# Patient Record
Sex: MALE | Race: WHITE | Employment: FULL TIME | URBAN - METROPOLITAN AREA
[De-identification: names, ages, dates, MRNs, and addresses within clinical notes are randomized per-mention and may not be internally consistent; named-entity substitution may affect disease eponyms.]

---

## 2022-11-16 ENCOUNTER — APPOINTMENT (OUTPATIENT)
Dept: GENERAL RADIOLOGY | Age: 52
DRG: 871 | End: 2022-11-16
Payer: MEDICARE

## 2022-11-16 ENCOUNTER — APPOINTMENT (OUTPATIENT)
Dept: ULTRASOUND IMAGING | Age: 52
DRG: 871 | End: 2022-11-16
Payer: MEDICARE

## 2022-11-16 ENCOUNTER — HOSPITAL ENCOUNTER (INPATIENT)
Age: 52
LOS: 7 days | Discharge: HOME HEALTH | DRG: 871 | End: 2022-11-23
Attending: EMERGENCY MEDICINE | Admitting: INTERNAL MEDICINE
Payer: MEDICARE

## 2022-11-16 DIAGNOSIS — Z71.89 GOALS OF CARE, COUNSELING/DISCUSSION: ICD-10-CM

## 2022-11-16 DIAGNOSIS — K92.2 LOWER GI BLEED: ICD-10-CM

## 2022-11-16 DIAGNOSIS — K94.01 BLEEDING FROM COLOSTOMY STOMA (HCC): Primary | ICD-10-CM

## 2022-11-16 DIAGNOSIS — E86.1 HYPOTENSION DUE TO HYPOVOLEMIA: ICD-10-CM

## 2022-11-16 DIAGNOSIS — D64.9 ANEMIA, UNSPECIFIED TYPE: ICD-10-CM

## 2022-11-16 DIAGNOSIS — R31.9 HEMATURIA, UNSPECIFIED TYPE: ICD-10-CM

## 2022-11-16 DIAGNOSIS — R00.0 TACHYCARDIA: ICD-10-CM

## 2022-11-16 DIAGNOSIS — I95.89 HYPOTENSION DUE TO HYPOVOLEMIA: ICD-10-CM

## 2022-11-16 PROBLEM — K62.5 RECTAL BLEEDING: Status: ACTIVE | Noted: 2022-11-16

## 2022-11-16 LAB
ABO/RH: NORMAL
ANION GAP SERPL CALCULATED.3IONS-SCNC: 19 MMOL/L (ref 3–16)
ANTIBODY SCREEN: NORMAL
BASOPHILS ABSOLUTE: 0 K/UL (ref 0–0.2)
BASOPHILS RELATIVE PERCENT: 0.2 %
BUN BLDV-MCNC: 32 MG/DL (ref 7–20)
CALCIUM SERPL-MCNC: 8.4 MG/DL (ref 8.3–10.6)
CHLORIDE BLD-SCNC: 86 MMOL/L (ref 99–110)
CO2: 19 MMOL/L (ref 21–32)
CREAT SERPL-MCNC: 1.9 MG/DL (ref 0.9–1.3)
EOSINOPHILS ABSOLUTE: 0 K/UL (ref 0–0.6)
EOSINOPHILS RELATIVE PERCENT: 0 %
GFR SERPL CREATININE-BSD FRML MDRD: 41 ML/MIN/{1.73_M2}
GLUCOSE BLD-MCNC: 206 MG/DL (ref 70–99)
GLUCOSE BLD-MCNC: 231 MG/DL (ref 70–99)
GLUCOSE BLD-MCNC: 347 MG/DL (ref 70–99)
HCT VFR BLD CALC: 32.5 % (ref 40.5–52.5)
HEMOGLOBIN: 10.7 G/DL (ref 13.5–17.5)
INR BLD: 1.18 (ref 0.87–1.14)
IRON SATURATION: 22 % (ref 20–50)
IRON: 46 UG/DL (ref 59–158)
LYMPHOCYTES ABSOLUTE: 0.9 K/UL (ref 1–5.1)
LYMPHOCYTES RELATIVE PERCENT: 6.1 %
MAGNESIUM: 1.7 MG/DL (ref 1.8–2.4)
MCH RBC QN AUTO: 29.7 PG (ref 26–34)
MCHC RBC AUTO-ENTMCNC: 33 G/DL (ref 31–36)
MCV RBC AUTO: 90.1 FL (ref 80–100)
MONOCYTES ABSOLUTE: 1.5 K/UL (ref 0–1.3)
MONOCYTES RELATIVE PERCENT: 10 %
NEUTROPHILS ABSOLUTE: 12.4 K/UL (ref 1.7–7.7)
NEUTROPHILS RELATIVE PERCENT: 83.7 %
OCCULT BLOOD SCREENING: ABNORMAL
PDW BLD-RTO: 15.2 % (ref 12.4–15.4)
PERFORMED ON: ABNORMAL
PERFORMED ON: ABNORMAL
PLATELET # BLD: 136 K/UL (ref 135–450)
PMV BLD AUTO: 10.5 FL (ref 5–10.5)
POTASSIUM REFLEX MAGNESIUM: 4.5 MMOL/L (ref 3.5–5.1)
PROTHROMBIN TIME: 14.9 SEC (ref 11.7–14.5)
RBC # BLD: 3.61 M/UL (ref 4.2–5.9)
SODIUM BLD-SCNC: 124 MMOL/L (ref 136–145)
TOTAL IRON BINDING CAPACITY: 213 UG/DL (ref 260–445)
WBC # BLD: 14.8 K/UL (ref 4–11)

## 2022-11-16 PROCEDURE — 94640 AIRWAY INHALATION TREATMENT: CPT

## 2022-11-16 PROCEDURE — 02HV33Z INSERTION OF INFUSION DEVICE INTO SUPERIOR VENA CAVA, PERCUTANEOUS APPROACH: ICD-10-PCS | Performed by: EMERGENCY MEDICINE

## 2022-11-16 PROCEDURE — 82270 OCCULT BLOOD FECES: CPT

## 2022-11-16 PROCEDURE — 2000000000 HC ICU R&B

## 2022-11-16 PROCEDURE — 82728 ASSAY OF FERRITIN: CPT

## 2022-11-16 PROCEDURE — 94761 N-INVAS EAR/PLS OXIMETRY MLT: CPT

## 2022-11-16 PROCEDURE — 83735 ASSAY OF MAGNESIUM: CPT

## 2022-11-16 PROCEDURE — 6370000000 HC RX 637 (ALT 250 FOR IP): Performed by: INTERNAL MEDICINE

## 2022-11-16 PROCEDURE — 86900 BLOOD TYPING SEROLOGIC ABO: CPT

## 2022-11-16 PROCEDURE — 76700 US EXAM ABDOM COMPLETE: CPT

## 2022-11-16 PROCEDURE — 83540 ASSAY OF IRON: CPT

## 2022-11-16 PROCEDURE — 71045 X-RAY EXAM CHEST 1 VIEW: CPT

## 2022-11-16 PROCEDURE — APPSS15 APP SPLIT SHARED TIME 0-15 MINUTES: Performed by: NURSE PRACTITIONER

## 2022-11-16 PROCEDURE — 2580000003 HC RX 258: Performed by: INTERNAL MEDICINE

## 2022-11-16 PROCEDURE — 85018 HEMOGLOBIN: CPT

## 2022-11-16 PROCEDURE — 93005 ELECTROCARDIOGRAM TRACING: CPT | Performed by: EMERGENCY MEDICINE

## 2022-11-16 PROCEDURE — C1769 GUIDE WIRE: HCPCS

## 2022-11-16 PROCEDURE — 86901 BLOOD TYPING SEROLOGIC RH(D): CPT

## 2022-11-16 PROCEDURE — 6360000002 HC RX W HCPCS: Performed by: UROLOGY

## 2022-11-16 PROCEDURE — 6360000002 HC RX W HCPCS

## 2022-11-16 PROCEDURE — 99285 EMERGENCY DEPT VISIT HI MDM: CPT

## 2022-11-16 PROCEDURE — 36430 TRANSFUSION BLD/BLD COMPNT: CPT

## 2022-11-16 PROCEDURE — 36556 INSERT NON-TUNNEL CV CATH: CPT

## 2022-11-16 PROCEDURE — 80048 BASIC METABOLIC PNL TOTAL CA: CPT

## 2022-11-16 PROCEDURE — 85610 PROTHROMBIN TIME: CPT

## 2022-11-16 PROCEDURE — 2580000003 HC RX 258: Performed by: UROLOGY

## 2022-11-16 PROCEDURE — 2580000003 HC RX 258

## 2022-11-16 PROCEDURE — 6370000000 HC RX 637 (ALT 250 FOR IP): Performed by: EMERGENCY MEDICINE

## 2022-11-16 PROCEDURE — 85025 COMPLETE CBC W/AUTO DIFF WBC: CPT

## 2022-11-16 PROCEDURE — 0T7D8ZZ DILATION OF URETHRA, VIA NATURAL OR ARTIFICIAL OPENING ENDOSCOPIC: ICD-10-PCS | Performed by: UROLOGY

## 2022-11-16 PROCEDURE — 86850 RBC ANTIBODY SCREEN: CPT

## 2022-11-16 PROCEDURE — P9016 RBC LEUKOCYTES REDUCED: HCPCS

## 2022-11-16 PROCEDURE — 83550 IRON BINDING TEST: CPT

## 2022-11-16 PROCEDURE — 2709999900 HC NON-CHARGEABLE SUPPLY

## 2022-11-16 PROCEDURE — 86923 COMPATIBILITY TEST ELECTRIC: CPT

## 2022-11-16 PROCEDURE — APPNB15 APP NON BILLABLE TIME 0-15 MINS: Performed by: NURSE PRACTITIONER

## 2022-11-16 PROCEDURE — 85014 HEMATOCRIT: CPT

## 2022-11-16 PROCEDURE — 2700000000 HC OXYGEN THERAPY PER DAY

## 2022-11-16 RX ORDER — SODIUM CHLORIDE 9 MG/ML
INJECTION, SOLUTION INTRAVENOUS PRN
Status: DISCONTINUED | OUTPATIENT
Start: 2022-11-16 | End: 2022-11-23 | Stop reason: HOSPADM

## 2022-11-16 RX ORDER — SODIUM CHLORIDE 0.9 % (FLUSH) 0.9 %
10 SYRINGE (ML) INJECTION PRN
Status: DISCONTINUED | OUTPATIENT
Start: 2022-11-16 | End: 2022-11-23 | Stop reason: HOSPADM

## 2022-11-16 RX ORDER — TADALAFIL 5 MG/1
5 TABLET ORAL PRN
COMMUNITY

## 2022-11-16 RX ORDER — ACETAMINOPHEN 650 MG/1
650 SUPPOSITORY RECTAL EVERY 6 HOURS PRN
Status: DISCONTINUED | OUTPATIENT
Start: 2022-11-16 | End: 2022-11-23 | Stop reason: HOSPADM

## 2022-11-16 RX ORDER — ACETAMINOPHEN 325 MG/1
650 TABLET ORAL EVERY 6 HOURS PRN
Status: DISCONTINUED | OUTPATIENT
Start: 2022-11-16 | End: 2022-11-23 | Stop reason: HOSPADM

## 2022-11-16 RX ORDER — ATORVASTATIN CALCIUM 40 MG/1
40 TABLET, FILM COATED ORAL NIGHTLY
Status: ON HOLD | COMMUNITY
End: 2022-11-22 | Stop reason: HOSPADM

## 2022-11-16 RX ORDER — CELECOXIB 100 MG/1
100 CAPSULE ORAL DAILY
Status: ON HOLD | COMMUNITY
End: 2022-11-22 | Stop reason: HOSPADM

## 2022-11-16 RX ORDER — CLONAZEPAM 1 MG/1
2 TABLET ORAL 2 TIMES DAILY
Status: DISCONTINUED | OUTPATIENT
Start: 2022-11-16 | End: 2022-11-23 | Stop reason: HOSPADM

## 2022-11-16 RX ORDER — PREDNISONE 10 MG/1
TABLET ORAL
Status: ON HOLD | COMMUNITY
Start: 2022-11-14 | End: 2022-11-22 | Stop reason: HOSPADM

## 2022-11-16 RX ORDER — CLONAZEPAM 2 MG/1
2 TABLET ORAL 2 TIMES DAILY
COMMUNITY

## 2022-11-16 RX ORDER — EMTRICITABINE AND TENOFOVIR ALAFENAMIDE 200; 25 MG/1; MG/1
1 TABLET ORAL DAILY
COMMUNITY

## 2022-11-16 RX ORDER — LISINOPRIL 2.5 MG/1
2.5 TABLET ORAL DAILY
Status: ON HOLD | COMMUNITY
End: 2022-11-22 | Stop reason: HOSPADM

## 2022-11-16 RX ORDER — PROMETHAZINE HYDROCHLORIDE 25 MG/1
12.5 TABLET ORAL EVERY 6 HOURS PRN
Status: DISCONTINUED | OUTPATIENT
Start: 2022-11-16 | End: 2022-11-23 | Stop reason: HOSPADM

## 2022-11-16 RX ORDER — ALBUTEROL SULFATE 2.5 MG/3ML
2.5 SOLUTION RESPIRATORY (INHALATION) EVERY 4 HOURS PRN
Status: DISCONTINUED | OUTPATIENT
Start: 2022-11-16 | End: 2022-11-23 | Stop reason: HOSPADM

## 2022-11-16 RX ORDER — SODIUM CHLORIDE 9 MG/ML
INJECTION, SOLUTION INTRAVENOUS CONTINUOUS
Status: DISCONTINUED | OUTPATIENT
Start: 2022-11-16 | End: 2022-11-18

## 2022-11-16 RX ORDER — INSULIN GLARGINE 100 [IU]/ML
18 INJECTION, SOLUTION SUBCUTANEOUS NIGHTLY
Status: ON HOLD | COMMUNITY
End: 2022-11-22 | Stop reason: HOSPADM

## 2022-11-16 RX ORDER — PANTOPRAZOLE SODIUM 40 MG/10ML
40 INJECTION, POWDER, LYOPHILIZED, FOR SOLUTION INTRAVENOUS DAILY
Status: DISCONTINUED | OUTPATIENT
Start: 2022-11-16 | End: 2022-11-23 | Stop reason: HOSPADM

## 2022-11-16 RX ORDER — 0.9 % SODIUM CHLORIDE 0.9 %
500 INTRAVENOUS SOLUTION INTRAVENOUS ONCE
Status: COMPLETED | OUTPATIENT
Start: 2022-11-16 | End: 2022-11-16

## 2022-11-16 RX ORDER — SODIUM CHLORIDE 0.9 % (FLUSH) 0.9 %
10 SYRINGE (ML) INJECTION EVERY 12 HOURS SCHEDULED
Status: DISCONTINUED | OUTPATIENT
Start: 2022-11-16 | End: 2022-11-23 | Stop reason: HOSPADM

## 2022-11-16 RX ORDER — POTASSIUM CHLORIDE 750 MG/1
10 TABLET, EXTENDED RELEASE ORAL DAILY
Status: ON HOLD | COMMUNITY
End: 2022-11-22 | Stop reason: HOSPADM

## 2022-11-16 RX ORDER — DEXTROSE MONOHYDRATE 100 MG/ML
INJECTION, SOLUTION INTRAVENOUS CONTINUOUS PRN
Status: DISCONTINUED | OUTPATIENT
Start: 2022-11-16 | End: 2022-11-23 | Stop reason: HOSPADM

## 2022-11-16 RX ORDER — METFORMIN HYDROCHLORIDE 500 MG/1
1000 TABLET, EXTENDED RELEASE ORAL 2 TIMES DAILY
COMMUNITY

## 2022-11-16 RX ORDER — QUETIAPINE 400 MG/1
800 TABLET, FILM COATED, EXTENDED RELEASE ORAL NIGHTLY
COMMUNITY

## 2022-11-16 RX ORDER — IPRATROPIUM BROMIDE AND ALBUTEROL SULFATE 2.5; .5 MG/3ML; MG/3ML
2 SOLUTION RESPIRATORY (INHALATION) ONCE
Status: COMPLETED | OUTPATIENT
Start: 2022-11-16 | End: 2022-11-16

## 2022-11-16 RX ORDER — INSULIN LISPRO 100 [IU]/ML
0-4 INJECTION, SOLUTION INTRAVENOUS; SUBCUTANEOUS NIGHTLY
Status: DISCONTINUED | OUTPATIENT
Start: 2022-11-16 | End: 2022-11-23 | Stop reason: HOSPADM

## 2022-11-16 RX ORDER — INSULIN LISPRO 100 [IU]/ML
0-8 INJECTION, SOLUTION INTRAVENOUS; SUBCUTANEOUS
Status: DISCONTINUED | OUTPATIENT
Start: 2022-11-16 | End: 2022-11-23 | Stop reason: HOSPADM

## 2022-11-16 RX ORDER — AMOXICILLIN AND CLAVULANATE POTASSIUM 875; 125 MG/1; MG/1
1 TABLET, FILM COATED ORAL 2 TIMES DAILY
Status: ON HOLD | COMMUNITY
Start: 2022-11-14 | End: 2022-11-22 | Stop reason: HOSPADM

## 2022-11-16 RX ORDER — POLYETHYLENE GLYCOL 3350 17 G/17G
17 POWDER, FOR SOLUTION ORAL DAILY PRN
Status: DISCONTINUED | OUTPATIENT
Start: 2022-11-16 | End: 2022-11-23 | Stop reason: HOSPADM

## 2022-11-16 RX ORDER — HYDROCHLOROTHIAZIDE 25 MG/1
25 TABLET ORAL DAILY
Status: ON HOLD | COMMUNITY
End: 2022-11-22 | Stop reason: HOSPADM

## 2022-11-16 RX ORDER — ONDANSETRON 2 MG/ML
4 INJECTION INTRAMUSCULAR; INTRAVENOUS EVERY 6 HOURS PRN
Status: DISCONTINUED | OUTPATIENT
Start: 2022-11-16 | End: 2022-11-23 | Stop reason: HOSPADM

## 2022-11-16 RX ORDER — ONDANSETRON 4 MG/1
4 TABLET, ORALLY DISINTEGRATING ORAL EVERY 8 HOURS PRN
COMMUNITY
Start: 2022-11-14

## 2022-11-16 RX ORDER — ALBUTEROL SULFATE 90 UG/1
2 AEROSOL, METERED RESPIRATORY (INHALATION) EVERY 6 HOURS PRN
COMMUNITY

## 2022-11-16 RX ORDER — ALBUTEROL SULFATE 90 UG/1
2 AEROSOL, METERED RESPIRATORY (INHALATION) EVERY 6 HOURS PRN
Status: DISCONTINUED | OUTPATIENT
Start: 2022-11-16 | End: 2022-11-23 | Stop reason: HOSPADM

## 2022-11-16 RX ADMIN — SODIUM CHLORIDE, PRESERVATIVE FREE 10 ML: 5 INJECTION INTRAVENOUS at 21:00

## 2022-11-16 RX ADMIN — SODIUM CHLORIDE 500 ML: 9 INJECTION, SOLUTION INTRAVENOUS at 18:00

## 2022-11-16 RX ADMIN — CLONAZEPAM 2 MG: 1 TABLET ORAL at 20:54

## 2022-11-16 RX ADMIN — CEFTRIAXONE 2000 MG: 2 INJECTION, POWDER, FOR SOLUTION INTRAMUSCULAR; INTRAVENOUS at 20:58

## 2022-11-16 RX ADMIN — CEFAZOLIN 2000 MG: 2 INJECTION, POWDER, FOR SOLUTION INTRAMUSCULAR; INTRAVENOUS at 19:01

## 2022-11-16 RX ADMIN — IPRATROPIUM BROMIDE AND ALBUTEROL 1 PUFF: 20; 100 SPRAY, METERED RESPIRATORY (INHALATION) at 20:31

## 2022-11-16 RX ADMIN — QUETIAPINE FUMARATE 800 MG: 150 TABLET, EXTENDED RELEASE ORAL at 21:33

## 2022-11-16 RX ADMIN — IPRATROPIUM BROMIDE AND ALBUTEROL SULFATE 2 AMPULE: .5; 2.5 SOLUTION RESPIRATORY (INHALATION) at 14:49

## 2022-11-16 ASSESSMENT — PAIN SCALES - GENERAL: PAINLEVEL_OUTOF10: 0

## 2022-11-16 NOTE — ED PROVIDER NOTES
[facility]      Pt Name: Alejandra Cullen  MRN: [id number]  [place] 1970  Date of evaluation: 11/16/2022  Provider: Gwendolyn Trotter MD    CHIEF COMPLAINT     Bleeding  HISTORY OF PRESENT ILLNESS  (Location/Symptom, Timing/Onset,Context/Setting, Quality, Duration, Modifying Factors, Severity). Note limiting factors. Chief Complaint   Patient presents with    Rectal Bleeding     Pt has been bleeding from colostomy for 2 days, pt is pale and diaphoretic, tachy and hypotensive       Alejandra Cullen is a 47 y.o. male who presents to the emergency department secondary to concern for bleeding from his colostomy bag. He reports has been going on for 2 days. He states it is not stool but rather just bleeding. He states this is happened to him before and it was bleeding at the site that caused to the issue not bleeding from the inside. He reports he has been feeling more short of breath, feeling his heart beat faster, more tired. He states his surgeon was Dr. Evette Spears at [facility].  He reports other than the bleeding he has not had any chest pain, abdominal pain, fevers, chills, cough, nausea, vomiting. He does feel more short of breath when he is laying down flat. He does not live alone and he tells me he did call about the bleeding but was told to see if it got better and come into the ED if it got worse. 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 However he states that because of this he has \"shot veins\". Past medical history noted below. Aside from what is stated above denies any other symptoms or modifying factors. Nursing Notes reviewed. REVIEW OF SYSTEMS  (2-9 systems for level 4, 10 or more for level 5)   Review of Systems Pertinent positive and negative findings as documented in the HPI; otherwise all other systems were reviewed and were negative.    PAST MEDICAL HISTORY     Past Medical History: Diagnosis Date    HIV (human immunodeficiency virus infection) ([facility] [address]        SURGICALHISTORY     No past surgical history on file. CURRENT MEDICATIONS       Current Discharge Medication List        CONTINUE these medications which have NOT CHANGED    Details   albuterol sulfate HFA (PROVENTIL;VENTOLIN;PROAIR) 108 (90 Base) MCG/ACT inhaler Inhale 2 puffs into the lungs every 6 hours as needed for Wheezing or Shortness of Breath      atorvastatin (LIPITOR) 40 MG tablet Take 40 mg by mouth at bedtime      celecoxib (CELEBREX) 100 MG capsule Take 100 mg by mouth daily      dapagliflozin (FARXIGA) 10 MG tablet Take 10 mg by mouth every morning      emtricitabine-tenofovir alafenamide (DESCOVY) 200-25 MG TABS tablet Take 1 tablet by mouth daily      insulin glargine (BASAGLAR KWIKPEN) 100 UNIT/ML injection pen Inject 18 Units into the skin nightly      albuterol-ipratropium (COMBIVENT RESPIMAT)  MCG/ACT AERS inhaler Inhale 1 puff into the lungs in the morning and 1 puff at noon and 1 puff in the evening and 1 puff before bedtime. lisinopril (PRINIVIL;ZESTRIL) 2.5 MG tablet Take 2.5 mg by mouth daily      potassium chloride (KLOR-CON M) 10 MEQ extended release tablet Take 10 mEq by mouth daily      QUEtiapine (SEROQUEL XR) 400 MG extended release tablet Take 800 mg by mouth nightly      raltegravir (ISENTRESS) 400 MG tablet Take 400 mg by mouth 2 times daily      clonazePAM (KLONOPIN) 2 MG tablet Take 2 mg by mouth in the morning and at bedtime.       hydroCHLOROthiazide (HYDRODIURIL) 25 MG tablet Take 25 mg by mouth daily      metFORMIN (GLUCOPHAGE-XR) 500 MG extended release tablet Take 1,000 mg by mouth 2 times daily      tadalafil (CIALIS) 5 MG tablet Take 5 mg by mouth as needed for Erectile Dysfunction      amoxicillin-clavulanate (AUGMENTIN) 875-125 MG per tablet Take 1 tablet by mouth 2 times daily Indications: Upper Respiratory Tract Infection      ondansetron (ZOFRAN-ODT) 4 MG disintegrating tablet Take 4 mg by mouth every 8 hours as needed for Nausea or Vomiting      predniSONE (DELTASONE) 10 MG tablet Take by mouth Take 40mg by mouth daily for 3 days, then take 30mg by mouth daily for 3 days, then take 20mg by mouth daily for 3 days, then take 10mg by mouth daily for 3 days            ALLERGIES     Patient has no known allergies. FAMILY HISTORY     No family history on file. SOCIAL HISTORY       Social History     Socioeconomic History    Marital status: Single     SCREENINGS    [place] Coma Scale  Eye Opening: Spontaneous  Best Verbal Response: Oriented  Best Motor Response: Obeys commands  [place] Coma Scale Score: 15    PHYSICAL EXAM  (up to 7 for level 4, 8 or more for level 5)   INITIAL VITALS: BP: 93/61, Temp: 97.1 °F (36.2 °C), Heart Rate: (!) 150, Resp: (!) 32, SpO2: 93 %   Physical Exam  Vitals reviewed. Constitutional:       General: He is in acute distress. Appearance: He is not diaphoretic. HENT:      Head: Normocephalic and atraumatic. Right Ear: External ear normal.      Left Ear: External ear normal.      Nose: Nose normal.      Mouth/Throat:      Mouth: Mucous membranes are dry. Eyes:      General: No scleral icterus. Right eye: No discharge. Left eye: No discharge. Conjunctiva/sclera: Conjunctivae normal.   Neck:      Trachea: No tracheal deviation. Cardiovascular:      Rate and Rhythm: Tachycardia present. Pulses: Normal pulses. Pulmonary:      Effort: Tachypnea present. No accessory muscle usage or respiratory distress. Breath sounds: Decreased breath sounds and wheezing present. Abdominal:      Tenderness: There is no abdominal tenderness. There is no guarding or rebound. Comments: See images below of stoma site and bleeding in bag    Musculoskeletal:      Cervical back: Normal range of motion. Right lower leg: No edema. Left lower leg: No edema. Skin:     General: Skin is dry.       Capillary Refill: Capillary refill takes 2 to 3 seconds. Coloration: Skin is pale. Neurological:      General: No focal deficit present. Mental Status: He is alert and oriented to person, place, and time. Psychiatric:         Behavior: Behavior normal.               DIAGNOSTIC RESULTS   EKG: interpreted by the Emergency Department Physician who either signs or Co-signs this chart in the absence of a cardiologist.  Indication: Shortness of breath  Interpretation: Rate tachycardic 151, rhythm sinus, axis normal.  KS/QRS/QTc within normal limits. No T/ST changes consistent with acute ischemia. No prior EKG is available for comparison    RADIOLOGY:   Interpretation per Radiologist below, if available at the time of this note:  [address] Banks   Final Result   Marked right hydronephrosis and marked right renal parenchymal atrophy.       RECOMMENDATIONS:   Unavailable           LABS:  Labs Reviewed   BASIC METABOLIC PANEL W/ REFLEX TO MG FOR LOW K - Abnormal; Notable for the following components:       Result Value    Sodium 124 (*)     Chloride 86 (*)     CO2 19 (*)     Anion Gap 19 (*)     Glucose 347 (*)     BUN 32 (*)     Creatinine 1.9 (*)     Est, Glom Filt Rate 41 (*)     All other components within normal limits   CBC WITH AUTO DIFFERENTIAL - Abnormal; Notable for the following components:    WBC 14.8 (*)     RBC 3.61 (*)     Hemoglobin 10.7 (*)     Hematocrit 32.5 (*)     Neutrophils Absolute 12.4 (*)     Lymphocytes Absolute 0.9 (*)     Monocytes Absolute 1.5 (*)     All other components within normal limits   PROTIME-INR - Abnormal; Notable for the following components:    Protime 14.9 (*)     INR 1.18 (*)     All other components within normal limits   BLOOD OCCULT STOOL SCREEN #1 - Abnormal; Notable for the following components:    Occult Blood Screening   (*)     Value: Result: POSITIVE  Normal range: Negative      All other components within normal limits    Narrative:     ORDER#: [id number]                          ORDERED BY: Juarez Goddard  SOURCE: Stool                              COLLECTED:  11/16/22 15:55  ANTIBIOTICS AT [facility].:                      RECEIVED :  11/16/22 16:30   POCT GLUCOSE - Abnormal; Notable for the following components:    POC Glucose 231 (*)     All other components within normal limits   HEMOGLOBIN AND HEMATOCRIT   HEMOGLOBIN AND HEMATOCRIT   AFP TUMOR MARKER   CBC   COMPREHENSIVE METABOLIC PANEL   IRON AND TIBC   FERRITIN   COMPREHENSIVE METABOLIC PANEL W/ REFLEX TO MG FOR LOW K   MAGNESIUM   CBC WITH AUTO DIFFERENTIAL   HEMOGLOBIN A1C   HIV RNA, QUANTITATIVE, PCR   POCT GLUCOSE   TYPE AND SCREEN   PREPARE RBC (CROSSMATCH)       EMERGENCY DEPARTMENT COURSE and DIFFERENTIAL DIAGNOSIS/MDM:   Patient was given the following medications:  Orders Placed This Encounter   Medications    0.9 % sodium chloride infusion    ipratropium-albuterol (DUONEB) nebulizer solution 2 ampule     Order Specific Question:   Initiate RT Bronchodilator Protocol     Answer:   No    sodium chloride flush 0.9 % injection 10 mL    sodium chloride flush 0.9 % injection 10 mL    0.9 % sodium chloride infusion     CONSULTS:  IP CONSULT TO GENERAL SURGERY  IP CONSULT TO GENERAL SURGERY  IP CONSULT TO UROLOGY  IP CONSULT TO GI  IP CONSULT TO NEPHROLOGY  IP CONSULT TO CRITICAL CARE    INITIAL VITALS: BP: 93/61, Temp: 97.1 °F (36.2 °C), Heart Rate: (!) 150, Resp: (!) 32, SpO2: 93 %   RECENT VITALS:  BP: 98/67,Temp: 98.6 °F (37 °C), Heart Rate: (!) 141, Resp: 30, SpO2: 95 %     Is this patient to be included in the SEP-1 Core Measure due to severe sepsis or septic shock? No   Exclusion criteria - the patient is NOT to be included for SEP-1 Core Measure due to: Alternative explanation for abnormal labs/vitals that do not relate to sepsis, see MDM for further explanation    Yony Enriquez is a 47 y.o. male who presents to the emergency department secondary to concern for symptoms as noted in HPI.      On arrival he is awake alert and oriented. He answers questions appropriately. He appears acutely distressed with tachycardia, tachypnea, hypotension noted. He does have a bag full of blood noted in his colostomy bag. He is pale with a delayed cap refill. He has wheezing noted in his lungs and does endorse feeling short of breath. A peripheral IV was placed, labs were ordered along with breathing treatments. He remained hypotensive and given the amount of blood noted in his colostomy bag and review of medical record shows his hemoglobin in October at [facility] was 15.5 and today it is 10.2 there is concern for hypovolemic blood loss leading to his current issues. A central line was placed, see procedure note below. As he denies any recent fevers chills nausea vomiting there is less concern for infection though with the wheezing he was ordered breathing treatments which did help his breathing status. EKG shows sinus tachycardia. Labs revealed a hemoglobin of 10 as noted above. He had a type and screen ordered with 2 units. I consulted surgery at [facility], spoke with Dr. Melissa Kate who relayed messages to Dr. Adina Jernigan and who after reviewing the case with me felt the patient would be better served being admitted here due to his hemodynamic instability. General surgery was consulted along with gastroenterology given likelihood he will need to have scope done. When this was discussed with the patient he let me know that he had the urge to urinate but could not urinate and had been urinating blood. At that time Reece placement was attempted, it was unsuccessful, coudé was then attempted which was also unsuccessful. Consulted urology, Dr. Nubia Ryder came down to the bedside to evaluate the patient, please see his note for details. Discussed admission with the patient, he is in agreement with plan. Discussed goals of care, he is a full code.     Of note, it was noted after he was admitted to the ICU upstairs a chest x-ray had not yet been done to verify line placement. This was then ordered and the ICU staff was contacted, I spoke with the charge nurse who was made aware of the situation. Chest x-ray reviewed by me does verify line placement. CRITICAL CARE TIME   Due to the immediate potential for life-threatening deterioration due to blood loss hemodynamic instability, I spent 70 minutes providing critical care. There was a high probability of clinically significant/life threatening deterioration in the patient's condition which required my urgent intervention. This time excludes time spent performing procedures but includes time spent on direct patient care, history retrieval, review of the chart, and discussions with patient, family, and consultant(s). PROCEDURES:  Central Line    Date/Time: 11/16/2022 2:15 PM  Performed by: Shon Grey MD  Authorized by: Shon Grey MD     Consent:     Consent obtained:  Verbal and emergent situation    Consent given by:  Patient    Risks discussed:  Incorrect placement, arterial puncture, bleeding and infection  Universal protocol:     Immediately prior to procedure, a time out was called: yes      Patient identity confirmed:  Verbally with patient and arm band  Pre-procedure details:     Indication(s): central venous access and insufficient peripheral access      Hand hygiene: Hand hygiene performed prior to insertion      Sterile barrier technique:  All elements of maximal sterile technique followed      Skin preparation:  Chlorhexidine    Skin preparation agent: Skin preparation agent completely dried prior to procedure    Sedation:     Sedation type:  None  Anesthesia:     Anesthesia method:  Local infiltration    Local anesthetic:  Lidocaine 1% w/o epi  Procedure details:     Location:  L internal jugular and R internal jugular (Right IJ was attempted initially however vein on that side very small and given patient's low volume status in addition to his breathing difficulties after 2 attempts site was changed to the left)    Patient position:  Trendelenburg    Procedural supplies:  Triple lumen    Catheter size:  7 Fr    Landmarks identified: yes      Ultrasound guidance: yes      Ultrasound guidance timing: real time      Sterile ultrasound techniques: Sterile gel and sterile probe covers were used      Number of attempts: 2 attempts on the right unsuccessful. 1 attempt on the left success. Successful placement: yes    Post-procedure details:     Post-procedure:  Dressing applied and line sutured    Assessment:  Blood return through all ports and free fluid flow    Procedure completion:  Tolerated well, no immediate complications    FINAL IMPRESSION      1. Bleeding from colostomy stoma ([address]    2. Hypotension due to hypovolemia    3. Tachycardia    4. Hematuria, unspecified type    5.  Goals of care, counseling/discussion        DISPOSITION/PLAN   DISPOSITION Admitted 11/16/2022 03:59:14 PM           (Please note that portions of this note were completed with a voice recognition program. Efforts were made to edit the dictations but occasionally words are mis-transcribed.)    Maynor Razo MD (electronically signed)  Attending Emergency Physician     Maynor Razo MD  11/16/22  Isabel Koo MD  11/16/22 2016

## 2022-11-16 NOTE — ED NOTES
Dr Mahesh Fierro aware, pt Is having bilateral inspiratory and expiratory wheezing      Andi Nicole RN  11/16/22 5402

## 2022-11-16 NOTE — ED NOTES
Dr Mohamud Betancourt at bedside inserting CVC at this time        Dois Demi, [facility]  11/16/22 4753

## 2022-11-16 NOTE — ED NOTES
Dr Willi Nava at bedside performed cystoscopy for harris placement, pt tolerated well      Steffanie Carlson RN  11/16/22 8922

## 2022-11-16 NOTE — CONSULTS
Consulting Physician: Dr. Rio Mercedes    Reason for Consult: hematuria, difficult harris, urinary retention    History of Present Illness: Phil Hart is a 47 y.o. male with h/o colorectal cancer s/p colostomy about 20 years ago who comes in with bleeding from stoma and anemia. Urology was consulted because the patient stated he has also been having hematuria and nursing staff was unable to place harris. He states he has previously been treated for kidney stones but no procedures on his prostate. I attempted harris placement but felt a dense stricture in the penile urethra so I switched to cystoscopy. Please see that dictated note for procedure. Past Medical History:   Past Medical History:   Diagnosis Date    HIV (human immunodeficiency virus infection) ([facility] [address]    hep B, GERD, HLD, HTN, bipolar disorder, substance abuse, pneumonia, avascular necrosis of both hips, rectal cancer anal condyloma    Past Surgical History:  Scotts-rectal resection with colostomy    Social History:  Social History     Socioeconomic History    Marital status: Single     Spouse name: Not on file    Number of children: Not on file    Years of education: Not on file    Highest education level: Not on file   Occupational History    Not on file   Tobacco Use    Smoking status: Not on file    Smokeless tobacco: Not on file   Substance and Sexual Activity    Alcohol use: Not on file    Drug use: Not on file    Sexual activity: Not on file   Other Topics Concern    Not on file   Social History Narrative    Not on file     Social Determinants of Health     Financial Resource Strain: Not on file   Food Insecurity: Not on file   Transportation Needs: Not on file   Physical Activity: Not on file   Stress: Not on file   Social Connections: Not on file   Intimate Partner Violence: Not on file   Housing Stability: Not on file       Family History:  No family history on file.     Meds:   Current Facility-Administered Medications: 0.9 % sodium chloride infusion, , IntraVENous, PRN  ceFAZolin (ANCEF) 2,000 mg in dextrose 5 % 50 mL IVPB (mini-bag), 2,000 mg, IntraVENous, Once    Review of Systems:  [address] were reviewed and negative except as in HPI    Vitals:  BP (!) 88/59   Pulse (!) 136   Temp 97.1 °F (36.2 °C) (Oral)   Resp 15   Wt 243 lb 13.3 oz (110.6 kg)   SpO2 99%   No intake or output data in the 24 hours ending 11/16/22 1644    Physical Exam:  General Appearance: Alert and oriented, cooperative, no distress, appears stated age  Head: Normocephalic, without obvious abnormality, atraumatic  Back: no CVA tenderness  Lungs: respirations unlabored, no wheezing  Heart: Regular rate and rhythm, no lower extremity edema noted  Abdomen: Soft, non-tender, non-distended, no masses  Skin: Skin color, texture, turgor normal, no rashes or lesions  Neurologic: no gross deficits   Male :  Nonpalpable bladder  No CVA tenderness    Penis appears normal and  circumcised  Urethral meatus is normal in size and location  Scrotum appears normal   KAMILLA Not indicated    Labs:  CBC   Lab Results   Component Value Date/Time    WBC 14.8 11/16/2022 01:16 PM    RBC 3.61 11/16/2022 01:16 PM    HGB 10.7 11/16/2022 01:16 PM    HCT 32.5 11/16/2022 01:16 PM    MCV 90.1 11/16/2022 01:16 PM    MCH 29.7 11/16/2022 01:16 PM    MCHC 33.0 11/16/2022 01:16 PM    RDW 15.2 11/16/2022 01:16 PM     11/16/2022 01:16 PM    MPV 10.5 11/16/2022 01:16 PM     BMP   Lab Results   Component Value Date/Time     11/16/2022 01:15 PM    K 4.5 11/16/2022 01:15 PM    CL 86 11/16/2022 01:15 PM    CO2 19 11/16/2022 01:15 PM    BUN 32 11/16/2022 01:15 PM    CREATININE 1.9 11/16/2022 01:15 PM    GLUCOSE 347 11/16/2022 01:15 PM    CALCIUM 8.4 11/16/2022 01:15 PM       Urinalysis: No results found for: COLORU, GLUCOSEU, BLOODU, NITRU, LEUKOCYTESUR    Imaging:  NA     Impression/Plan: 46 yo male with urethral stricture and difficult harris placement, minimal hematuria  - Harris placed via cystoscopy  - Keep harris in place for at least 5 days and until medically stable  - Recommend follow up with primary urologist and formal dilation of urethral stricture in near future    Russell Shepherd MD [id number] PM

## 2022-11-16 NOTE — CONSULTS
General Surgery Consult     Jessica Quintero   : 1970 MRN: [id number]  Date of Admission: 2022  Admitting Physician:Kaelyn Hawkins MD  Primary Care Physician: No primary care provider on file. Consulting Physician: Dr. John Hobson    Reason for Consult: bleeding stoma    Chief complaint:  bleeding from stoma    Diagnosis Present on Admission:   Rectal bleeding      History of Present Illness  Jessica Quintero is a 47 y.o. male PMH HIV, hep B, GERD, HLD, HTN, bipolar disorder, substance abuse, pneumonia, avascular necrosis of both hips, rectal cancer anal condyloma hx admitted on 2022 for bleeding from colostomy. e is in moderate distress laying on ED stretcher frustrated by being NPO. Tachycardic to 140s and hypotensive to SBP 80s. Unable to urinate and urology actively attempting catheterization. He tried to get squad to take him to [facility] or  where his providers are but pt states ostomy bag \"was about to blow off and was full of blood so they brought me here. I never go to [facility]. \" 2 day history of bleeding either from stomal tissue or out of os, he is not sure. In the past he has had issues with appliance irritating the stoma causing bleeding. Appliance he came into ED with was full of coagulated bright red blood. After two hours, the appliance I removed had about 20 ml dark red blood in it. After watching it for about 15 minutes during exam, he had no further bleeding. Small amount dark stool possibly melena so checked for hemoccult. 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 Radha Ruff. 2001, S/p (2001) Exam under anesthesia, incision and drainage of ischiorectal abscess, s/p (3/2/2001) Proctoscopy with biopsy, wide incision and drainage of ischiorectal buttock and perirectal abscesses, destruction of condyloma. Hx of kidney stones requiring stent placement.    US abd 2021 diffuse hepatocellular disease . Labs remarkable for  INR 1.18, WBC j14.8, Hg 10. 7, Cr 1.9, glucose 347. Review of Systems  CONSTITUTIONAL: No weight loss, fever, chills. +weakness or fatigue x 2 days. HEENT: Eyes: No diplopia or blurred vision. ENT: No earache, sore throat or runny nose. CARDIOVASCULAR: No pressure, squeezing, strangling, tightness, heaviness or aching about the chest, neck, axilla or epigastrium. RESPIRATORY: No cough, shortness of breath, PND or orthopnea. GASTROINTESTINAL: + bleeding from stoma x 2 days. GENITOURINARY: No dysuria, frequency or urgency. MUSCULOSKELETAL: No muscle, back pain, joint pain or stiffness  SKIN: No change in skin, hair or nails. NEUROLOGIC: No paresthesias, fasciculations, seizures or weakness. PSYCHIATRIC: No disorder of thought or mood. ENDOCRINE: No heat or cold intolerance, polyuria or polydipsia. HEMATOLOGICAL: No easy bruising or bleeding. Past Medical History:   Diagnosis Date    HIV (human immunodeficiency virus infection) ([facility] [address]      No past surgical history on file.   Family history reviewed, noncontributory to current condition or decision making  Social History     Socioeconomic History    Marital status: Single     Spouse name: Not on file    Number of children: Not on file    Years of education: Not on file    Highest education level: Not on file   Occupational History    Not on file   Tobacco Use    Smoking status: Not on file    Smokeless tobacco: Not on file   Substance and Sexual Activity    Alcohol use: Not on file    Drug use: Not on file    Sexual activity: Not on file   Other Topics Concern    Not on file   Social History Narrative    Not on file     Social Determinants of Health     Financial Resource Strain: Not on file   Food Insecurity: Not on file   Transportation Needs: Not on file   Physical Activity: Not on file   Stress: Not on file   Social Connections: Not on file   Intimate Partner Violence: Not on file   Housing Stability: Not on file     Not on File  Prior to Admission medications    Not on File           Physical Exam  Vitals:    11/16/22 1515 11/16/22 1530 11/16/22 1540 11/16/22 1545   BP: (!) 99/39 (!) 69/57 (!) 78/65 (!) 84/54   Pulse: (!) 145 (!) 140 (!) 142 (!) 142   Resp: 28 25 26 22   Temp:       TempSrc:       SpO2: 97% 99% 98% 98%   Weight:           No intake or output data in the 24 hours ending 11/16/22 1603    There is no height or weight on file to calculate BMI. General Appearance: in mild to moderate distress and ill-appearing   HEENT: NCAT, PERRLA, Conjunctiva non injected, no scleral icterus. External ears and nares normal bilaterally. Mucous membranes pink and moist.   Neck: Neck is symmetrical. Trachea appears midline. Lung: Clear to auscultation bilaterally, normal rate and effort   Cardiac: Regular rate and rhythm, S1S2 present, without murmur   Abdomen: Soft, mild distention. Scars c/w surgical history. LLQ ostomy with 20 ml dark blood in appliance. No active bleeding from os or parastomal tissue. Ostomy mildly edematous. Digitalized, no blood on glove. No stricture. Small amount dark stool out mid-assessment. Neuro: No gross motor or sensory deficits. Skin: No open wounds or rashes. MSK: normal ROM, no edema  Psych: normal insight, judgment    Labs  Recent Labs     11/16/22  1316   WBC 14.8*   HGB 10.7*   HCT 32.5*      INR 1.18*     Recent Labs     11/16/22  1315   *   K 4.5   CL 86*   CO2 19*   BUN 32*     No results for input(s): TP, ALB, GLOB, AST, ALT in the last 72 hours. Invalid input(s): DBIL, TBIL, AGRAT, GPT, ODALYS, LIP  No results for input(s): UOSM in the last 72 hours. Invalid input(s): Devan Garcia, Ashley, [place], [place], [facility]    Imaging  No orders to display            Assessment  Ghassan Bartlett is a 47 y.o. male admitted for bleeding from colostomy    Plan    1. Bleeding from colostomy  Unsure if coming from os-GI bleed- or stomal irritation.  No active bleeding during assessment. New 1 piece pouch applied, monitor. GI to eval, may need scope via colostomy if continues to bleed and not coming from a visible source  Correct coagulopathies and transfuse PRN  Further recommendations per Dr. Rossana Gomez  2. Hepatitis, cirrhosis  Bilirubin 1.18 (2 a month ago) INR 1.18      Electronically signed by JULIA Vo CNP on 11/16/22 at 4:03 PM EST     Attending    As per note above by Aleta Kanner  Patient was personally seen and examined by me today  Chart, labs and imaging reviewed    A/P  Bleeding from stoma   This has been an issue before per chart review   Likely from irritation from his stoma appliance.  There is an annular ring in the mid stoma from a tight stoma   Will monitor to assure he does not have other GI bleeding   Local measures as need if bleeding persists    Electronically signed by Ramon Emery MD on 11/16/2022 at 4:54 PM

## 2022-11-16 NOTE — CONSULTS
Gastroenterology Consult Note      Patient: Eleni Hernandez  : 1970  Acct#:      Date:  2022    Subjective:       History of Present Illness  Patient is a 47 y.o.  male who is seen in consult for GI blood loss anemia. The patient had a wide perineal resection and APR with diverting colostomy due to a HPV related squamous cell anal cancer, diabetes, chronic hepatitis B, HIV, fatty liver disease with cirrhosis followed by Dr. Shanel House at [facility]. His HIV is well controlled on antiretroviral therapy. He is on tenofovir for his hepatitis B. He has followed with infectious disease and has had an undetectable viral load and a good CD4 count. The patient has had a diverting colostomy since . He has been followed by Dr. Lissa Ho. The has reported previous history of peristomal bleeding. He reports that his peristomal appliance irritates his stoma. He reported that over the past 48 hours he had seen significant blood into his ostomy bag. He had removed the ostomy bag and reported seeing pulsatile maroon blood. He denied any melena. He has not had any hematemesis. He tried to deal with this at home became increasingly weak and eventually presented to the ER for further evaluation. He was found to be hypotensive and tachycardic. He does take Celebrex daily. He reportedly had been on steroids. He is not on any aspirin or any antithrombotic agents. Past Medical History:   Diagnosis Date    HIV (human immunodeficiency virus infection) ([facility] [address]       No past surgical history on file. Past Endoscopic History: None seen in Care Everywhere. Admission Meds  No current facility-administered medications on file prior to encounter.      Current Outpatient Medications on File Prior to Encounter   Medication Sig Dispense Refill    albuterol sulfate HFA (PROVENTIL;VENTOLIN;PROAIR) 108 (90 Base) MCG/ACT inhaler Inhale 2 puffs into the lungs every 6 hours as needed for Wheezing or Shortness of Breath      atorvastatin (LIPITOR) 40 MG tablet Take 40 mg by mouth at bedtime      celecoxib (CELEBREX) 100 MG capsule Take 100 mg by mouth daily      dapagliflozin (FARXIGA) 10 MG tablet Take 10 mg by mouth every morning      emtricitabine-tenofovir alafenamide (DESCOVY) 200-25 MG TABS tablet Take 1 tablet by mouth daily      insulin glargine (BASAGLAR KWIKPEN) 100 UNIT/ML injection pen Inject 18 Units into the skin nightly      albuterol-ipratropium (COMBIVENT RESPIMAT)  MCG/ACT AERS inhaler Inhale 1 puff into the lungs in the morning and 1 puff at noon and 1 puff in the evening and 1 puff before bedtime. lisinopril (PRINIVIL;ZESTRIL) 2.5 MG tablet Take 2.5 mg by mouth daily      potassium chloride (KLOR-CON M) 10 MEQ extended release tablet Take 10 mEq by mouth daily      QUEtiapine (SEROQUEL XR) 400 MG extended release tablet Take 800 mg by mouth nightly      raltegravir (ISENTRESS) 400 MG tablet Take 400 mg by mouth 2 times daily      clonazePAM (KLONOPIN) 2 MG tablet Take 2 mg by mouth in the morning and at bedtime.       hydroCHLOROthiazide (HYDRODIURIL) 25 MG tablet Take 25 mg by mouth daily      metFORMIN (GLUCOPHAGE-XR) 500 MG extended release tablet Take 1,000 mg by mouth 2 times daily      tadalafil (CIALIS) 5 MG tablet Take 5 mg by mouth as needed for Erectile Dysfunction      amoxicillin-clavulanate (AUGMENTIN) 875-125 MG per tablet Take 1 tablet by mouth 2 times daily Indications: Upper Respiratory Tract Infection      ondansetron (ZOFRAN-ODT) 4 MG disintegrating tablet Take 4 mg by mouth every 8 hours as needed for Nausea or Vomiting      predniSONE (DELTASONE) 10 MG tablet Take by mouth Take 40mg by mouth daily for 3 days, then take 30mg by mouth daily for 3 days, then take 20mg by mouth daily for 3 days, then take 10mg by mouth daily for 3 days           Allergies  Not on File     Social history:  Social History     Tobacco Use    Smoking status: Not on file    Smokeless tobacco: Not on file   Substance Use Topics    Alcohol use: Not on file        Family History:   No family history on file. Review of Systems  Pertinent items are noted in HPI. Physical Exam:  Blood pressure (!) 84/54, pulse (!) 142, temperature 97.1 °F (36.2 °C), temperature source Oral, resp. rate 22, weight 243 lb 13.3 oz (110.6 kg), SpO2 98 %. General appearance: alert, cooperative, pallor noted, appears stated age  Eyes: Anicteric, pallor  Head: Normocephalic, without obvious abnormality  Lungs: clear to auscultation bilaterally, Normal Effort  Heart: Tachyegular rate and rhythm, normal S1 and S2, no murmurs or rubs  Abdomen: soft, obese, non-tender. Stoma in LLQ with brown liquid stool noted. No marroon or bright red blood noted. Stoma examined and no bleeding source noted . Bowel sounds normal. No masses,  no organomegaly. Extremities: atraumatic, no cyanosis or edema  Skin: cool and dry, no jaundice, Pallor  Neuro: Grossly intact, A&OX3      Data Review:    Recent Labs     11/16/22  1316   WBC 14.8*   HGB 10.7*   HCT 32.5*   MCV 90.1        Recent Labs     11/16/22  1315   *   K 4.5   CL 86*   CO2 19*   BUN 32*   CREATININE 1.9*     No results for input(s): AST, ALT, ALB, BILIDIR, BILITOT, ALKPHOS in the last 72 hours. No results for input(s): LIPASE, AMYLASE in the last 72 hours. Recent Labs     11/16/22  1316   PROTIME 14.9*   INR 1.18*     No results for input(s): PTT in the last 72 hours. No results for input(s): OCCULTBLD in the last 72 hours. Imaging Studies:    No orders to display                                              Assessment:     1) Acute blood loss anemia with hemorrhagic shock-suspect that the patient likely had bleeding from a parastomal source. Possible peristomal varices versus peristomal ulcer.   This was not visible on exam.  The patient does have underlying cirrhosis and certainly luminal GI blood loss is a consideration, but his lack of melena or evidence of luminal GI blood loss on exam would argue against this. 2) Cirrhosis-  Based on prior imaging patient has suspected portal HTN and splenomegaly. No obvious ascites on exam.  Patient with chronic HBV on Tenofovir. He reports diagnosis of MAGALLON. No recent EGD. No prior history of variceal hemorrhage, encephalopathy, ascites, or SBP. 3) HIV/Hep B coinfection    4) DM    5) Leukocytosis- suspect hemoconcentration > sepsis    6) Hx of HTN- meds on hold    7) Hyperlipidemia  8) NAVID  9) Hx of anal cancer s/p APR        Recommendations:   1)IV fluid bolus  2) Transfuse 2 units of PRBC's for symptomatic anemia and shock  3) H and H q 6 hours  4) Agree with surgical evaluation  5) Clear liquids for now  6) May need evaluation with ileoscopy and EGD tomorrow. 7) PPI bid. 8) US abdomen, AFP  9) Start Rocephin emperically for bleeding SBP prophylaxis until ascites ruled out  10) Hold off on octreotide gtt for now. If re-bleeds, could initiate it.    11) Clears for now. Thank you for allowing me to participate in the care of your patient. Please feel free to contact me with any questions or concerns.      Sarai Bertrand, DO  [address]

## 2022-11-16 NOTE — ED NOTES
Pt arrived to the ED via EMS, pt states that he has been bleeding from his colostomy bag for \" a few days\" pt is alert and orient upon arrival , pt is tachycardic and hypotensive upon arrival Dr Allison Wilson called to bedside, pt's colostomy bag removed and  copious amount of blood with large clots in bag, pt is pale and c/o difficulty breathing pt was placed on 3 liters nasal cannula for oxygen of 89%  on room air       Bel Khanna RN  11/1970

## 2022-11-16 NOTE — H&P
Hospital Medicine History & Physical      PCP: No primary care provider on file. Date of Admission: 11/16/2022    Chief Complaint:  bleeding from colostomy    History Of Present Illness:      Patient is a 23-year-old male with past medical history of HIV, hepatitis B cirrhosis hypertension who presents to the hospital for bleeding from colostomy. According to patient he has been bleeding for past 48 hours. He mentions he feels dizzy lightheaded he mentions his bleeding has been slowing down in the past 48 hours. Denied fevers chills diarrhea. Past Medical History:          Diagnosis Date    HIV (human immunodeficiency virus infection) ([facility] [address]        Past Surgical History:      No past surgical history on file. Medications Prior to Admission:      Prior to Admission medications    Medication Sig Start Date End Date Taking? Authorizing Provider   albuterol sulfate HFA (PROVENTIL;VENTOLIN;PROAIR) 108 (90 Base) MCG/ACT inhaler Inhale 2 puffs into the lungs every 6 hours as needed for Wheezing or Shortness of Breath   Yes Historical Provider, MD   atorvastatin (LIPITOR) 40 MG tablet Take 40 mg by mouth at bedtime   Yes Historical Provider, MD   celecoxib (CELEBREX) 100 MG capsule Take 100 mg by mouth daily   Yes Historical Provider, MD   dapagliflozin (FARXIGA) 10 MG tablet Take 10 mg by mouth every morning   Yes Historical Provider, MD   emtricitabine-tenofovir alafenamide (DESCOVY) 200-25 MG TABS tablet Take 1 tablet by mouth daily   Yes Historical Provider, MD   insulin glargine (BASAGLAR KWIKPEN) 100 UNIT/ML injection pen Inject 18 Units into the skin nightly   Yes Historical Provider, MD   albuterol-ipratropium (COMBIVENT RESPIMAT)  MCG/ACT AERS inhaler Inhale 1 puff into the lungs in the morning and 1 puff at noon and 1 puff in the evening and 1 puff before bedtime.    Yes Historical Provider, MD   lisinopril (PRINIVIL;ZESTRIL) 2.5 MG tablet Take 2.5 mg by mouth daily   Yes Historical Provider, MD   potassium chloride (KLOR-CON M) 10 MEQ extended release tablet Take 10 mEq by mouth daily   Yes Historical Provider, MD   QUEtiapine (SEROQUEL XR) 400 MG extended release tablet Take 800 mg by mouth nightly   Yes Historical Provider, MD   raltegravir (ISENTRESS) 400 MG tablet Take 400 mg by mouth 2 times daily   Yes Historical Provider, MD   clonazePAM (KLONOPIN) 2 MG tablet Take 2 mg by mouth in the morning and at bedtime. Yes Historical Provider, MD   hydroCHLOROthiazide (HYDRODIURIL) 25 MG tablet Take 25 mg by mouth daily   Yes Historical Provider, MD   metFORMIN (GLUCOPHAGE-XR) 500 MG extended release tablet Take 1,000 mg by mouth 2 times daily   Yes Historical Provider, MD   tadalafil (CIALIS) 5 MG tablet Take 5 mg by mouth as needed for Erectile Dysfunction   Yes Historical Provider, MD   amoxicillin-clavulanate (AUGMENTIN) 875-125 MG per tablet Take 1 tablet by mouth 2 times daily Indications: Upper Respiratory Tract Infection 11/14/22 11/23/22 Yes Historical Provider, MD   ondansetron (ZOFRAN-ODT) 4 MG disintegrating tablet Take 4 mg by mouth every 8 hours as needed for Nausea or Vomiting 11/14/22  Yes Historical Provider, MD   predniSONE (DELTASONE) 10 MG tablet Take by mouth Take 40mg by mouth daily for 3 days, then take 30mg by mouth daily for 3 days, then take 20mg by mouth daily for 3 days, then take 10mg by mouth daily for 3 days 11/14/22 11/25/22 Yes Historical Provider, MD       Allergies:  Patient has no known allergies. Social History:      TOBACCO:   has no history on file for tobacco use. ETOH:   has no history on file for alcohol use. Family History:       Reviewed in detail and non contributory      No family history on file. REVIEW OF SYSTEMS:   Pertinent positives as noted in the HPI. All other systems reviewed and negative.     PHYSICAL EXAM PERFORMED:    BP 98/67   Pulse (!) 141   Temp 98.6 °F (37 °C) (Oral)   Resp 30   Ht 6' 1\" (1.854 m)   Wt 250 lb 7.1 oz (113.6 kg)   SpO2 95%   BMI 33.04 kg/m²     General appearance:  No apparent distress, cooperative. HEENT:  Normal cephalic, atraumatic without obvious deformity. Conjunctivae/corneas clear. Neck: Supple, with full range of motion. No cervical lymphadenopathy  Respiratory:  Normal respiratory effort. Clear to auscultation, bilaterally without Rales/Wheezes/Rhonchi. Cardiovascular:  Regular rate and rhythm with normal S1/S2 without murmurs, rubs or gallops. Abdomen: Soft, non-tender, distended, normal bowel sounds. Musculoskeletal:  No edema noted bilaterally. No tenderness on palpation   Skin: no rash visible  Neurologic:  Neurologically intact without any focal sensory/motor deficits. grossly non-focal.  Psychiatric:  Alert and oriented, normal mood  Peripheral Pulses: +2 palpable, equal bilaterally       Labs:     Recent Labs     11/16/22  1316   WBC 14.8*   HGB 10.7*   HCT 32.5*        Recent Labs     11/16/22  1315   *   K 4.5   CL 86*   CO2 19*   BUN 32*   CREATININE 1.9*   CALCIUM 8.4     No results for input(s): AST, ALT, BILIDIR, BILITOT, ALKPHOS in the last 72 hours. Recent Labs     11/16/22  1316   INR 1.18*     No results for input(s): Marvie Forget in the last 72 hours. Urinalysis:    No results found for: [place], BACTERIA, RBCUA, BLOODU, [address], [address]    Radiology:       US ABDOMEN COMPLETE   Final Result   Marked right hydronephrosis and marked right renal parenchymal atrophy. RECOMMENDATIONS:   Unavailable                 Active Hospital Problems    Diagnosis Date Noted    Rectal bleeding [K62.5] 11/16/2022     Priority: Medium       Patient is a 58-year-old male with past medical history of HIV, hepatitis B cirrhosis hypertension who presents to the hospital for bleeding from colostomy. According to patient he has been bleeding for past 48 hours. He mentions he feels dizzy lightheaded he mentions his bleeding has been slowing down in the past 48 hours. Denied fevers chills diarrhea. Assessment  Bleeding from colostomy  Blood loss anemia  Hemorrhagic shock  Hyponatremia  Acute kidney injury  Urinary retention  Cirrhosis  HIV infection  Upper respiratory infection  Diabetes mellitus  History of anal cancer status post colostomy    Plan  IV fluid therapy  Monitor H&H  Order Rocephin  Admit to ICU, low threshold for restarting vasopressor support  Start IV Protonix  General surgery, GI consulted from ED  Urology consulted for urinary retention  Ultrasound abdomen, AFP  DVT prophylaxis-SCDs  Diet: ADULT DIET; Clear Liquid  Code Status: No Order    PT/OT Eval Status: ordered    Dispo - pending clinical improvement       Hortensia Blackmon MD    The note was completed using EMR and Dragon dictation system. Every effort was made to ensure accuracy; however, inadvertent computerized transcription errors may be present. Thank you No primary care provider on file. for the opportunity to be involved in this patient's care. If you have any questions or concerns please feel free to contact me at [phone].     Hortensia Blackmon MD

## 2022-11-17 ENCOUNTER — ANESTHESIA (OUTPATIENT)
Dept: ENDOSCOPY | Age: 52
DRG: 871 | End: 2022-11-17
Payer: MEDICARE

## 2022-11-17 ENCOUNTER — APPOINTMENT (OUTPATIENT)
Dept: CT IMAGING | Age: 52
DRG: 871 | End: 2022-11-17
Payer: MEDICARE

## 2022-11-17 ENCOUNTER — ANESTHESIA EVENT (OUTPATIENT)
Dept: ENDOSCOPY | Age: 52
DRG: 871 | End: 2022-11-17
Payer: MEDICARE

## 2022-11-17 PROBLEM — K94.01 BLEEDING FROM COLOSTOMY STOMA (HCC): Status: ACTIVE | Noted: 2022-11-17

## 2022-11-17 LAB
A/G RATIO: 0.9 (ref 1.1–2.2)
ALBUMIN SERPL-MCNC: 2.4 G/DL (ref 3.4–5)
ALP BLD-CCNC: 62 U/L (ref 40–129)
ALT SERPL-CCNC: 1223 U/L (ref 10–40)
ANION GAP SERPL CALCULATED.3IONS-SCNC: 13 MMOL/L (ref 3–16)
ANISOCYTOSIS: ABNORMAL
AST SERPL-CCNC: 2172 U/L (ref 15–37)
BASE EXCESS ARTERIAL: -3.2 MMOL/L (ref -3–3)
BASOPHILS ABSOLUTE: 0 K/UL (ref 0–0.2)
BASOPHILS RELATIVE PERCENT: 0.2 %
BILIRUB SERPL-MCNC: 1.3 MG/DL (ref 0–1)
BLOOD BANK DISPENSE STATUS: NORMAL
BLOOD BANK DISPENSE STATUS: NORMAL
BLOOD BANK PRODUCT CODE: NORMAL
BLOOD BANK PRODUCT CODE: NORMAL
BPU ID: NORMAL
BPU ID: NORMAL
BUN BLDV-MCNC: 36 MG/DL (ref 7–20)
CALCIUM SERPL-MCNC: 7.2 MG/DL (ref 8.3–10.6)
CARBOXYHEMOGLOBIN ARTERIAL: 0.8 % (ref 0–1.5)
CHLORIDE BLD-SCNC: 94 MMOL/L (ref 99–110)
CO2: 20 MMOL/L (ref 21–32)
CREAT SERPL-MCNC: 1.7 MG/DL (ref 0.9–1.3)
DESCRIPTION BLOOD BANK: NORMAL
DESCRIPTION BLOOD BANK: NORMAL
EKG ATRIAL RATE: 151 BPM
EKG DIAGNOSIS: NORMAL
EKG P AXIS: 89 DEGREES
EKG P-R INTERVAL: 118 MS
EKG Q-T INTERVAL: 266 MS
EKG QRS DURATION: 72 MS
EKG QTC CALCULATION (BAZETT): 421 MS
EKG R AXIS: 88 DEGREES
EKG T AXIS: 85 DEGREES
EKG VENTRICULAR RATE: 151 BPM
EOSINOPHILS ABSOLUTE: 0 K/UL (ref 0–0.6)
EOSINOPHILS RELATIVE PERCENT: 0.1 %
FERRITIN: 5897 NG/ML (ref 30–400)
GFR SERPL CREATININE-BSD FRML MDRD: 47 ML/MIN/{1.73_M2}
GLUCOSE BLD-MCNC: 202 MG/DL (ref 70–99)
GLUCOSE BLD-MCNC: 213 MG/DL (ref 70–99)
GLUCOSE BLD-MCNC: 226 MG/DL (ref 70–99)
GLUCOSE BLD-MCNC: 226 MG/DL (ref 70–99)
GLUCOSE BLD-MCNC: 244 MG/DL (ref 70–99)
HCO3 ARTERIAL: 20.6 MMOL/L (ref 21–29)
HCT VFR BLD CALC: 25.8 % (ref 40.5–52.5)
HCT VFR BLD CALC: 25.8 % (ref 40.5–52.5)
HCT VFR BLD CALC: 27.3 % (ref 40.5–52.5)
HCT VFR BLD CALC: 28.4 % (ref 40.5–52.5)
HCT VFR BLD CALC: 28.7 % (ref 40.5–52.5)
HCT VFR BLD CALC: 28.8 % (ref 40.5–52.5)
HEMOGLOBIN, ART, EXTENDED: 9.9 G/DL (ref 13.5–17.5)
HEMOGLOBIN: 8.5 G/DL (ref 13.5–17.5)
HEMOGLOBIN: 8.7 G/DL (ref 13.5–17.5)
HEMOGLOBIN: 9.3 G/DL (ref 13.5–17.5)
HEMOGLOBIN: 9.5 G/DL (ref 13.5–17.5)
HEMOGLOBIN: 9.8 G/DL (ref 13.5–17.5)
HEMOGLOBIN: 9.9 G/DL (ref 13.5–17.5)
LACTIC ACID, SEPSIS: 1.3 MMOL/L (ref 0.4–1.9)
LACTIC ACID: 1.7 MMOL/L (ref 0.4–2)
LYMPHOCYTES ABSOLUTE: 0.9 K/UL (ref 1–5.1)
LYMPHOCYTES RELATIVE PERCENT: 9.6 %
MCH RBC QN AUTO: 31.2 PG (ref 26–34)
MCHC RBC AUTO-ENTMCNC: 34.5 G/DL (ref 31–36)
MCV RBC AUTO: 90.5 FL (ref 80–100)
METHEMOGLOBIN ARTERIAL: 0.1 %
MONOCYTES ABSOLUTE: 0.8 K/UL (ref 0–1.3)
MONOCYTES RELATIVE PERCENT: 8.3 %
NEUTROPHILS ABSOLUTE: 7.9 K/UL (ref 1.7–7.7)
NEUTROPHILS RELATIVE PERCENT: 81.8 %
O2 SAT, ARTERIAL: 96.8 %
O2 THERAPY: ABNORMAL
PCO2 ARTERIAL: 31.9 MMHG (ref 35–45)
PDW BLD-RTO: 15.3 % (ref 12.4–15.4)
PERFORMED ON: ABNORMAL
PH ARTERIAL: 7.42 (ref 7.35–7.45)
PLATELET # BLD: 70 K/UL (ref 135–450)
PLATELET SLIDE REVIEW: ABNORMAL
PMV BLD AUTO: 10.7 FL (ref 5–10.5)
PO2 ARTERIAL: 80.1 MMHG (ref 75–108)
POTASSIUM REFLEX MAGNESIUM: 3.9 MMOL/L (ref 3.5–5.1)
POTASSIUM SERPL-SCNC: 3.9 MMOL/L (ref 3.5–5.1)
PROCALCITONIN: 5.05 NG/ML (ref 0–0.15)
RAPID INFLUENZA  B AGN: NEGATIVE
RAPID INFLUENZA A AGN: NEGATIVE
RBC # BLD: 3.19 M/UL (ref 4.2–5.9)
SARS-COV-2, NAAT: NOT DETECTED
SLIDE REVIEW: ABNORMAL
SODIUM BLD-SCNC: 127 MMOL/L (ref 136–145)
TCO2 ARTERIAL: 21.6 MMOL/L
TOTAL PROTEIN: 5 G/DL (ref 6.4–8.2)
WBC # BLD: 9.7 K/UL (ref 4–11)

## 2022-11-17 PROCEDURE — 85018 HEMOGLOBIN: CPT

## 2022-11-17 PROCEDURE — 6360000002 HC RX W HCPCS: Performed by: INTERNAL MEDICINE

## 2022-11-17 PROCEDURE — 6370000000 HC RX 637 (ALT 250 FOR IP): Performed by: INTERNAL MEDICINE

## 2022-11-17 PROCEDURE — 6370000000 HC RX 637 (ALT 250 FOR IP): Performed by: STUDENT IN AN ORGANIZED HEALTH CARE EDUCATION/TRAINING PROGRAM

## 2022-11-17 PROCEDURE — 2700000000 HC OXYGEN THERAPY PER DAY

## 2022-11-17 PROCEDURE — 82803 BLOOD GASES ANY COMBINATION: CPT

## 2022-11-17 PROCEDURE — 93010 ELECTROCARDIOGRAM REPORT: CPT | Performed by: INTERNAL MEDICINE

## 2022-11-17 PROCEDURE — 2580000003 HC RX 258: Performed by: NURSE ANESTHETIST, CERTIFIED REGISTERED

## 2022-11-17 PROCEDURE — 2580000003 HC RX 258

## 2022-11-17 PROCEDURE — 88305 TISSUE EXAM BY PATHOLOGIST: CPT

## 2022-11-17 PROCEDURE — 94640 AIRWAY INHALATION TREATMENT: CPT

## 2022-11-17 PROCEDURE — 83605 ASSAY OF LACTIC ACID: CPT

## 2022-11-17 PROCEDURE — 80053 COMPREHEN METABOLIC PANEL: CPT

## 2022-11-17 PROCEDURE — C9113 INJ PANTOPRAZOLE SODIUM, VIA: HCPCS | Performed by: INTERNAL MEDICINE

## 2022-11-17 PROCEDURE — 2000000000 HC ICU R&B

## 2022-11-17 PROCEDURE — 6360000002 HC RX W HCPCS

## 2022-11-17 PROCEDURE — 0DJD8ZZ INSPECTION OF LOWER INTESTINAL TRACT, VIA NATURAL OR ARTIFICIAL OPENING ENDOSCOPIC: ICD-10-PCS | Performed by: INTERNAL MEDICINE

## 2022-11-17 PROCEDURE — 82105 ALPHA-FETOPROTEIN SERUM: CPT

## 2022-11-17 PROCEDURE — 87635 SARS-COV-2 COVID-19 AMP PRB: CPT

## 2022-11-17 PROCEDURE — 2580000003 HC RX 258: Performed by: INTERNAL MEDICINE

## 2022-11-17 PROCEDURE — 2709999900 HC NON-CHARGEABLE SUPPLY: Performed by: INTERNAL MEDICINE

## 2022-11-17 PROCEDURE — 3700000001 HC ADD 15 MINUTES (ANESTHESIA): Performed by: INTERNAL MEDICINE

## 2022-11-17 PROCEDURE — 87804 INFLUENZA ASSAY W/OPTIC: CPT

## 2022-11-17 PROCEDURE — 6360000002 HC RX W HCPCS: Performed by: NURSE ANESTHETIST, CERTIFIED REGISTERED

## 2022-11-17 PROCEDURE — 94760 N-INVAS EAR/PLS OXIMETRY 1: CPT

## 2022-11-17 PROCEDURE — 36592 COLLECT BLOOD FROM PICC: CPT

## 2022-11-17 PROCEDURE — 36430 TRANSFUSION BLD/BLD COMPNT: CPT

## 2022-11-17 PROCEDURE — 2500000003 HC RX 250 WO HCPCS: Performed by: NURSE ANESTHETIST, CERTIFIED REGISTERED

## 2022-11-17 PROCEDURE — 3700000000 HC ANESTHESIA ATTENDED CARE: Performed by: INTERNAL MEDICINE

## 2022-11-17 PROCEDURE — APPNB15 APP NON BILLABLE TIME 0-15 MINS: Performed by: NURSE PRACTITIONER

## 2022-11-17 PROCEDURE — 85025 COMPLETE CBC W/AUTO DIFF WBC: CPT

## 2022-11-17 PROCEDURE — APPSS15 APP SPLIT SHARED TIME 0-15 MINUTES: Performed by: NURSE PRACTITIONER

## 2022-11-17 PROCEDURE — 84145 PROCALCITONIN (PCT): CPT

## 2022-11-17 PROCEDURE — 3609020400 HC ILEOSCOPY DX W/COLLJ SPEC WHEN PRFMD: Performed by: INTERNAL MEDICINE

## 2022-11-17 PROCEDURE — 3609012400 HC EGD TRANSORAL BIOPSY SINGLE/MULTIPLE: Performed by: INTERNAL MEDICINE

## 2022-11-17 PROCEDURE — 99223 1ST HOSP IP/OBS HIGH 75: CPT | Performed by: INTERNAL MEDICINE

## 2022-11-17 PROCEDURE — 83036 HEMOGLOBIN GLYCOSYLATED A1C: CPT

## 2022-11-17 PROCEDURE — 0DB68ZX EXCISION OF STOMACH, VIA NATURAL OR ARTIFICIAL OPENING ENDOSCOPIC, DIAGNOSTIC: ICD-10-PCS | Performed by: INTERNAL MEDICINE

## 2022-11-17 PROCEDURE — 85014 HEMATOCRIT: CPT

## 2022-11-17 PROCEDURE — 2580000003 HC RX 258: Performed by: STUDENT IN AN ORGANIZED HEALTH CARE EDUCATION/TRAINING PROGRAM

## 2022-11-17 PROCEDURE — 87536 HIV-1 QUANT&REVRSE TRNSCRPJ: CPT

## 2022-11-17 PROCEDURE — 87517 HEPATITIS B DNA QUANT: CPT

## 2022-11-17 PROCEDURE — 71250 CT THORAX DX C-: CPT

## 2022-11-17 RX ORDER — PROPOFOL 10 MG/ML
INJECTION, EMULSION INTRAVENOUS PRN
Status: DISCONTINUED | OUTPATIENT
Start: 2022-11-17 | End: 2022-11-17 | Stop reason: SDUPTHER

## 2022-11-17 RX ORDER — SODIUM CHLORIDE 9 MG/ML
INJECTION, SOLUTION INTRAVENOUS PRN
Status: CANCELLED | OUTPATIENT
Start: 2022-11-17

## 2022-11-17 RX ORDER — FENTANYL CITRATE 50 UG/ML
INJECTION, SOLUTION INTRAMUSCULAR; INTRAVENOUS PRN
Status: DISCONTINUED | OUTPATIENT
Start: 2022-11-17 | End: 2022-11-17 | Stop reason: SDUPTHER

## 2022-11-17 RX ORDER — METOPROLOL TARTRATE 5 MG/5ML
INJECTION INTRAVENOUS PRN
Status: DISCONTINUED | OUTPATIENT
Start: 2022-11-17 | End: 2022-11-17 | Stop reason: SDUPTHER

## 2022-11-17 RX ORDER — PROPOFOL 10 MG/ML
INJECTION, EMULSION INTRAVENOUS CONTINUOUS PRN
Status: DISCONTINUED | OUTPATIENT
Start: 2022-11-17 | End: 2022-11-17 | Stop reason: SDUPTHER

## 2022-11-17 RX ORDER — EMTRICITABINE 200 MG/1
200 CAPSULE ORAL DAILY
Status: DISCONTINUED | OUTPATIENT
Start: 2022-11-17 | End: 2022-11-23 | Stop reason: HOSPADM

## 2022-11-17 RX ORDER — INSULIN ASPART 100 [IU]/ML
INJECTION, SOLUTION INTRAVENOUS; SUBCUTANEOUS 3 TIMES DAILY
Status: ON HOLD | COMMUNITY
End: 2022-11-22 | Stop reason: HOSPADM

## 2022-11-17 RX ORDER — SODIUM CHLORIDE 9 MG/ML
INJECTION, SOLUTION INTRAVENOUS CONTINUOUS PRN
Status: DISCONTINUED | OUTPATIENT
Start: 2022-11-17 | End: 2022-11-17 | Stop reason: SDUPTHER

## 2022-11-17 RX ORDER — GLYCOPYRROLATE 0.2 MG/ML
INJECTION INTRAMUSCULAR; INTRAVENOUS PRN
Status: DISCONTINUED | OUTPATIENT
Start: 2022-11-17 | End: 2022-11-17 | Stop reason: SDUPTHER

## 2022-11-17 RX ORDER — SODIUM CHLORIDE 0.9 % (FLUSH) 0.9 %
5-40 SYRINGE (ML) INJECTION EVERY 12 HOURS SCHEDULED
Status: CANCELLED | OUTPATIENT
Start: 2022-11-17

## 2022-11-17 RX ORDER — LIDOCAINE HYDROCHLORIDE 20 MG/ML
INJECTION, SOLUTION EPIDURAL; INFILTRATION; INTRACAUDAL; PERINEURAL PRN
Status: DISCONTINUED | OUTPATIENT
Start: 2022-11-17 | End: 2022-11-17 | Stop reason: SDUPTHER

## 2022-11-17 RX ORDER — SODIUM CHLORIDE 0.9 % (FLUSH) 0.9 %
5-40 SYRINGE (ML) INJECTION PRN
Status: CANCELLED | OUTPATIENT
Start: 2022-11-17

## 2022-11-17 RX ADMIN — INSULIN LISPRO 2 UNITS: 100 INJECTION, SOLUTION INTRAVENOUS; SUBCUTANEOUS at 17:51

## 2022-11-17 RX ADMIN — SODIUM CHLORIDE: 9 INJECTION, SOLUTION INTRAVENOUS at 17:35

## 2022-11-17 RX ADMIN — INSULIN LISPRO 2 UNITS: 100 INJECTION, SOLUTION INTRAVENOUS; SUBCUTANEOUS at 09:07

## 2022-11-17 RX ADMIN — Medication 2 PUFF: at 19:17

## 2022-11-17 RX ADMIN — METOPROLOL TARTRATE 0.5 MG: 5 INJECTION INTRAVENOUS at 14:08

## 2022-11-17 RX ADMIN — CEFTRIAXONE 2000 MG: 2 INJECTION, POWDER, FOR SOLUTION INTRAMUSCULAR; INTRAVENOUS at 18:00

## 2022-11-17 RX ADMIN — AZITHROMYCIN MONOHYDRATE 500 MG: 500 INJECTION, POWDER, LYOPHILIZED, FOR SOLUTION INTRAVENOUS at 12:00

## 2022-11-17 RX ADMIN — METOPROLOL TARTRATE 1 MG: 5 INJECTION INTRAVENOUS at 14:11

## 2022-11-17 RX ADMIN — INSULIN LISPRO 2 UNITS: 100 INJECTION, SOLUTION INTRAVENOUS; SUBCUTANEOUS at 13:12

## 2022-11-17 RX ADMIN — QUETIAPINE FUMARATE 800 MG: 150 TABLET, EXTENDED RELEASE ORAL at 20:47

## 2022-11-17 RX ADMIN — SODIUM CHLORIDE, PRESERVATIVE FREE 10 ML: 5 INJECTION INTRAVENOUS at 09:04

## 2022-11-17 RX ADMIN — IPRATROPIUM BROMIDE AND ALBUTEROL 1 PUFF: 20; 100 SPRAY, METERED RESPIRATORY (INHALATION) at 19:17

## 2022-11-17 RX ADMIN — LIDOCAINE HYDROCHLORIDE 80 MG: 20 INJECTION, SOLUTION EPIDURAL; INFILTRATION; INTRACAUDAL; PERINEURAL at 14:05

## 2022-11-17 RX ADMIN — ONDANSETRON 4 MG: 2 INJECTION INTRAMUSCULAR; INTRAVENOUS at 08:45

## 2022-11-17 RX ADMIN — IPRATROPIUM BROMIDE AND ALBUTEROL 1 PUFF: 20; 100 SPRAY, METERED RESPIRATORY (INHALATION) at 12:27

## 2022-11-17 RX ADMIN — PROPOFOL 200 MCG/KG/MIN: 10 INJECTION, EMULSION INTRAVENOUS at 14:05

## 2022-11-17 RX ADMIN — ACETAMINOPHEN 650 MG: 325 TABLET ORAL at 20:47

## 2022-11-17 RX ADMIN — CLONAZEPAM 2 MG: 1 TABLET ORAL at 20:47

## 2022-11-17 RX ADMIN — LIDOCAINE HYDROCHLORIDE 20 MG: 20 INJECTION, SOLUTION EPIDURAL; INFILTRATION; INTRACAUDAL; PERINEURAL at 14:09

## 2022-11-17 RX ADMIN — GLYCOPYRROLATE 0.2 MG: 0.2 INJECTION, SOLUTION INTRAMUSCULAR; INTRAVENOUS at 13:57

## 2022-11-17 RX ADMIN — METOPROLOL TARTRATE 1 MG: 5 INJECTION INTRAVENOUS at 14:09

## 2022-11-17 RX ADMIN — FENTANYL CITRATE 25 MCG: 50 INJECTION INTRAMUSCULAR; INTRAVENOUS at 14:07

## 2022-11-17 RX ADMIN — SODIUM CHLORIDE: 9 INJECTION, SOLUTION INTRAVENOUS at 13:57

## 2022-11-17 RX ADMIN — IPRATROPIUM BROMIDE AND ALBUTEROL 1 PUFF: 20; 100 SPRAY, METERED RESPIRATORY (INHALATION) at 09:05

## 2022-11-17 RX ADMIN — PANTOPRAZOLE SODIUM 40 MG: 40 INJECTION, POWDER, FOR SOLUTION INTRAVENOUS at 09:03

## 2022-11-17 RX ADMIN — PROPOFOL 80 MG: 10 INJECTION, EMULSION INTRAVENOUS at 14:05

## 2022-11-17 RX ADMIN — RALTEGRAVIR 400 MG: 400 TABLET, FILM COATED ORAL at 20:47

## 2022-11-17 RX ADMIN — SODIUM CHLORIDE, PRESERVATIVE FREE 10 ML: 5 INJECTION INTRAVENOUS at 20:47

## 2022-11-17 RX ADMIN — Medication 2 PUFF: at 12:26

## 2022-11-17 RX ADMIN — SODIUM CHLORIDE: 9 INJECTION, SOLUTION INTRAVENOUS at 00:57

## 2022-11-17 ASSESSMENT — LIFESTYLE VARIABLES: SMOKING_STATUS: 0

## 2022-11-17 ASSESSMENT — PAIN SCALES - GENERAL
PAINLEVEL_OUTOF10: 0
PAINLEVEL_OUTOF10: 0

## 2022-11-17 NOTE — CONSULTS
REASON FOR CONSULTATION/CC: abnormal radiograph      Consult at request of Villa Cabrera MD for      PCP: No primary care provider on file. Established Pulmonologist:   None    HISTORY OF PRESENT ILLNESS: Robb Richard is a 47y.o. year old male with a history of   who presents with      He presented secondary to GI bleed from colostomy which started 2 days prior. .      Currently agitated about being NPO. This note may have been  transcribed using [id number] [employer]. Please disregard any translational errors. Assessment:            GERD  Hypertension  Hyperlipidemia  Bipolar  Substance abuse  History of avascular necrosis of the hip  History of rectal cancer  Hepatitis B  HIV    Plan:      Hospital Day 1     Stomal bleeding  Surgery consulted and following. GI consulted    Cirrhosis/hepatitis   GI consulted. Abnormal radiograph  Could be consistent with pneumonia. However, leukocytosis improving quickly. Check procalcitonin. Given his history of malignancy, will order CT chest to assess for mass. Elevated pro calcitonin. Follow daily. CTX and Azithromycin  HIV controlled.       Acute Renal failure with metabolic acidosis  Nephrology consulted  Slowly improving  IVF     Bipolar  Seroquel   out patient Clonazepam    Electrolytes  - K:   - Ca:  - Mg:  - Phos:       Nutrition  - Diet NPO    Mobility       Access  Arterial      PICC          CVC       CVC Triple Lumen 11/16/22 (Active)   $ Central line insertion $ Yes 11/16/22 [address] Being Utilized Yes 11/17/22 0811   Criteria for Appropriate Use Hemodynamically unstable, requiring monitoring lines, vasopressors, or volume resuscitation 11/17/22 0811   Site Assessment Clean, dry & intact 11/17/22 0811   Phlebitis Assessment No symptoms 11/17/22 0811   Infiltration Assessment 0 11/17/22 0811   Color/Movement/Sensation Capillary refill less than 3 sec 11/17/22 0811   Proximal Lumen Color/Status White;Blood return noted; Flushed; Infusing;Capped 11/17/22 0811   Medial Lumen Status No blood return;Flushed;Normal saline locked; Capped;Brown 11/17/22 0811   Distal Lumen Color/Status Blue;Blood return noted; Flushed; Infusing;Capped 11/17/22 0811   Line Care Connections checked and tightened 11/16/22 2030   Alcohol Cap Used Yes 11/17/22 0811   Date of Last Dressing Change 11/16/22 11/17/22 0811   Dressing Type Transparent; Antimicrobial 11/17/22 0811   Dressing Status Old drainage noted 11/17/22 0811   Dressing Intervention New 11/16/22 1851   Number of days: 0                This note was transcribed using [id number] [facility]. Please disregard any translational errors. Thank you for the consult    Denita Rowe [facility]  [phone]             Data:     PAST MEDICAL HISTORY:  Past Medical History:   Diagnosis Date    HIV (human immunodeficiency virus infection) ([facility] [address]        PAST SURGICAL HISTORY:  No past surgical history on file. FAMILY HISTORY:  family history is not on file. SOCIAL HISTORY:   has no history on file for tobacco use.     Scheduled Meds:   raltegravir  400 mg Oral BID    emtricitabine  200 mg Oral Daily    And    Tenofovir Alafenamide Fumarate  25 mg Oral Daily    azithromycin  500 mg IntraVENous Once    mometasone-formoterol  2 puff Inhalation BID    [START ON 11/18/2022] azithromycin  250 mg IntraVENous Q24H    sodium chloride flush  10 mL IntraVENous 2 times per day    pantoprazole  40 mg IntraVENous Daily    cefTRIAXone (ROCEPHIN) IV  2,000 mg IntraVENous Q24H    clonazePAM  2 mg Oral BID    QUEtiapine  800 mg Oral Nightly    insulin lispro  0-8 Units SubCUTAneous TID WC    insulin lispro  0-4 Units SubCUTAneous Nightly    albuterol-ipratropium  1 puff Inhalation TID       Continuous Infusions:   sodium chloride      sodium chloride      sodium chloride 125 mL/hr at 11/17/22 0749    dextrose         PRN Meds:  sodium chloride, sodium chloride flush, sodium chloride, promethazine **OR** ondansetron, polyethylene glycol, acetaminophen **OR** acetaminophen, albuterol, albuterol sulfate HFA, glucose, dextrose bolus **OR** dextrose bolus, glucagon (rDNA), dextrose    ALLERGIES:  Patient has No Known Allergies. REVIEW OF SYSTEMS:  Constitutional: Negative for fever    HENT: Negative for sore throat  Eyes: Negative for redness   Respiratory: Negative for dyspnea, ++ cough  Cardiovascular: Negative for chest pain  Gastrointestinal: Negative for vomiting, diarrhea    Genitourinary: Negative for hematuria   Musculoskeletal: Negative for arthralgias   Skin: Negative for rash  Neurological: Negative for syncope  Hematological: Negative for adenopathy  Psychiatric/Behavorial: Negative for anxiety    Objective:   PHYSICAL EXAM:  Blood pressure 105/72, pulse (!) 121, temperature 99 °F (37.2 °C), temperature source Oral, resp. rate 26, height 6' 1\" (1.854 m), weight 253 lb 12 oz (115.1 kg), SpO2 96 %.'  Gen: No distress. Eyes: PERRL. No sclera icterus. No conjunctival injection. ENT: No discharge. Pharynx clear. External appearance of ears and nose normal.  Neck: Trachea midline. No obvious mass. Resp: No accessory muscle use. No crackles. No wheezes. No rhonchi. CV: Regular rate. Regular rhythm. No murmur or rub. No edema. GI: Non-tender. Non-distended. No hernia. Skin: Warm, dry, normal texture and turgor. No nodule on exposed extremities. Lymph: No cervical LAD. No supraclavicular LAD. M/S: No cyanosis. No clubbing. No joint deformity. Neuro: Moves all four extremities. Psych: Oriented x 3. No anxiety. Awake. Alert. Intact judgement and insight.       Data Reviewed:   LABS:  CBC:   Recent Labs     11/16/22  1316 11/16/22  2324 11/17/22  0458   WBC 14.8*  --  9.7   HGB 10.7* 8.5* 9.9*  9.8*   HCT 32.5* 25.8* 28.8*  28.7*   MCV 90.1  --  90.5     --  70*     BMP:   Recent Labs     11/16/22  1315 11/17/22  0458   * 127*   K 4.5 3.9  3.9 CL 86* 94*   CO2 19* 20*   BUN 32* 36*   CREATININE 1.9* 1.7*     LIVER PROFILE:   Recent Labs     11/17/22  0458   AST 2,172*   ALT 1,223*   BILITOT 1.3*   ALKPHOS 62     PT/INR:   Recent Labs     11/16/22  1316   PROTIME 14.9*   INR 1.18*     APTT: No results for input(s): APTT in the last 72 hours. UA:No results for input(s): NITRITE, COLORU, PHUR, LABCAST, WBCUA, RBCUA, MUCUS, TRICHOMONAS, YEAST, BACTERIA, CLARITYU, SPECGRAV, LEUKOCYTESUR, UROBILINOGEN, BILIRUBINUR, BLOODU, GLUCOSEU, AMORPHOUS in the last 72 hours. Invalid input(s): KETONESU  No results for input(s): PHART, EGY7ZVD, PO2ART in the last 72 hours. Radiology Review:  Pertinent images / reports were reviewed as a part of this visit. CT Chest w/ contrast: No results found for this or any previous visit. CT Chest w/o contrast: No results found for this or any previous visit. CTPA: No results found for this or any previous visit. CXR PA/LAT: No results found for this or any previous visit. CXR portable: Results for orders placed during the hospital encounter of 11/16/22    XR CHEST PORTABLE    Narrative  EXAMINATION:  ONE XRAY VIEW OF THE CHEST    11/16/2022 7:12 pm    COMPARISON:  None. HISTORY:  ORDERING SYSTEM PROVIDED HISTORY: line placement ij  TECHNOLOGIST PROVIDED HISTORY:  Reason for exam:->line placement ij  Reason for Exam: line placement ij    FINDINGS:  Cardiac size and mediastinal structures appear within normal limits. Left  perihilar pneumonia. Bilateral central bronchial thickening. Right-sided  chest port which is partially coiled in the central subclavian  vein/innominate vein region. Left IJ central venous catheter terminates in  the distal left brachiocephalic vein. No acute osseous abnormality is  identified. Impression  Bilateral central bronchial thickening with left perihilar pneumonia. Left IJ central venous catheter terminates in the central left  brachiocephalic vein.     A right-sided chest port is identified, with the mid aspect of the catheter  seen coiled overlying the region of the central right subclavian  vein/innominate vein.

## 2022-11-17 NOTE — CARE COORDINATION
SW attempted to meet with patient today to review possible discharge needs, however, he was asleep. ROHITH will try again later if time permits. Respectfully submitted,    CHOLO Villegas  [facility]   [phone]    Electronically signed by CHOLO Garza on 11/17/2022 at 3:53 PM

## 2022-11-17 NOTE — PLAN OF CARE
Problem: Discharge Planning  Goal: Discharge to home or other facility with appropriate resources  Outcome: Progressing  Flowsheets (Taken 11/16/2022 2030)  Discharge to home or other facility with appropriate resources:   Identify barriers to discharge with patient and caregiver   Arrange for needed discharge resources and transportation as appropriate   Identify discharge learning needs (meds, wound care, etc)   Refer to discharge planning if patient needs post-hospital services based on physician order or complex needs related to functional status, cognitive ability or social support system

## 2022-11-17 NOTE — PROGRESS NOTES
Room computer has a glitch, charge nurse Dorothea Chapa aware. This nurse is unable to scan in tylenol for a high fever of 101.3. Tylenol has been given and swallowed by patient. MD aware of fever.

## 2022-11-17 NOTE — ANESTHESIA POSTPROCEDURE EVALUATION
Department of Anesthesiology  Postprocedure Note    Patient: Barbara Orellana  MRN: [id number]  YOB: 1970  Date of evaluation: 11/17/2022      Procedure Summary     Date: 11/17/22 Room / Location: [address] [place] [address]    Anesthesia Start: 7008 Anesthesia Stop: 0097    Procedures:       EGD BIOPSY      ILEOSCOPY DX ONLY Diagnosis:       Anemia, unspecified type      Lower GI bleed      (Anemia/Bloody Ostomy Output)    Surgeons: Debo Villegas DO Responsible Provider: Sophy Carlson MD    Anesthesia Type: MAC ASA Status: 3          Anesthesia Type: No value filed.     Helene Phase I:      Helene Phase II:        Anesthesia Post Evaluation    Patient location during evaluation: PACU  Patient participation: complete - patient participated  Level of consciousness: awake and alert  Pain score: 0  Airway patency: patent  Nausea & Vomiting: no nausea and no vomiting  Complications: no  Cardiovascular status: blood pressure returned to baseline  Respiratory status: acceptable  Hydration status: euvolemic

## 2022-11-17 NOTE — CONSULTS
[address] [place] [address] Nephrology   [facility]  [phone]  Nephrology Consult Note          Patient ID: Charlette Trinidad  Referring/ Physician: Ian Rodríguez MD      HPI/Summary:   Charlette Trinidad is being seen by nephrology for Acute kidney injury (DEISI). He is a 47 y.o. male with a PMH significant for HIV, HBV with cirrhosis, hypertension who presented to the ED 11/16/2022 with bleeding from his colostomy stoma. Bleeding had been ongoing for 2 days. He was feeling dizzy and lightheaded so he cam to the ED. HE was noted to have a hemoglobin of 10.7, but a Cr of 1.9, and he was severely hypotensive as low as 68/57. He had also been complaining of hematuria but harris placement was difficult so he had to undergo cystoscopic urethral dilation and harris placement. Plan:   Hemodynamically improved after blood transfusion and IVF  Reduce IVF to 100 mL/h  Likely has hypovolemic hyponatremia. Noted chronic right sided hydronephrosis. Assessment:  DEISI:  - baseline Cr 1.0-1.2  - Cr at time of admission was 1.9, associated with bleeding from the stoma, hypotension  - also had urinary retention due to urethral stricture which was dilated cystoscopically by urology upon presentation to the ED due to difficulty placing a harris. - DEISI likely 2/2 hypotension, hemodynamic insult. Acute metabolic acidosis:  - 2/2 ARF and hypotension, improving    Chronic right sided hydronephrosis:  - likely has a solitary functioning left kidney  - Hydronephrosis was present on US in 2021 also. Bleeding stoma:  - per GI and gen-surg. [facility] would like to thank you for the opportunity to serve this patient. Please call with any questions or concerns. Rose Mary Paz MD  [facility]  Diallo Professor Javi Burgess [address], [address]  Fax: [phone]  Office: [phone]         CC/Reason for consult:   Reason for consult:  DEISI  Chief Complaint   Patient presents with    Rectal Bleeding     Pt has been bleeding from colostomy for 2 days, pt is pale and diaphoretic, tachy and hypotensive            Review of Systems:   [address] All other remaining systems are negative. Constitutional:  fever, chills, weakness, weight change, fatigue,      Skin:  rash, pruritus, hair loss, bruising, dry skin, petechiae. Head, Face, Neck   headaches, swelling,  cervical adenopathy. Respiratory: shortness of breath, cough, or wheezing  Cardiovascular: chest pain, palpitations, dizzy, edema  Gastrointestinal: nausea, vomiting, diarrhea, constipation,belly pain    Yellow skin, blood in stool  Musculoskeletal:  back pain, muscle weakness, gait problems,       joint pain or swelling. Genitourinary:  dysuria, poor urine flow, flank pain, blood in urine  Neurologic:  vertigo, TIA'S, syncope, seizures, focal weakness  Psychosocial:  insomnia, anxiety, or depression. Additional positive findings: -     PMH/SH/FH:    Medical Hx: reviewed and updated as appropriate  Past Medical History:   Diagnosis Date    HIV (human immunodeficiency virus infection) ([facility] [address]          Surgical Hx: reviewed and updated as appropriate   has no past surgical history on file. Social Hx: reviewed and updated as appropriate  Social History     Tobacco Use    Smoking status: Not on file    Smokeless tobacco: Not on file   Substance Use Topics    Alcohol use: Not on file        Family hx: reviewed and updated as appropriate  family history is not on file.     Medications:   raltegravir, 400 mg, BID  emtricitabine, 200 mg, Daily   And  Tenofovir Alafenamide Fumarate, 25 mg, Daily  azithromycin, 500 mg, Once  mometasone-formoterol, 2 puff, BID  [START ON 11/18/2022] azithromycin, 250 mg, Q24H  sodium chloride flush, 10 mL, 2 times per day  pantoprazole, 40 mg, Daily  cefTRIAXone (ROCEPHIN) IV, 2,000 mg, Q24H  clonazePAM, 2 mg, BID  QUEtiapine, 800 mg, Nightly  insulin lispro, 0-8 Units, TID WC  insulin lispro, 0-4 Units, Nightly  albuterol-ipratropium, 1 leighton, TID       Patient has no known allergies. Allergies:   No Known Allergies      Physical Exam/Objective:   Vitals:    11/17/22 0900   BP: 105/72   Pulse: (!) 121   Resp: 26   Temp:    SpO2: 96%       Intake/Output Summary (Last 24 hours) at 11/17/2022 1128  Last data filed at 11/17/2022 0900  Gross per 24 hour   Intake 2315.76 ml   Output 965 ml   Net 1350.76 ml         General appearance: in no acute distress, comfortable, communicative, awake and alert. HEENT: no icterus, EOM intact, trachea midline. Neck : no masses, appears symmetrical and no JVD appreciated. Respiratory: Respiratory effort normal, bilateral equal chest rise. No wheeze, no crackles  Cardiovascular: Ausculation shows RRR and  no edema   Abdomen: abdomen is soft, non distended, no masses, no pain with palpation. Musculoskeletal:  no joint swelling, no deformity, strength grossly normal.   Skin: no rashes, no induration, no tightening, no jaundice   Neuro:   Follows commands, moves all extremities spontaneously       Data:   CBC:   Recent Labs     11/16/22  1316 11/16/22  2324 11/17/22  0458   WBC 14.8*  --  9.7   HGB 10.7* 8.5* 9.9*  9.8*   HCT 32.5* 25.8* 28.8*  28.7*     --  70*     BMP:    Recent Labs     11/16/22  1315 11/16/22  2258 11/17/22  0458   *  --  127*   K 4.5  --  3.9  3.9   CL 86*  --  94*   CO2 19*  --  20*   BUN 32*  --  36*   CREATININE 1.9*  --  1.7*   GLUCOSE 347*  --  202*   MG  --  1.70*  --

## 2022-11-17 NOTE — OP NOTE
Endoscopy Note    Patient: Eleni Hernandez  : 1970  Acct#:     Procedure: Esophagogastroduodenoscopy with biopsy, ileoscopy                         Date:  2022     Surgeon:   Lurdes Brooks DO    Referring Physician:  No primary care provider on file. Indications: This is a 47y.o. year old male who presents today with  cirrhosis and acute blood loss anemia . Postoperative Diagnosis:   1) The esophagus was normal without evidence of esophagitis, Sainz's esophagus, strictures, rings, furrowing, masses, or nodules. No varices were noted. GE junction was at 41 cm from the incisors. 2) No gastric varices were noted. There was moderate portal gastropathy noted. 3) Mild erythema of the antrum. Biopsies were obtained from the antrum and body of the stomach to rule out active gastritis and H.pylori gastritis. 4) There were pre-pyloric ulcerations noted. No bleeding stigmata were noted. 5) THere were several clean based ulcers noted in the duodenal bulb and duodenal sweep. 6) The second portion of the duodenum had bile noted. No signs of bleeding were noted. 7) There was a reactive appearing nodule with ulceration noted at the os of the ileostomy. There was no active bleeding. 8) Ileal mucosa was normal.  Brown stool was noted     Anesthesia:  The patient was administered TIVA per anesthesiology team.  Please see their operative records for full details of medications administered. Consent:  The patient or their legal guardian has signed an informed consent, and is aware of the potential risks, benefits, alternatives, and potential complications of this procedure. These include, but are not limited to hemorrhage, bleeding, post procedural pain, perforation, phlebitis, aspiration, hypotension, hypoxia, cardiovascular events such as arryhthmia, and possibly death. Description of Procedure:  The patient was then taken to the endoscopy suite, placed in the left lateral decubitus position and the above IV sedation was administrered. The Olympus video endoscope was placed through the patient's oropharynx without difficulty to the extent of the 2nd portion of the duodenum. Both forward and retroflexed views of the stomach were obtained. Findings:    1) The esophagus was normal without evidence of esophagitis, Sainz's esophagus, strictures, rings, furrowing, masses, or nodules. No varices were noted. GE junction was at 41 cm from the incisors. 2) No gastric varices were noted. There was moderate portal gastropathy noted. 3) Mild erythema of the antrum. Biopsies were obtained from the antrum and body of the stomach to rule out active gastritis and H.pylori gastritis. 4) There were pre-pyloric ulcerations noted. No bleeding stigmata were noted. 5) THere were several clean based ulcers noted in the duodenal bulb and duodenal sweep. 6) The second portion of the duodenum had bile noted. No signs of bleeding were noted. The scope was then withdrawn back into the stomach, it was decompressed, and the scope was completely withdrawn. Then, an upper endoscope was introduced into the ileoscopy site. There was a reactive appearing nodule with ulceration noted at the os of the ileostomy. There was no active bleeding. Ileal mucosa was normal.  Brown stool was noted     The patient tolerated the procedure well and was taken to the post anesthesia care unit in good condition. Estimated blood loss: None    ID Type Source Tests Collected by Time Destination   A : gastric biopsy Gastric Gastric SURGICAL PATHOLOGY Juan Luis E., DO 11/17/2022 0587            Impression:   1) See post procedure diagnoses    Recommendations:   1) Advance diet as tolerated  2) General surgery may need to consider intervention if patient rebleeds. ? Silver nitrate vs oversewing of vessel feeding this area. 3) Patient will need close monitoring of ostomy output  4) ? Ostomy nurse to evaluate appliance since patient reports the current appliance irritates his ostomy.          Ronna Billingsley,   [address] [facility]

## 2022-11-17 NOTE — OP NOTE
[address] [facility]                                OPERATIVE REPORT    PATIENT NAME: Imelda Porter                     :        1970  MED REC NO:   [id number]                          ROOM:       2122  ACCOUNT NO:   [de-identified]                           ADMIT DATE: 2022  PROVIDER:     Bennett Joe. Vel Mcnair MD    DATE OF PROCEDURE:  2022    PREOPERATIVE DIAGNOSES:  Difficult Reece, gross hematuria. POSTOPERATIVE DIAGNOSES:  Ureteral stricture, gross hematuria, difficult  Reece. OPERATION PERFORMED:  Cystoscopy with urethral dilation. SURGEON:  Bennett Joe. Vel Mcnair MD    ANESTHESIA:  Local.    FINDINGS:  Dense penile bulbar urethral stricture, which was dilated  from 12-Palestinian up to 18-Palestinian and then a 16-Palestinian [place]-tip catheter  was placed. DRAINS:  16-Palestinian [place]-tip Reece catheter. SPECIMEN:  None. EBL:  Minimal.    COMPLICATIONS:  None immediate. DISPOSITION:  Stable urologically, but being admitted to the ICU. INDICATION FOR PROCEDURE:  The patient is a 48-year-old gentleman with a  significant history including HIV, hep B, GERD, hyperlipidemia,  hypertension, bipolar disorder, substance abuse, pneumonia and rectal  cancer, status post colostomy many years ago as well as nephrolithiasis  for which he has been treated by Dr. Charley Duran at [place].  He came into the  ER complaining of bleeding from his stoma. He states that he also has  had difficulty voiding and had blood in his urine. ER nurse tried to  place catheter, but was unsuccessful, so I attempted initially to just  place the catheter, but there was dense stricture in the urethra that  was palpable on catheter attempt, so I switched to a cystoscopy. OPERATIVE PROCEDURE:  I initially tried to place a catheter as  mentioned, but using the [employer]rojet and 16-Palestinian [place], but it was  unsuccessful.   I then placed a sensor wire into the urethra, but even  this had some difficulty passing. I then placed a cystoscope and  approximately 4 to 5 cm into the penile urethra encountered a dense  stricture. I was able to place a sensor wire through this still with  some difficulty and advanced it into the bladder. I then removed the  scope and sequentially dilated up using the Bard urologist's tray with  12-Khmer up to an 18-Khmer Nick style dilators. These did enter  into the bladder and clear urine was noted. The wire was advanced  little bit more once it was in. I then removed the dilator and placed  the scope back over the wire and I was able to visualize an area of  stricture throughout the penile and bulbar urethra that was quite dense  and could feel a gripping      on the scope. The prostate was small and  nonobstructing. I had difficulty visualizing within the bladder as it  was full of dark cloudy urine, so I removed the scope and placed a  16-Khmer [place]-tip catheter over top of the wire still with  significant difficulty, but was able to navigate it through the  stricture and into the bladder. The wire was then removed. The balloon  was filled. It was set to drainage. The patient tolerated the  procedure well for just being under local anesthesia and will be  admitted to the ICU as per the plans in the ER.         Talha Flaherty MD    D: 11/16/2022 16:57:25       T: 11/16/2022 20:42:17     [email or web address]  Job#: [id number]     Doc#: [id number]    CC:

## 2022-11-17 NOTE — CARE COORDINATION
INITIAL CASE MANAGEMENT ASSESSMENT     Reviewed chart, met with patient to assess possible discharge needs. Explained Case Management role/services. SW interviewed patient's wife via telephone today. Living Situation: Patient resides in a single family home with his wife and two cats. There are six steps leading up to the front door. Prior to medical admission patient reports that he had no trouble getting in and out of the property. ADLs: Prior to medical admission patient reports that he was independent. Wife stated that she doesn't anticipate any needs at discharge. DME: Prior to medical admission wife reports that he had several canes and a Rolator but did not use them. She stated that she doesn't anticipate any needs at discharge. PT/OT Recs: Not ordered. Active Services: Prior to medical admission wife reports that he had no active services in place. She stated that she doesn't anticipate any needs at discharge. Transportation: Prior to medical admission wife reports that he was an active . She stated that family will likely transport him home at discharge. Medications: Patient receives long term medications from [employer] and short term medications from [employer]. At this time there are no issues with access or affordability. PCP:  Lavaughn Gone. Ulysses Spatz, MD, [phone] (phone) and [phone] (fax number). Patient's last appointment with this provider three days ago. HD/PD: N/A     PLAN/COMMENTS:   1) GI, nephrology and general surgery clearance including advanced diet toleration and pain management. 2) Discharge to home with family. SW provided contact information for patient or family to call with any questions. SW will follow and assist as needed. Respectfully submitted,     CHOLO Aguero  [facility]   [phone]     Electronically signed by CHOLO Parikh on 11/17/2022 at 3:54 PM

## 2022-11-17 NOTE — PROGRESS NOTES
[facility] General Surgery                                   Daily Progress Note                                                         Pt Name: Mihaela Sinha  Medical Record Number: [id number]  Date of Birth 1970   Today's Date: 11/17/2022  Chief Complaint   Patient presents with    Rectal Bleeding     Pt has been bleeding from colostomy for 2 days, pt is pale and diaphoretic, tachy and hypotensive         ASSESSMENT/PLAN  Bleeding from stoma  -none since admission. Scant brown stool in appliance.  -history of appliance irritation causing bleeding, but cannot rule out intraluminal bleeding. FOB + obtained in ED.   -Currently in one piece appliance with ring. Hole cut a little larger than necessary so as not to irritate stoma. Monitor. Cristina Chung is resting in bed. Feeling about the same as yesterday. Denies further bleeding from his stoma. OBJECTIVE  VITALS:  height is 6' 1\" (1.854 m) and weight is 253 lb 12 oz (115.1 kg). His oral temperature is 99 °F (37.2 °C). His blood pressure is 105/72 and his pulse is 121 (abnormal). His respiration is 26 and oxygen saturation is 96%. INTAKE/OUTPUT:    Intake/Output Summary (Last 24 hours) at 11/17/2022 1002  Last data filed at 11/17/2022 0900  Gross per 24 hour   Intake 2315.76 ml   Output 965 ml   Net 1350.76 ml     GENERAL: alert, cooperative, no distress  ABDOMEN: Soft, non tender, non distended. Stoma pink. No blood present. Scant brown stool in appliance. I/O last 3 completed shifts: In: 2132.3 [P.O.:600; I.V.:711.5; Blood:720.8;  IV Piggyback:100]  Out: 690 [Urine:640; Stool:50]      LABS  Recent Labs     11/16/22  1316 11/16/22  2258 11/16/22  2324 11/17/22  0458   WBC 14.8*  --   --  9.7   HGB 10.7*  --    < > 9.9*  9.8*   HCT 32.5*  --    < > 28.8*  28.7*     --   --  70*   NA  --   --   --  127*   K  --   --   --  3.9  3.9   CL  --   --   --  94*   CO2  -- --   --  20*   BUN  --   --   --  36*   CREATININE  --   --   --  1.7*   MG  --  1.70*  --   --    CALCIUM  --   --   --  7.2*   INR 1.18*  --   --   --    AST  --   --   --  2,172*   ALT  --   --   --  1,223*   BILITOT  --   --   --  1.3*    < > = values in this interval not displayed.        EDUCATION  Patient educated about Disease Process, Medications, Smoking Cessation, Oxygenation, Incentive Spirometry and Deep Breath and Cough, Diabetes, Hyperlipidemia, Smoking Cessation, Nutrition, Exercise and Hypertension    Electronically signed by JULIA Benítez CNP on 11/17/2022 at 10:02 AM      [facility]   [phone] Office  [phone] Fax     As above  Stable from GI bleeding standpoint  Given history of bleeding from stoma site that is the likely source  Monitor and continue supportive care    Electronically signed by Patric Escalante MD on 11/17/2022 at 3:32 PM

## 2022-11-17 NOTE — PROGRESS NOTES
[facility]  Respiratory Therapy  Patient Education      Name:  Luis Carlos Kiser  Medical Record Number:  [id number]  Age: 47 y.o.   : 1970  Today's Date:  2022  Room:  C6B-7118         Assessment      BP 99/67   Pulse (!) 131   Temp (!) 101.3 °F (38.5 °C) (Oral) Comment: Giving tylenol  Resp 26   Ht 6' 1\" (1.854 m)   Wt 250 lb 7.1 oz (113.6 kg)   SpO2 98%   BMI 33.04 kg/m²     Patient Active Problem List   Diagnosis    Rectal bleeding       Social History       Modality:     MDI      Education       Patient/caregiver was educated on the proper method of use:  Yes        Level of patient/caregiver understanding able to:  Verbalize understanding, Demonstrate understanding, and Teach back     Education status:   Provided instructions on technique and indications      Response to education:    Excellent     Teaching Time: 5  minutes         Electronically signed by Eboni Terrell RCP on 2022 at 8:49 PM

## 2022-11-17 NOTE — PROGRESS NOTES
Hospitalist Progress Note      PCP: No primary care provider on file. Date of Admission: 11/16/2022    Chief Complaint: Cough, shortness of breath. Hospital Course: Mr. Johanna Lara is a 79-year-old male with medical history significant for hepatitis B virus infection with cirrhosis, type 2 diabetes mellitus, hypertension, HIV, colorectal cancer s/p colostomy who was admitted with symptomatic anemia due to possible GI bleed, acute kidney injury, urine retention, acute hypoxic respiratory failure with left-sided pneumonia. He was transfused 3 unit of blood. Subjective: He is n.p.o. this morning for possible endoscopy this afternoon. He is coughing and feels short of breath. Urology were able to put a Reece catheter yesterday. His fecal occult blood was positive but he denies any blood in the colostomy bag today. His liver enzymes are elevated significantly from his baseline. His ALT is 1223 and AST 2172. He was borderline hypotensive on arrival but never needed inotropic support.       Medications:  Reviewed    Infusion Medications    sodium chloride      sodium chloride      sodium chloride 100 mL/hr at 11/17/22 1300    dextrose       Scheduled Medications    raltegravir  400 mg Oral BID    emtricitabine  200 mg Oral Daily    And    Tenofovir Alafenamide Fumarate  25 mg Oral Daily    mometasone-formoterol  2 puff Inhalation BID    [START ON 11/18/2022] azithromycin  250 mg IntraVENous Q24H    sodium chloride flush  10 mL IntraVENous 2 times per day    pantoprazole  40 mg IntraVENous Daily    cefTRIAXone (ROCEPHIN) IV  2,000 mg IntraVENous Q24H    clonazePAM  2 mg Oral BID    QUEtiapine  800 mg Oral Nightly    insulin lispro  0-8 Units SubCUTAneous TID WC    insulin lispro  0-4 Units SubCUTAneous Nightly    albuterol-ipratropium  1 puff Inhalation TID     PRN Meds: sodium chloride, sodium chloride flush, sodium chloride, promethazine **OR** ondansetron, polyethylene glycol, acetaminophen **OR** bronchial thickening with left perihilar pneumonia. Left IJ central venous catheter terminates in the central left   brachiocephalic vein. A right-sided chest port is identified, with the mid aspect of the catheter   seen coiled overlying the region of the central right subclavian   vein/innominate vein. US ABDOMEN COMPLETE   Final Result   Marked right hydronephrosis and marked right renal parenchymal atrophy. RECOMMENDATIONS:   Unavailable         CT CHEST WO CONTRAST    (Results Pending)       IP CONSULT TO GENERAL SURGERY  IP CONSULT TO GENERAL SURGERY  IP CONSULT TO UROLOGY  IP CONSULT TO GI  IP CONSULT TO NEPHROLOGY  IP CONSULT TO CRITICAL CARE  IP CONSULT TO CRITICAL CARE    Assessment/Plan:    Active Hospital Problems    Diagnosis     Bleeding from colostomy stoma ([facility] [address] [K94.01]      Priority: Medium    Rectal bleeding [K62.5]      Priority: Medium     1. Symptomatic anemia with possible GI bleed. He has received 3 unit of blood transfusion and is on PPI and antibiotic prophylaxis for SBP prophylaxis due to his history of cirrhosis due to hepatitis B. GI is on board and planning for endoscopy this afternoon. Fecal occult blood was positive. Monitor hemoglobin. 2.  Acute urine retention s/p Reece catheter placement by urology. Monitor for infection. .    3.  Acute hypoxic respiratory failure and left-sided pneumonia. Influenza a and B antigen were negative. COVID 19 was negative. Continue ceftriaxone and add azithromycin. Wean off oxygen when possible. Continue as needed albuterol consult pulmonary critical care. 4.  HIV-Per patient his last viral load was undetectable. Continue his home regimen. 5. Severe transaminitis likely due to shock liver as he was borderline hypotensive . His serum ferritin is also over 5000. Check CK,phos and monitor LFTs. Avoid hepatotoxic medications. GI already on board.       DVT Prophylaxis: Mechanical  Diet: Diet NPO  Code Status: Full Code  PT/OT Eval Status: Will need before discharge    [place] likely in 3 to 5 days.     Appropriate for A1 Discharge Unit: Lawanda Weiner MD

## 2022-11-17 NOTE — RT PROTOCOL NOTE
RT Inhaler-Nebulizer Bronchodilator Protocol Note    There is a bronchodilator order in the chart from a provider indicating to follow the RT Bronchodilator Protocol and there is an Initiate RT Inhaler-Nebulizer Bronchodilator Protocol order as well (see protocol at bottom of note). CXR Findings:  No results found. The findings from the last RT Protocol Assessment were as follows:   History Pulmonary Disease: Chronic pulmonary disease  Respiratory Pattern: Mild dyspnea at rest, irregular pattern, or RR 21-25 bpm  Breath Sounds: Intermittent or unilateral wheezes  Cough: Strong, spontaneous, non-productive  Indication for Bronchodilator Therapy: Decreased or absent breath sounds, On home bronchodilators, Wheezing associated with pulm disorder  Bronchodilator Assessment Score: 10    Aerosolized bronchodilator medication orders have been revised according to the RT Inhaler-Nebulizer Bronchodilator Protocol below. Respiratory Therapist to perform RT Therapy Protocol Assessment initially then follow the protocol. Repeat RT Therapy Protocol Assessment PRN for score 0-3 or on second treatment, BID, and PRN for scores above 3. No Indications - adjust the frequency to every 6 hours PRN wheezing or bronchospasm, if no treatments needed after 48 hours then discontinue using Per Protocol order mode. If indication present, adjust the RT bronchodilator orders based on the Bronchodilator Assessment Score as indicated below. Use Inhaler orders unless patient has one or more of the following: on home nebulizer, not able to hold breath for 10 seconds, is not alert and oriented, cannot activate and use MDI correctly, or respiratory rate 25 breaths per minute or more, then use the equivalent nebulizer order(s) with same Frequency and PRN reasons based on the score. If a patient is on this medication at home then do not decrease Frequency below that used at home.     0-3 - enter or revise RT bronchodilator order(s)

## 2022-11-18 LAB
A/G RATIO: 0.8 (ref 1.1–2.2)
AFP: 1.1 UG/L
ALBUMIN SERPL-MCNC: 2.1 G/DL (ref 3.4–5)
ALP BLD-CCNC: 67 U/L (ref 40–129)
ALT SERPL-CCNC: 901 U/L (ref 10–40)
ANION GAP SERPL CALCULATED.3IONS-SCNC: 7 MMOL/L (ref 3–16)
AST SERPL-CCNC: 1175 U/L (ref 15–37)
BASOPHILS ABSOLUTE: 0 K/UL (ref 0–0.2)
BASOPHILS RELATIVE PERCENT: 0.4 %
BILIRUB SERPL-MCNC: 0.7 MG/DL (ref 0–1)
BILIRUBIN DIRECT: 0.5 MG/DL (ref 0–0.3)
BILIRUBIN, INDIRECT: 0.2 MG/DL (ref 0–1)
BUN BLDV-MCNC: 22 MG/DL (ref 7–20)
CALCIUM SERPL-MCNC: 7.4 MG/DL (ref 8.3–10.6)
CHLORIDE BLD-SCNC: 95 MMOL/L (ref 99–110)
CO2: 23 MMOL/L (ref 21–32)
CREAT SERPL-MCNC: 1.1 MG/DL (ref 0.9–1.3)
EOSINOPHILS ABSOLUTE: 0 K/UL (ref 0–0.6)
EOSINOPHILS RELATIVE PERCENT: 0.8 %
ESTIMATED AVERAGE GLUCOSE: 134.1 MG/DL
GFR SERPL CREATININE-BSD FRML MDRD: >60 ML/MIN/{1.73_M2}
GLUCOSE BLD-MCNC: 191 MG/DL (ref 70–99)
GLUCOSE BLD-MCNC: 194 MG/DL (ref 70–99)
GLUCOSE BLD-MCNC: 195 MG/DL (ref 70–99)
GLUCOSE BLD-MCNC: 221 MG/DL (ref 70–99)
GLUCOSE BLD-MCNC: 233 MG/DL (ref 70–99)
HBA1C MFR BLD: 6.3 %
HCT VFR BLD CALC: 26.4 % (ref 40.5–52.5)
HEMOGLOBIN: 8.9 G/DL (ref 13.5–17.5)
LYMPHOCYTES ABSOLUTE: 0.9 K/UL (ref 1–5.1)
LYMPHOCYTES RELATIVE PERCENT: 14.6 %
MCH RBC QN AUTO: 30.2 PG (ref 26–34)
MCHC RBC AUTO-ENTMCNC: 33.9 G/DL (ref 31–36)
MCV RBC AUTO: 89.3 FL (ref 80–100)
MONOCYTES ABSOLUTE: 0.5 K/UL (ref 0–1.3)
MONOCYTES RELATIVE PERCENT: 8.7 %
NEUTROPHILS ABSOLUTE: 4.5 K/UL (ref 1.7–7.7)
NEUTROPHILS RELATIVE PERCENT: 75.5 %
PDW BLD-RTO: 15 % (ref 12.4–15.4)
PERFORMED ON: ABNORMAL
PLATELET # BLD: 66 K/UL (ref 135–450)
PMV BLD AUTO: 10.4 FL (ref 5–10.5)
POTASSIUM REFLEX MAGNESIUM: 3.6 MMOL/L (ref 3.5–5.1)
RBC # BLD: 2.96 M/UL (ref 4.2–5.9)
SODIUM BLD-SCNC: 125 MMOL/L (ref 136–145)
TOTAL PROTEIN: 4.6 G/DL (ref 6.4–8.2)
WBC # BLD: 5.9 K/UL (ref 4–11)

## 2022-11-18 PROCEDURE — 1200000000 HC SEMI PRIVATE

## 2022-11-18 PROCEDURE — 6370000000 HC RX 637 (ALT 250 FOR IP): Performed by: INTERNAL MEDICINE

## 2022-11-18 PROCEDURE — 2580000003 HC RX 258: Performed by: STUDENT IN AN ORGANIZED HEALTH CARE EDUCATION/TRAINING PROGRAM

## 2022-11-18 PROCEDURE — 6370000000 HC RX 637 (ALT 250 FOR IP): Performed by: HOSPITALIST

## 2022-11-18 PROCEDURE — 2700000000 HC OXYGEN THERAPY PER DAY

## 2022-11-18 PROCEDURE — 6370000000 HC RX 637 (ALT 250 FOR IP): Performed by: STUDENT IN AN ORGANIZED HEALTH CARE EDUCATION/TRAINING PROGRAM

## 2022-11-18 PROCEDURE — 2580000003 HC RX 258: Performed by: INTERNAL MEDICINE

## 2022-11-18 PROCEDURE — 2580000003 HC RX 258

## 2022-11-18 PROCEDURE — 36592 COLLECT BLOOD FROM PICC: CPT

## 2022-11-18 PROCEDURE — 97530 THERAPEUTIC ACTIVITIES: CPT

## 2022-11-18 PROCEDURE — 85025 COMPLETE CBC W/AUTO DIFF WBC: CPT

## 2022-11-18 PROCEDURE — 97535 SELF CARE MNGMENT TRAINING: CPT

## 2022-11-18 PROCEDURE — 97162 PT EVAL MOD COMPLEX 30 MIN: CPT

## 2022-11-18 PROCEDURE — 94761 N-INVAS EAR/PLS OXIMETRY MLT: CPT

## 2022-11-18 PROCEDURE — 6360000002 HC RX W HCPCS

## 2022-11-18 PROCEDURE — 80053 COMPREHEN METABOLIC PANEL: CPT

## 2022-11-18 PROCEDURE — 97166 OT EVAL MOD COMPLEX 45 MIN: CPT

## 2022-11-18 PROCEDURE — 94640 AIRWAY INHALATION TREATMENT: CPT

## 2022-11-18 PROCEDURE — 6360000002 HC RX W HCPCS: Performed by: INTERNAL MEDICINE

## 2022-11-18 PROCEDURE — 99233 SBSQ HOSP IP/OBS HIGH 50: CPT | Performed by: INTERNAL MEDICINE

## 2022-11-18 PROCEDURE — C9113 INJ PANTOPRAZOLE SODIUM, VIA: HCPCS | Performed by: INTERNAL MEDICINE

## 2022-11-18 PROCEDURE — 97116 GAIT TRAINING THERAPY: CPT

## 2022-11-18 RX ORDER — DULAGLUTIDE 3 MG/.5ML
3 INJECTION, SOLUTION SUBCUTANEOUS WEEKLY
COMMUNITY

## 2022-11-18 RX ORDER — OXYCODONE HYDROCHLORIDE 5 MG/1
5 TABLET ORAL EVERY 6 HOURS PRN
Status: DISCONTINUED | OUTPATIENT
Start: 2022-11-18 | End: 2022-11-20

## 2022-11-18 RX ADMIN — INSULIN LISPRO 2 UNITS: 100 INJECTION, SOLUTION INTRAVENOUS; SUBCUTANEOUS at 12:09

## 2022-11-18 RX ADMIN — SODIUM CHLORIDE: 9 INJECTION, SOLUTION INTRAVENOUS at 18:18

## 2022-11-18 RX ADMIN — IPRATROPIUM BROMIDE AND ALBUTEROL 1 PUFF: 20; 100 SPRAY, METERED RESPIRATORY (INHALATION) at 21:41

## 2022-11-18 RX ADMIN — RALTEGRAVIR 400 MG: 400 TABLET, FILM COATED ORAL at 21:01

## 2022-11-18 RX ADMIN — CLONAZEPAM 2 MG: 1 TABLET ORAL at 08:39

## 2022-11-18 RX ADMIN — CLONAZEPAM 2 MG: 1 TABLET ORAL at 21:01

## 2022-11-18 RX ADMIN — TENOFOVIR ALAFENAMIDE 25 MG: 25 TABLET ORAL at 08:39

## 2022-11-18 RX ADMIN — RALTEGRAVIR 400 MG: 400 TABLET, FILM COATED ORAL at 08:39

## 2022-11-18 RX ADMIN — IPRATROPIUM BROMIDE AND ALBUTEROL 1 PUFF: 20; 100 SPRAY, METERED RESPIRATORY (INHALATION) at 14:45

## 2022-11-18 RX ADMIN — Medication 2 PUFF: at 09:00

## 2022-11-18 RX ADMIN — SODIUM CHLORIDE, PRESERVATIVE FREE 10 ML: 5 INJECTION INTRAVENOUS at 21:08

## 2022-11-18 RX ADMIN — CEFTRIAXONE 2000 MG: 2 INJECTION, POWDER, FOR SOLUTION INTRAMUSCULAR; INTRAVENOUS at 18:19

## 2022-11-18 RX ADMIN — Medication 2 PUFF: at 21:41

## 2022-11-18 RX ADMIN — PANTOPRAZOLE SODIUM 40 MG: 40 INJECTION, POWDER, FOR SOLUTION INTRAVENOUS at 08:38

## 2022-11-18 RX ADMIN — IPRATROPIUM BROMIDE AND ALBUTEROL 1 PUFF: 20; 100 SPRAY, METERED RESPIRATORY (INHALATION) at 08:59

## 2022-11-18 RX ADMIN — INSULIN LISPRO 2 UNITS: 100 INJECTION, SOLUTION INTRAVENOUS; SUBCUTANEOUS at 17:41

## 2022-11-18 RX ADMIN — OXYCODONE 5 MG: 5 TABLET ORAL at 14:28

## 2022-11-18 RX ADMIN — OXYCODONE 5 MG: 5 TABLET ORAL at 21:33

## 2022-11-18 RX ADMIN — EMTRICITABINE 200 MG: 200 CAPSULE ORAL at 08:39

## 2022-11-18 RX ADMIN — SODIUM CHLORIDE, PRESERVATIVE FREE 10 ML: 5 INJECTION INTRAVENOUS at 08:39

## 2022-11-18 RX ADMIN — AZITHROMYCIN DIHYDRATE 250 MG: 500 INJECTION, POWDER, LYOPHILIZED, FOR SOLUTION INTRAVENOUS at 08:52

## 2022-11-18 RX ADMIN — QUETIAPINE FUMARATE 800 MG: 150 TABLET, EXTENDED RELEASE ORAL at 22:19

## 2022-11-18 RX ADMIN — SODIUM CHLORIDE: 9 INJECTION, SOLUTION INTRAVENOUS at 03:59

## 2022-11-18 ASSESSMENT — PAIN SCALES - GENERAL
PAINLEVEL_OUTOF10: 2
PAINLEVEL_OUTOF10: 0
PAINLEVEL_OUTOF10: 0
PAINLEVEL_OUTOF10: 4

## 2022-11-18 ASSESSMENT — PAIN DESCRIPTION - ORIENTATION
ORIENTATION: LEFT
ORIENTATION: LEFT;LOWER

## 2022-11-18 ASSESSMENT — PAIN DESCRIPTION - LOCATION
LOCATION: ABDOMEN
LOCATION: FLANK;ARM

## 2022-11-18 ASSESSMENT — PAIN DESCRIPTION - DESCRIPTORS
DESCRIPTORS: SHARP
DESCRIPTORS: SHARP

## 2022-11-18 ASSESSMENT — PAIN DESCRIPTION - FREQUENCY: FREQUENCY: INTERMITTENT

## 2022-11-18 ASSESSMENT — PAIN - FUNCTIONAL ASSESSMENT
PAIN_FUNCTIONAL_ASSESSMENT: PREVENTS OR INTERFERES SOME ACTIVE ACTIVITIES AND ADLS
PAIN_FUNCTIONAL_ASSESSMENT: PREVENTS OR INTERFERES SOME ACTIVE ACTIVITIES AND ADLS

## 2022-11-18 ASSESSMENT — PAIN DESCRIPTION - ONSET: ONSET: ON-GOING

## 2022-11-18 ASSESSMENT — PAIN DESCRIPTION - PAIN TYPE: TYPE: ACUTE PAIN

## 2022-11-18 NOTE — PROGRESS NOTES
Patient arrived on the unit, A&O, VSS. IV flushes easily, NS locked with a CDI dressing. IJ CDI, all lumens flushed and capped. NS locked. Patient denies pain at this time. Tolerating PO intake and appetite adequate. No c/o N/V. No other needs verbalized at this time. Standard safety precautions in place. Call light within reach.  Electronically signed by Mike Dean RN on 11/18/2022 at [phone] PM

## 2022-11-18 NOTE — PROGRESS NOTES
Physical Therapy  Facility/Department: [address] ORTHOPEDICS  Physical Therapy Initial Assessment    Name: Winthrop Riedel  : 1970  MRN: [id number]  Date of Service: 2022    Discharge Recommendations:  Continue to assess pending progress, Patient would benefit from continued therapy after discharge (home with HHPT and 24 hr assist vs. 5-7x/wk)   PT Equipment Recommendations  Equipment Needed: No  Other: continue to assess    Winthrop Riedel scored a 17/24 on the AM-PAC short mobility form. Current research shows that an AM-PAC score of 17 or less is typically not associated with a discharge to the patient's home setting. Based on the patient's AM-PAC score and their current functional mobility deficits, it is recommended that the patient have 5-7 sessions per week of Physical Therapy at d/c to increase the patient's independence. At this time, this patient demonstrates complex nursing, medical, and rehabilitative needs, and would benefit from intensive rehabilitation services upon discharge from the Inpatient setting. This patient demonstrates the ability to participate in and benefit from an intensive therapy program with a coordinated interdisciplinary team approach to foster frequent, structured, and documented communication among disciplines, who will work together to establish, prioritize, and achieve treatment goals. Please see assessment section for further patient specific details. If patient discharges prior to next session this note will serve as a discharge summary. Please see below for the latest assessment towards goals. Patient Diagnosis(es): The primary encounter diagnosis was Bleeding from colostomy stoma ([facility] [address] Diagnoses of Hypotension due to hypovolemia, Tachycardia, Hematuria, unspecified type, Goals of care, counseling/discussion, Anemia, unspecified type, and Lower GI bleed were also pertinent to this visit.   Past Medical History:  has a past medical history of HIV (human immunodeficiency virus infection) ([facility] [address] Past Surgical History:  has a past surgical history that includes Upper gastrointestinal endoscopy (N/A, 11/17/2022) and ileoscopy (N/A, 11/17/2022). Assessment   Body Structures, Functions, Activity Limitations Requiring Skilled Therapeutic Intervention: Decreased functional mobility ; Decreased endurance  Assessment: Pt is a 48 y/o male admitted with bleeding from colostomy. Pt lives in a one level home with 5 CORBY with girlfriend and was indep ambulating without an AD PTA. Today, pt limited by decreased activity tolerance, SOB and increased HR. He is far below his functional baseline. Will continue to assess pending progress. Anticipate either d/c home with 24 hour assist and HHPT or intensive inpatient PT prior to returning home. Will continue to follow and assess. Treatment Diagnosis: decreased activity tolerance and functional mobility  Therapy Prognosis: Good  Decision Making: Medium Complexity  History: Per MD Ross \"Patient is a 70-year-old male with past medical history of HIV, hepatitis B cirrhosis hypertension who presents to the hospital for bleeding from colostomy. According to patient he has been bleeding for past 48 hours. He mentions he feels dizzy lightheaded he mentions his bleeding has been slowing down in the past 48 hours. Denied fevers chills diarrhea. \"  Exam: functional mobility, ROM, strength  Clinical Presentation: evolving  Barriers to Learning: none  Requires PT Follow-Up: Yes  Activity Tolerance  Activity Tolerance: Patient limited by endurance; Patient limited by pain; Patient limited by fatigue  Activity Tolerance Comments:  after bed to chair transfer     Plan   Physcial Therapy Plan  General Plan: 3-5 times per week  Current Treatment Recommendations: Strengthening, ROM, Balance training, Functional mobility training, Transfer training, Endurance training, Gait training, Stair training, Home exercise program, Safety education & training, Patient/Caregiver education & training, Equipment evaluation, education, & procurement, Therapeutic activities, Positioning  Safety Devices  Type of Devices: Call light within reach, Chair alarm in place, Gait belt, Left in chair, Nurse notified  Restraints  Restraints Initially in Place: No     Restrictions  Restrictions/Precautions  Restrictions/Precautions: Fall Risk  Position Activity Restriction  Other position/activity restrictions: HIV +, ostomy     Subjective   General  Chart Reviewed: Yes  Patient assessed for rehabilitation services?: Yes  Additional Pertinent Hx: Micheal Hawkins MD \"Patient is a 48-year-old male with past medical history of HIV, hepatitis B cirrhosis hypertension who presents to the hospital for bleeding from colostomy. According to patient he has been bleeding for past 48 hours. He mentions he feels dizzy lightheaded he mentions his bleeding has been slowing down in the past 48 hours. Denied fevers chills diarrhea. \"  Response To Previous Treatment: Not applicable  Family / Caregiver Present: No  Referring Practitioner: Vaishali Cherry MD  Referral Date : 11/18/22  Diagnosis: rectal bleeding  Follows Commands: Within Functional Limits  Subjective  Subjective: Reports pain 3/10 in LLQ when coughing. Agreeable to PT eval and treat.   In bed upon arrival.  SOB at rest.         Social/Functional History  Social/Functional History  Lives With: Significant other  Type of Home: House  Home Layout: One level  Home Access: Stairs to enter with rails  Entrance Stairs - Number of Steps: 5  Entrance Stairs - Rails: Both  Bathroom Shower/Tub: Walk-in shower  Bathroom Toilet: Standard  Bathroom Equipment: Shower chair, Grab bars in shower, Grab bars around toilet  Home Equipment: [place]ela beach, [employer], Walker, 4 wheeled, Grab bars  Has the patient had two or more falls in the past year or any fall with injury in the past year?: No  ADL Assistance: [address] [place] [address] Avenue: Independent  Ambulation Assistance: Independent (no AD)  Transfer Assistance: Independent  Active : Yes  Occupation: On disability  IADL Comments: sleeps in a regular bed  Additional Comments: girlfriend is disabled and does not work    Vision/Hearing  Vision  Vision: Impaired  Vision Exceptions: Wears glasses for reading  Hearing  Hearing: Within functional limits      Cognition   Orientation  Overall Orientation Status: Within Functional Limits     Objective         Gross Assessment  Sensation: Intact     AROM RLE (degrees)  RLE AROM: WFL  AROM LLE (degrees)  LLE AROM : WFL  Strength RLE  Strength RLE: WFL  Strength LLE  Strength LLE: WFL           Bed mobility  Supine to Sit: Stand by assistance (Management of Reece. Effortful, use of rails)  Sit to Supine: Unable to assess (pt in chair at end of treatment)  Bed Mobility Comments: HOB elevated    Transfers  Sit to Stand: Contact guard assistance (verbal cuing for hand placement)  Stand to Sit: Contact guard assistance (verbal cuing for hand placement)    Ambulation  Surface: Level tile  Device: Rolling Walker  Other Apparatus: O2 (Reece, managed by SPTA and OT)  Assistance: Contact guard assistance  Quality of Gait: moderate use of UE on RW  Gait Deviations: Slow Macie;Decreased step length;Decreased step height  Distance: 3' bed to chair  Comments:  BPM seated post ambulation. SpO2 92%  on 3L O2 via NC seated post ambulation.  moderate SOB post ambulation     Balance  Posture: Good  Sitting - Static: Good  Sitting - Dynamic: Good  Standing - Static: Good;-  Standing - Dynamic: Fair;+         AM-PAC Score  AM-PAC Inpatient Mobility Raw Score : 17 (11/18/22 1438)  AM-PAC Inpatient T-Scale Score : 42.13 (11/18/22 1438)  Mobility Inpatient CMS 0-100% Score: 50.57 (11/18/22 1438)  Mobility Inpatient CMS G-Code Modifier : CK (11/18/22 1438)             Goals  Short Term Goals  Time Frame for Short Term Goals: upon d/c  Short Term Goal 1: bed mobility mod I  Short Term Goal 2: transfers mod I  Short Term Goal 3: ambulate 48' with LRAD mod I  Short Term Goal 4: stair assessment  Patient Goals   Patient Goals : \"to go home\"       Education  Patient Education  Education Given To: Patient  Education Provided: Role of Therapy;Plan of Care;Transfer Training  Education Method: Verbal  Barriers to Learning: None  Education Outcome: Verbalized understanding;Continued education needed      Therapy Time   Individual Concurrent Group Co-treatment   Time In 1355         Time Out 1440         Minutes 45         Timed Code Treatment Minutes: 30 Minutes       Electronically signed by Lindsey Isaac, [place] [id number] on 11/18/2022 at 2:45 PM

## 2022-11-18 NOTE — PROGRESS NOTES
Pulmonary Progress Note    Date of Admission: 11/16/2022   LOS: 2 days       CC:  Chief Complaint   Patient presents with    Rectal Bleeding     Pt has been bleeding from colostomy for 2 days, pt is pale and diaphoretic, tachy and hypotensive         Subjective:  Complaining of mild chest pain left upper with breathing. Pleuritic. Complaining of weakness concern he will be able to stand. ROS:   No nausea  No Vomiting  No chest pain      Assessment:     GERD  Hypertension  Hyperlipidemia  Bipolar  Substance abuse  History of avascular necrosis of the hip  History of rectal cancer  Hepatitis B  HIV  Bullous emphysema PI MM normal levels 2011 2018    Plan: This note may have been transcribed using [id number] [employer]. Please disregard any translational errors. Hospital Day: 2     Stomal bleeding  Surgery consulted and following. GI consulted  H&H to stop. No further signs of bleeding. Platelets have decreased to 66. Cirrhosis/hepatitis   GI consulted. Improving      Hyponatremia  Sodium 137 October 10, 2022. Defer to nephrology. Abnormal radiograph  CT consistent with pneumonia  Elevated pro calcitonin. Follow daily. X3 days. CTX and Azithromycin  HIV controlled. Acute Renal failure with metabolic acidosis  Nephrology consulted  Resolved with IV fluids  DC Reece      Bipolar  Seroquel   out patient Clonazepam       Stabilizing. Transfer to [place] [address] with telemetry. Emphysema  CT look similar to bullous emphysema. Versus cyst.  Alpha 1 antitrypsin deficiency negative in 2011 and 2018. Continue Dulera  Wean oxygen  Combivent. Wheezing on exam         Nutrition  - ADULT DIET;  Regular; 4 carb choices (60 gm/meal)  -   Intake/Output Summary (Last 24 hours) at 11/18/2022 0740  Last data filed at 11/18/2022 0637  Gross per 24 hour   Intake 2318.66 ml   Output 3875 ml   Net -1556.34 ml            Access  Arterial      PICC          CVC       CVC Triple Lumen 11/16/22 (Active) $ Central line insertion $ Yes 11/16/22 1648   Central Line Being Utilized Yes 11/18/22 0600   Criteria for Appropriate Use Hemodynamically unstable, requiring monitoring lines, vasopressors, or volume resuscitation 11/18/22 0600   Site Assessment Clean, dry & intact 11/18/22 0600   Phlebitis Assessment No symptoms 11/18/22 0600   Infiltration Assessment 0 11/18/22 0600   Color/Movement/Sensation Capillary refill less than 3 sec 11/18/22 0600   Proximal Lumen Color/Status White; Infusing;Capped 11/18/22 0600   Medial Lumen Status Capped;Blue; Infusing 11/18/22 0600   Distal Lumen Color/Status Capped;Brown;Normal saline locked 11/18/22 0600   Line Care Connections checked and tightened 11/18/22 0600   Alcohol Cap Used Yes 11/18/22 0600   Date of Last Dressing Change 11/17/22 11/18/22 0600   Dressing Type Transparent; Antimicrobial 11/18/22 0600   Dressing Status Clean, dry & intact 11/18/22 0600   Dressing Intervention New 11/17/22 1327   Number of days: 1                 Data:        PHYSICAL EXAM:   Blood pressure 116/70, pulse (!) 110, temperature 98.3 °F (36.8 °C), temperature source Axillary, resp. rate 18, height 6' 1\" (1.854 m), weight 248 lb 0.3 oz (112.5 kg), SpO2 94 %.'  Body mass index is 32.72 kg/m². Gen: No distress. ENT:   Resp: No accessory muscle use. No crackles. ++  wheezes. No rhonchi. CV: Regular rate. Regular rhythm. No murmur or rub. No edema. Skin: Warm, dry, normal texture and turgor. No nodule on exposed extremities. M/S: No cyanosis. No clubbing. No joint deformity. Psych: Oriented x 3. No anxiety. Awake. Alert. Intact judgement and insight. Good Mood / Affect.   Memory appears in tact       Medications:    Scheduled Meds:   raltegravir  400 mg Oral BID    emtricitabine  200 mg Oral Daily    And    Tenofovir Alafenamide Fumarate  25 mg Oral Daily    mometasone-formoterol  2 puff Inhalation BID    azithromycin  250 mg IntraVENous Q24H    sodium chloride flush  10 mL IntraVENous 2 times per day    pantoprazole  40 mg IntraVENous Daily    cefTRIAXone (ROCEPHIN) IV  2,000 mg IntraVENous Q24H    clonazePAM  2 mg Oral BID    QUEtiapine  800 mg Oral Nightly    insulin lispro  0-8 Units SubCUTAneous TID WC    insulin lispro  0-4 Units SubCUTAneous Nightly    albuterol-ipratropium  1 puff Inhalation TID       Continuous Infusions:   sodium chloride      sodium chloride      sodium chloride 100 mL/hr at 11/18/22 0359    dextrose         PRN Meds:  sodium chloride, sodium chloride flush, sodium chloride, promethazine **OR** ondansetron, polyethylene glycol, acetaminophen **OR** acetaminophen, albuterol, albuterol sulfate HFA, glucose, dextrose bolus **OR** dextrose bolus, glucagon (rDNA), dextrose    Labs reviewed:  CBC:   Recent Labs     11/16/22  1316 11/16/22  2324 11/17/22  0458 11/17/22  1204 11/17/22  1749 11/17/22  2305 11/18/22  0445   WBC 14.8*  --  9.7  --   --   --  5.9   HGB 10.7*   < > 9.9*  9.8*   < > 9.3* 8.7* 8.9*   HCT 32.5*   < > 28.8*  28.7*   < > 27.3* 25.8* 26.4*   MCV 90.1  --  90.5  --   --   --  89.3     --  70*  --   --   --  66*    < > = values in this interval not displayed. BMP:   Recent Labs     11/16/22  1315 11/17/22  0458 11/18/22  0445   * 127* 125*   K 4.5 3.9  3.9 3.6   CL 86* 94* 95*   CO2 19* 20* 23   BUN 32* 36* 22*   CREATININE 1.9* 1.7* 1.1     LIVER PROFILE:   Recent Labs     11/17/22 0458 11/18/22  0445   AST 2,172* 1,175*   ALT 1,223* 901*   BILIDIR  --  0.5*   BILITOT 1.3* 0.7   ALKPHOS 62 67     PT/INR:   Recent Labs     11/16/22  1316   PROTIME 14.9*   INR 1.18*     APTT: No results for input(s): APTT in the last 72 hours. UA:No results for input(s): NITRITE, COLORU, PHUR, LABCAST, WBCUA, RBCUA, MUCUS, TRICHOMONAS, YEAST, BACTERIA, CLARITYU, SPECGRAV, LEUKOCYTESUR, UROBILINOGEN, BILIRUBINUR, BLOODU, GLUCOSEU, AMORPHOUS in the last 72 hours.     Invalid input(s): Servando Cintron  No results for input(s): PH, PCO2, PO2 in the last 72 hours. Cx:      Films:  Radiology Review:  Pertinent images / reports were reviewed as a part of this visit. CT Chest w/ contrast: No results found for this or any previous visit. CT Chest w/o contrast: Results for orders placed during the hospital encounter of 11/16/22    CT CHEST WO CONTRAST    Narrative  EXAMINATION:  CT OF THE CHEST WITHOUT CONTRAST 11/17/2022 2:21 pm    TECHNIQUE:  CT of the chest was performed without the administration of intravenous  contrast. Multiplanar reformatted images are provided for review. Automated  exposure control, iterative reconstruction, and/or weight based adjustment of  the mA/kV was utilized to reduce the radiation dose to as low as reasonably  achievable. COMPARISON:  Radiograph 11/16/2022. HISTORY:  ORDERING SYSTEM PROVIDED HISTORY: follow up CT chest  TECHNOLOGIST PROVIDED HISTORY:  Reason for exam:->follow up CT chest  Reason for Exam: follow up CT chest    FINDINGS:  Mediastinum: There are no enlarged lymph nodes. The heart is not enlarged. There is a small pericardial effusion. A left internal jugular catheter  extends to the central aspect of the left brachiocephalic vein. A right  subclavian Port-A-Cath extends to the mid superior vena cava with a loop at  the central aspect of the subclavian vein. Lungs/pleura: There is bullous emphysema. There are patchy confluent  airspace opacities with air bronchograms in the left upper lobe. Linear  opacities at the lung bases likely represent atelectasis or scarring in are  most prominent medially in the left lower lobe. There is no pleural effusion. Upper Abdomen: The liver has a nodular contour in the left lobe is  hypertrophic. The spleen is not entirely included in the field of view, but  appears at least mildly enlarged measuring as much as 14.2 cm in the axial  images. A cyst at the upper pole the right kidney is not entirely included  in the field of view and measures at least 8 cm.   The upper abdomen is  otherwise unremarkable. Soft Tissues/Bones: No suspicious bone lesion is identified. Impression  1. Patchy confluent airspace opacities in the left upper lobe which likely  reflect pneumonia. 2. Bullous emphysema. 3. Left internal jugular venous catheter extending to the central aspect of  the left brachiocephalic vein and right subclavian Port-A-Cath with a loop at  the central aspect of the subclavian vein. 4. Small pericardial effusion. 5. Hepatic cirrhosis and at least mild splenomegaly. CTPA: No results found for this or any previous visit. CXR PA/LAT: No results found for this or any previous visit. CXR portable: Results for orders placed during the hospital encounter of 11/16/22    XR CHEST PORTABLE    Narrative  EXAMINATION:  ONE XRAY VIEW OF THE CHEST    11/16/2022 7:12 pm    COMPARISON:  None. HISTORY:  ORDERING SYSTEM PROVIDED HISTORY: line placement ij  TECHNOLOGIST PROVIDED HISTORY:  Reason for exam:->line placement ij  Reason for Exam: line placement ij    FINDINGS:  Cardiac size and mediastinal structures appear within normal limits. Left  perihilar pneumonia. Bilateral central bronchial thickening. Right-sided  chest port which is partially coiled in the central subclavian  vein/innominate vein region. Left IJ central venous catheter terminates in  the distal left brachiocephalic vein. No acute osseous abnormality is  identified. Impression  Bilateral central bronchial thickening with left perihilar pneumonia. Left IJ central venous catheter terminates in the central left  brachiocephalic vein. A right-sided chest port is identified, with the mid aspect of the catheter  seen coiled overlying the region of the central right subclavian  vein/innominate vein. This note was transcribed using [id number]. Please disregard any translational errors.       [address] [facility]  [phone]

## 2022-11-18 NOTE — CARE COORDINATION
Wound care consulted for bleeding at ostomy site. Per bedside RN, pt cuts appliance to small for stomal opening. Current pouch intact with no bleeding at site or in pouch. Will place recommendations for next pouch change. Will not continue to follow. Please re-consult if needed.   [employer]oplast SenSura Esteban flat 1 pc #[id number]  Rozelle Blade thin #[phone]  Electronically signed by Tyson Ferrari RN CWOCN on 11/18/2022 at 11:46 AM

## 2022-11-18 NOTE — PROGRESS NOTES
MD notified that patient JANICE houston is 3 and he  remains tachycardic with a HR of 100-120. Telemetry orders placed and monitor to be applied to the patient. Awaiting response.  Electronically signed by Dm Lewis RN on 11/18/2022 at 4:12 PM

## 2022-11-18 NOTE — PROGRESS NOTES
[facility]                                               Progress note    CC: bleeding colostomy stoma  Summary:   Winthrop Riedel is being seen by nephrology for Acute kidney injury (DEISI). He is a 47 y.o. male with a PMH significant for HIV, HBV with cirrhosis, hypertension who presented to the ED 11/16/2022 with bleeding from his colostomy stoma. Bleeding had been ongoing for 2 days. He was feeling dizzy and lightheaded so he cam to the ED. HE was noted to have a hemoglobin of 10.7, but a Cr of 1.9, and he was severely hypotensive as low as 68/57. He had also been complaining of hematuria but harris placement was difficult so he had to undergo cystoscopic urethral dilation and harris placement. Interval History  - seen at bedside  - BP labile overnight but improved this AM, 116/70  - tachycardia improved  - excellent urine output, made 3500 mL urine yesterday  - Cr is down to 1.1 but hyponatremia is worse, Na 125 today    Plan:   - check urine osm, urine Na for hyponatremia work up  - will stop IVF today, significant wheezing bilaterally. Complaining of mild SOB. - clinically euvolemic now. - noted planned for transfer to the floor today      Mayur Ayala MD  [facility]  Office: [phone]    Assessment:   DEISI:  - baseline Cr 1.0-1.2  - Cr at time of admission was 1.9, associated with bleeding from the stoma, hypotension  - also had urinary retention due to urethral stricture which was dilated cystoscopically by urology upon presentation to the ED due to difficulty placing a harris. - DEISI likely 2/2 hypotension, hemodynamic insult. Acute metabolic acidosis:  - 2/2 ARF and hypotension, improving     Chronic right sided hydronephrosis:  - likely has a solitary functioning left kidney  - Hydronephrosis was present on US in 2021 also.      Bleeding stoma:  - per GI and gen-surg.  - s/p EGD 11/17/2022, mild portal gastropathy, pre-pyloric ulcerations, clean based ulcers in the duodenal bulb, no signs of active bleeding.  - ileoscopy was also done showing reactive appearing nodule with ulceration at the os of the ostomy. ROS:     Positives Listed Bold. All other remaining systems are negative. Constitutional:  fever, chills, weakness, weight change, fatigue,      Skin:  rash, pruritus, hair loss, bruising, dry skin, petechiae. Head, Face, Neck   headaches, swelling,  cervical adenopathy. Respiratory: shortness of breath, cough, or wheezing  Cardiovascular: chest pain, palpitations, dizzy, edema  Gastrointestinal: nausea, vomiting, diarrhea, constipation,belly pain    Yellow skin, blood in stool  Musculoskeletal:  back pain, muscle weakness, gait problems,       joint pain or swelling. Genitourinary:  dysuria, poor urine flow, flank pain, blood in urine  Neurologic:  vertigo, TIA'S, syncope, seizures, focal weakness  Psychosocial:  insomnia, anxiety, or depression. Additional positive findings: -     PMH:   Past medical history, surgical history, social history, family history are reviewed and updated as appropriate. Reviewed current medication list.   Allergies reviewed and updated as needed. PE:   Vitals:    11/18/22 0600   BP: 116/70   Pulse: (!) 110   Resp: 18   Temp:    SpO2: 94%       General appearance: in NAD, fully alert and oriented. Comfortable. HEENT: EOM intact, no icterus. Trachea is midline. Neck : No masses, appears symmetrical, no JVD  Respiratory: Respiratory effort appears normal, bilateral equal chest rise, + wheeze, no crackles  Cardiovascular: Ausculation shows RRR trace edema  Abdomen: No visible mass or tenderness, non distended. Musculoskeletal:  Joints with no swelling or deformity. Skin:no rashes, ulcers, induration, no jaundice. Neuro: face symmetric, no focal deficits. Appropriate responses.        Lab Results   Component Value Date    CREATININE 1.1 11/18/2022    BUN 22 (H) 11/18/2022     (L) 11/18/2022    K 3.6 11/18/2022    CL 95 (L) 11/18/2022    CO2 23 11/18/2022      Lab Results   Component Value Date    WBC 5.9 11/18/2022    HGB 8.9 (L) 11/18/2022    HCT 26.4 (L) 11/18/2022    MCV 89.3 11/18/2022    PLT 66 (L) 11/18/2022     Lab Results   Component Value Date    CALCIUM 7.4 (L) 11/18/2022    PHOS 2.2 06/03/2021

## 2022-11-18 NOTE — PROGRESS NOTES
Hospitalist Progress Note      PCP: Erendira Parsons. Rebecca Barnard MD, MD    Date of Admission: 11/16/2022    Chief Complaint: bleeding from colostomy    Hospital Course:  74-year-old male with past medical history of HIV, hepatitis B cirrhosis, had a wide perineal resection and APR with diverting colostomy due to a HPV related squamous cell anal cancer, diabetes, chronic hepatitis B, HIV, fatty liver disease with cirrhosis followed by Dr. Shanel House at [facility]. His HIV is well controlled on antiretroviral therapy. on tenofovir for his hepatitis B. He has followed with infectious disease and has had an undetectable viral load and a good CD4 count. The patient has had a diverting colostomy since 2001. He has been followed by Dr. Lissa Ho. Hypertension. presents to the hospital for reported seeing pulsatile maroon blood from colostomy. According to patient he has been bleeding for past 48 hours. He mentions he feels dizzy lightheaded he mentions his bleeding has been slowing down in the past 48 hours. Denied fevers chills diarrhea. Seen by GI had EGD w bx and ileoscopy 11/17/22      EGD w bx and ileoscopy  Postoperative Diagnosis:   1) The esophagus was normal without evidence of esophagitis, Sainz's esophagus, strictures, rings, furrowing, masses, or nodules. No varices were noted. GE junction was at 41 cm from the incisors. 2) No gastric varices were noted. There was moderate portal gastropathy noted. 3) Mild erythema of the antrum. Biopsies were obtained from the antrum and body of the stomach to rule out active gastritis and H.pylori gastritis. 4) There were pre-pyloric ulcerations noted. No bleeding stigmata were noted. 5) THere were several clean based ulcers noted in the duodenal bulb and duodenal sweep. 6) The second portion of the duodenum had bile noted. No signs of bleeding were noted. 7) There was a reactive appearing nodule with ulceration noted at the os of the ileostomy.   There was no active bleeding. 8) Ileal mucosa was normal.  Brown stool was noted    Findings:     1) The esophagus was normal without evidence of esophagitis, Sainz's esophagus, strictures, rings, furrowing, masses, or nodules. No varices were noted. GE junction was at 41 cm from the incisors. 2) No gastric varices were noted. There was moderate portal gastropathy noted. 3) Mild erythema of the antrum. Biopsies were obtained from the antrum and body of the stomach to rule out active gastritis and H.pylori gastritis. 4) There were pre-pyloric ulcerations noted. No bleeding stigmata were noted. 5) THere were several clean based ulcers noted in the duodenal bulb and duodenal sweep. 6) The second portion of the duodenum had bile noted. No signs of bleeding were noted. The scope was then withdrawn back into the stomach, it was decompressed, and the scope was completely withdrawn. Then, an upper endoscope was introduced into the ileoscopy site. There was a reactive appearing nodule with ulceration noted at the os of the ileostomy. There was no active bleeding.  Ileal mucosa was normal.  Brown stool was noted     Subjective: in OR today for revascularization        Medications:  Reviewed    Infusion Medications    sodium chloride      sodium chloride      sodium chloride 100 mL/hr at 11/18/22 0359    dextrose       Scheduled Medications    raltegravir  400 mg Oral BID    emtricitabine  200 mg Oral Daily    And    Tenofovir Alafenamide Fumarate  25 mg Oral Daily    mometasone-formoterol  2 puff Inhalation BID    azithromycin  250 mg IntraVENous Q24H    sodium chloride flush  10 mL IntraVENous 2 times per day    pantoprazole  40 mg IntraVENous Daily    cefTRIAXone (ROCEPHIN) IV  2,000 mg IntraVENous Q24H    clonazePAM  2 mg Oral BID    QUEtiapine  800 mg Oral Nightly    insulin lispro  0-8 Units SubCUTAneous TID WC    insulin lispro  0-4 Units SubCUTAneous Nightly    albuterol-ipratropium  1 puff Inhalation TID     PRN Meds: sodium chloride, sodium chloride flush, sodium chloride, promethazine **OR** ondansetron, polyethylene glycol, acetaminophen **OR** acetaminophen, albuterol, albuterol sulfate HFA, glucose, dextrose bolus **OR** dextrose bolus, glucagon (rDNA), dextrose      Intake/Output Summary (Last 24 hours) at 11/18/2022 0942  Last data filed at 11/18/2022 7210  Gross per 24 hour   Intake 2135.19 ml   Output 3600 ml   Net -1464.81 ml       Physical Exam Performed:    /70   Pulse (!) 105   Temp 98.3 °F (36.8 °C) (Axillary)   Resp 20   Ht 6' 1\" (1.854 m)   Wt 248 lb 0.3 oz (112.5 kg)   SpO2 95%   BMI 32.72 kg/m²   Gen: No distress. HOB elevated obese   ENT: mucosa moist goatee  Resp: rales MATHIEU O22L No rhonchi. sl winded   CV: RRR No murmur or rub. Extremity No edeno jaundice no pallor   Abdomen obese NT   M/S: No cyanosis. No clubbing. No joint deformity. Psych: Oriented x 3   harris clear drk yellow         Labs:   Recent Labs     11/16/22  1316 11/16/22  2324 11/17/22  0458 11/17/22  1204 11/17/22  1749 11/17/22  2305 11/18/22  0445   WBC 14.8*  --  9.7  --   --   --  5.9   HGB 10.7*   < > 9.9*  9.8*   < > 9.3* 8.7* 8.9*   HCT 32.5*   < > 28.8*  28.7*   < > 27.3* 25.8* 26.4*     --  70*  --   --   --  66*    < > = values in this interval not displayed. Recent Labs     11/16/22  1315 11/17/22  0458 11/18/22  0445   * 127* 125*   K 4.5 3.9  3.9 3.6   CL 86* 94* 95*   CO2 19* 20* 23   BUN 32* 36* 22*   CREATININE 1.9* 1.7* 1.1   CALCIUM 8.4 7.2* 7.4*     Recent Labs     11/17/22  0458 11/18/22  0445   AST 2,172* 1,175*   ALT 1,223* 901*   BILIDIR  --  0.5*   BILITOT 1.3* 0.7   ALKPHOS 62 67     Recent Labs     11/16/22  1316   INR 1.18*     No results for input(s): CKTOTAL, TROPONINI in the last 72 hours. Urinalysis:    No results found for: Collette Sheffield, BACTERIA, RBCUA, BLOODU, [address] [place] [address] [place] [address]    Radiology:  CT CHEST WO CONTRAST   Final Result   1.  Patchy confluent airspace opacities in the left upper lobe which likely   reflect pneumonia. 2. Bullous emphysema. 3. Left internal jugular venous catheter extending to the central aspect of   the left brachiocephalic vein and right subclavian Port-A-Cath with a loop at   the central aspect of the subclavian vein. 4. Small pericardial effusion. 5. Hepatic cirrhosis and at least mild splenomegaly. XR CHEST PORTABLE   Final Result   Bilateral central bronchial thickening with left perihilar pneumonia. Left IJ central venous catheter terminates in the central left   brachiocephalic vein. A right-sided chest port is identified, with the mid aspect of the catheter   seen coiled overlying the region of the central right subclavian   vein/innominate vein. US ABDOMEN COMPLETE   Final Result   Marked right hydronephrosis and marked right renal parenchymal atrophy.       RECOMMENDATIONS:   Unavailable             IP CONSULT TO GENERAL SURGERY  IP CONSULT TO GENERAL SURGERY  IP CONSULT TO UROLOGY  IP CONSULT TO GI  IP CONSULT TO NEPHROLOGY  IP CONSULT TO CRITICAL CARE  IP CONSULT TO CRITICAL CARE    Assessment/Plan:    Active Hospital Problems    Diagnosis     Bleeding from colostomy stoma ([facility] [address] [K94.01]      Priority: Medium    Rectal bleeding [K62.5]      Priority: Medium     Cough  Pleuritic chest pain  Fever  -Temp 100.5 11/17/22 2000  Flu/covid neg  Zithro/rocephin   Tobacco dependence  Bullous emphyesema  --1ppd  --educated pt on lung findings on cxr with emphysema recommend quit      Tachycardia  Acute hypoxic respiratory failure  --O2 3L NC  dulera  Previous IVDA cocaine and meth    Anemia  --Hgb stable 8.9  Iron 46, TIBC 213 %sat 22    Portacath right chest wall    Cirrhosis liver  Chronic Hepatitis B  HIV  --AFP tumor marker    fatty liver disease with cirrhosis followed by Dr. Angely Nunez at [address] HIV meds     cancer  Colostomy  Hx APR diverting colostomy   due to a HPV related squamous cell anal cancer    Downgraded to floor status  PT OT      DVT Prophylaxis:   Diet: ADULT DIET; Regular; 4 carb choices (60 gm/meal)  Code Status: Full Code  PT/OT Eval Status: ordered    Dispo - home w wife ?  Need Pily Caldera MD

## 2022-11-18 NOTE — PROGRESS NOTES
Occupational Therapy  Facility/Department: [address] ORTHOPEDICS  Occupational Therapy Initial Assessment    Name: Devan Jarrell  : 1970  MRN: [id number]  Date of Service: 2022    Discharge Recommendations:  Continue to assess pending progress (Will cont to assess for d/c plan pending progress; home with 24 hr SUP and HHOT verse OT 5-7x/week if limited progress noted)        Devan Jarrell scored a 15/24 on the AM-PAC ADL Inpatient form. Current research shows that an AM-PAC score of 17 or less is typically not associated with a discharge to the patient's home setting. Based on the patient's AM-PAC score and their current ADL deficits, it is recommended that the patient have 5-7 sessions per week of Occupational Therapy at d/c to increase the patient's independence. At this time, this patient demonstrates complex nursing, medical, and rehabilitative needs, and would benefit from intensive rehabilitation services upon discharge from the Inpatient setting. This patient demonstrates the ability to participate in and benefit from an intensive therapy program with a coordinated interdisciplinary team approach. Please see assessment section for further patient specific details. If patient discharges prior to next session this note will serve as a discharge summary. Please see below for the latest assessment towards goals. Patient Diagnosis(es): The primary encounter diagnosis was Bleeding from colostomy stoma ([facility] [address] Diagnoses of Hypotension due to hypovolemia, Tachycardia, Hematuria, unspecified type, Goals of care, counseling/discussion, Anemia, unspecified type, and Lower GI bleed were also pertinent to this visit. Past Medical History:  has a past medical history of HIV (human immunodeficiency virus infection) ([facility] [address] Past Surgical History:  has a past surgical history that includes Upper gastrointestinal endoscopy (N/A, 2022) and ileoscopy (N/A, 2022).     Treatment Diagnosis: impaired ADL/fxl mobility    Assessment   Performance deficits / Impairments: Decreased functional mobility ; Decreased endurance;Decreased ADL status; Decreased high-level IADLs;Decreased balance  Assessment: 46 yo male admitted for bleeding of colostomy stoma, Cirrhosis/hepatitis, and Upper respiratory infection with emphysema. PMH: GERD  HTN, Hyperlipidemia, Bipolar, Substance abuse, avascular necrosis LLE (DARYN, 2014), rectal cancer, Hepatitis B, HIV. PTA, pt lives with SO and fully IND no use of AD. Today, pt functioning below baseline most limited by decreased endurance and balance. Pt completes bed mobility SBA, toileting Total A, fxl tx CGA, standing balance at RW CGA, and short fxl mobility with RW with CGA. Pt requiring cues for deep breathing d/t significant fatigue and mod SOB- HR up to 132; limiting further distance mobility. Additionally, max increased time for seated oral care d/t fatigue and rest breaks. Anticipate Mod A LB and set up UB ADL based on balance, endurance, cognition, pain and PLOF. Cont acute OT to address above deficits. Will cont to assess for d/c plan pending progress; home with 24 hr SUP and HHOT verse OT 5-7x/week if limited progress noted  Treatment Diagnosis: impaired ADL/fxl mobility  Prognosis: Fair;Good  Decision Making: Medium Complexity  REQUIRES OT FOLLOW-UP: Yes  Activity Tolerance  Activity Tolerance: Patient limited by pain; Patient limited by fatigue  Activity Tolerance Comments: SOB noted with brief activity- cues for deep breathing. SpO2 >92% throughout.  HR up to 132 with activity, down to 120 at EOS- RN aware        Plan   Occupational Therapy Plan  Times Per Week: 5  Current Treatment Recommendations: Strengthening, ROM, Balance training, Functional mobility training, Endurance training, Pain management, Safety education & training, Self-Care / ADL, Home management training, Modalities, Positioning, Patient/Caregiver education & training functional limits  Strength: Within functional limits  Coordination: Within functional limits  Tone: Normal    ADL  Grooming: Setup; Increased time to complete  Grooming Skilled Clinical Factors: seated in recliner with BLE on floor: oral care (partials and teeth)- pt very thorough, requiring various rest breaks d/t fatigue and SOB  Toileting: Dependent/Total  Toileting Skilled Clinical Factors: harris  Additional Comments: Anticipate Mod A LB and set up UB ADL based on balance, endurance, cognition, pain and PLOF       Activity Tolerance  Activity Tolerance: Patient limited by endurance; Patient limited by pain; Patient limited by fatigue  Activity Tolerance Comments:  after bed to chair transfer    Bed mobility  Supine to Sit: Stand by assistance (Management of Harris. Effortful, use of rails)  Sit to Supine: Unable to assess (pt in chair at end of treatment)  Bed Mobility Comments: HOB elevated    Transfers  Sit to stand: Contact guard assistance  Stand to sit: Contact guard assistance  Transfer Comments: RW. cues hand placement (needs reinforcement).                     Standing balance: CGA with BUE on RW ~30 sec to obtain balance/ensure not dizzy                    Fxl mobility: CGA with RW short distance- EOB>recliner with increased time d/t fatigue    Vision  Vision: Impaired  Vision Exceptions: Wears glasses for reading  Hearing  Hearing: Within functional limits    Cognition  Overall Cognitive Status: WFL  Orientation  Overall Orientation Status: Within Functional Limits                    Education Given To: Patient  Education Provided: Role of Therapy;Plan of Care;Transfer Training  Education Method: Demonstration;Verbal  Barriers to Learning: None  Education Outcome: Verbalized understanding;Demonstrated understanding;Continued education needed                        AM-PAC Score        AM-PAC Inpatient Daily Activity Raw Score: 15 (11/18/22 1448)  AM-PAC Inpatient ADL T-Scale Score : 34.69 (11/18/22 1448)  ADL Inpatient CMS 0-100% Score: 56.46 (11/18/22 1448)  ADL Inpatient CMS G-Code Modifier : CK (11/18/22 1448)    Goals  Short Term Goals  Time Frame for Short Term Goals: prior to d/c  Short Term Goal 1: toileting SBA  Short Term Goal 2: LB dressing with AE as needed SBA  Short Term Goal 3: tolerates 3 min fxl standing task SBA  Short Term Goal 4: UB bathing/dressing seated set up  Short Term Goal 5: fxl tx and mobility with RW household distances SBA  Patient Goals   Patient goals : improve endurance and return home       Therapy Time   Individual Concurrent Group Co-treatment   Time In 1355         Time Out 1440         Minutes 45         Timed Code Treatment Minutes: 30 Minutes (15 eval. 15 ADL 15 TA)       Megan Grier, MESSI, OTR/L

## 2022-11-18 NOTE — PROGRESS NOTES
Progress note    Stable overall, no signs of bleeding    Monitor    No additional plans    Electronically signed by Vesna Palomo MD on 11/18/2022 at 3:09 PM

## 2022-11-18 NOTE — PROGRESS NOTES
INPATIENT PROGRESS NOTE        IDENTIFYING DATA/REASON FOR CONSULTATION   PATIENT:  Bob Jennings  MRN:  [id number]  ADMIT DATE: 2022  TIME OF EVALUATION: 2022 11:00 AM  HOSPITAL STAY:   LOS: 2 days   CONSULTING PHYSICIAN: Letty Hartley MD   REASON FOR CONSULTATION:     Subjective:    Patient seen in follow up   He report of chest pain and shortness of breath  Denies abd pain  No bleeding from ostomy overnight    MEDICATIONS   SCHEDULED:  raltegravir, 400 mg, BID  emtricitabine, 200 mg, Daily   And  Tenofovir Alafenamide Fumarate, 25 mg, Daily  mometasone-formoterol, 2 puff, BID  azithromycin, 250 mg, Q24H  sodium chloride flush, 10 mL, 2 times per day  pantoprazole, 40 mg, Daily  cefTRIAXone (ROCEPHIN) IV, 2,000 mg, Q24H  clonazePAM, 2 mg, BID  QUEtiapine, 800 mg, Nightly  insulin lispro, 0-8 Units, TID WC  insulin lispro, 0-4 Units, Nightly  albuterol-ipratropium, 1 puff, TID      FLUIDS/DRIPS:     sodium chloride      sodium chloride      sodium chloride 100 mL/hr at 22 0359    dextrose       PRNs: sodium chloride, , PRN  sodium chloride flush, 10 mL, PRN  sodium chloride, , PRN  promethazine, 12.5 mg, Q6H PRN   Or  ondansetron, 4 mg, Q6H PRN  polyethylene glycol, 17 g, Daily PRN  acetaminophen, 650 mg, Q6H PRN   Or  acetaminophen, 650 mg, Q6H PRN  albuterol, 2.5 mg, Q4H PRN  albuterol sulfate HFA, 2 puff, Q6H PRN  glucose, 4 tablet, PRN  dextrose bolus, 125 mL, PRN   Or  dextrose bolus, 250 mL, PRN  glucagon (rDNA), 1 mg, PRN  dextrose, , Continuous PRN      ALLERGIES:  No Known Allergies      PHYSICAL EXAM   VITALS:  /63   Pulse (!) 108   Temp 98.4 °F (36.9 °C) (Oral)   Resp 21   Ht 6' 1\" (1.854 m)   Wt 248 lb 0.3 oz (112.5 kg)   SpO2 94%   BMI 32.72 kg/m²   TEMPERATURE:  Current - Temp: 98.4 °F (36.9 °C);  Max - Temp  Av.6 °F (37 °C)  Min: 97.6 °F (36.4 °C)  Max: 100.5 °F (38.1 °C)    Physical Exam:  General appearance: alert, cooperative, no distress, appears stated age  Eyes: Anicteric  Head: Normocephalic, without obvious abnormality  Lungs: clear to auscultation bilaterally  Heart: regular rate and rhythm, normal S1 and S2  Abdomen: soft, non-distended, non-tender. Ostomy LLQ with brown liquid stool in bag. Skin: warm and dry, no jaundice  Neuro: Grossly intact, A&OX3    LABS AND IMAGING   Laboratory   Recent Labs     11/16/22  1316 11/16/22  2324 11/17/22  0458 11/17/22  1204 11/17/22  1749 11/17/22  2305 11/18/22  0445   WBC 14.8*  --  9.7  --   --   --  5.9   HGB 10.7*   < > 9.9*  9.8*   < > 9.3* 8.7* 8.9*   HCT 32.5*   < > 28.8*  28.7*   < > 27.3* 25.8* 26.4*   MCV 90.1  --  90.5  --   --   --  89.3     --  70*  --   --   --  66*    < > = values in this interval not displayed. Recent Labs     11/16/22  1315 11/17/22  0458 11/18/22  0445   * 127* 125*   K 4.5 3.9  3.9 3.6   CL 86* 94* 95*   CO2 19* 20* 23   BUN 32* 36* 22*   CREATININE 1.9* 1.7* 1.1     Recent Labs     11/17/22  0458 11/18/22  0445   AST 2,172* 1,175*   ALT 1,223* 901*   BILIDIR  --  0.5*   BILITOT 1.3* 0.7   ALKPHOS 62 67     No results for input(s): LIPASE, AMYLASE in the last 72 hours. Recent Labs     11/16/22  1316   PROTIME 14.9*   INR 1.18*         Imaging  CT CHEST WO CONTRAST   Final Result   1. Patchy confluent airspace opacities in the left upper lobe which likely   reflect pneumonia. 2. Bullous emphysema. 3. Left internal jugular venous catheter extending to the central aspect of   the left brachiocephalic vein and right subclavian Port-A-Cath with a loop at   the central aspect of the subclavian vein. 4. Small pericardial effusion. 5. Hepatic cirrhosis and at least mild splenomegaly. XR CHEST PORTABLE   Final Result   Bilateral central bronchial thickening with left perihilar pneumonia. Left IJ central venous catheter terminates in the central left   brachiocephalic vein.       A right-sided chest port is identified, with the mid aspect of the catheter   seen coiled overlying the region of the central right subclavian   vein/innominate vein. US ABDOMEN COMPLETE   Final Result   Marked right hydronephrosis and marked right renal parenchymal atrophy. RECOMMENDATIONS:   Unavailable             Endoscopy  EGD with ileoscopy 11/17/22  1) The esophagus was normal without evidence of esophagitis, Sainz's esophagus, strictures, rings, furrowing, masses, or nodules. No varices were noted. GE junction was at 41 cm from the incisors. 2) No gastric varices were noted. There was moderate portal gastropathy noted. 3) Mild erythema of the antrum. Biopsies were obtained from the antrum and body of the stomach to rule out active gastritis and H.pylori gastritis. 4) There were pre-pyloric ulcerations noted. No bleeding stigmata were noted. 5) THere were several clean based ulcers noted in the duodenal bulb and duodenal sweep. 6) The second portion of the duodenum had bile noted. No signs of bleeding were noted. 7) There was a reactive appearing nodule with ulceration noted at the os of the ileostomy. There was no active bleeding. 8) Ileal mucosa was normal.  Brown stool was noted        ASSESSMENT AND RECOMMENDATIONS   Karen Horton is a 47 y.o. male with PMH of DM, HBV, Fatty liver disease with cirrhosis, wide perineal resection and APR with diverting colostomy (2001) due to a HPV related squamous cell anal cancer who presented 11/16/22 with bleeding per ostomy    Acute blood loss anemia with hemorrhagic shock- presumable 2/2 stoma bleeding. Ileoscopy showed reactive appearing nodule with ulceration noted at the os of the ileostomy. Ileal mucosa was normal with brown stool. EGD was neg for variceal bleeding. Hgb stable. Pneumonia. On ceftriaxone and azithromycin     Ischemic hepatitis. LFTs improving    Cirrhosis  -etiology:   Patient with chronic HBV on Tenofovir. He reports diagnosis of MAGALLON.    Follows at [facility]   -EV: none on EGD 22  -Ascites: no obvious ascites on exam.  -HE: none No recent EGD. -[facility] [address] screenin [address] with no obvious liver lesion, AFP pending    HIV/Hep B coinfection. HBV RNA pending. On emtricitabine/tenofovir    Hx of anal cancer s/p APR    RECOMMENDATIONS:    Ostomy nurse to evaluate appliance since patient reports the current appliance irritates his ostomy. If pt rebleeds, ? Silver nitrate vs oversewing of vessel feeding this area but will defer this to Gen Surgery who is following  Follow up at [facility] after discharge for management of HBV, cirrhosis      If you have any questions or need any further information, please feel free to contact us [phone]. Thank you for allowing us to participate in the care of Mery Schaefer. The note was completed using Dragon voice recognition transcription. Every effort was made to ensure accuracy; however, inadvertent transcription errors may be present despite my best efforts to edit errors. Matthew MARX    Attending physician addendum:      I have personally seen and examined the patient, reviewed the patient's medical record and pertinent labs and clinical imaging. I have personally staffed the case with ARASELI Oconnor. I agree with her consultation note, exam findings, assessment and plans  as written above. I have made appropriate modifications and edited her assessment and plan where needed to reflect my impression and plans for this patient. Patient has had no further bleeding from his ostomy. He is status post EGD and ileoscopy yesterday    /79   Pulse (!) 118   Temp 97.6 °F (36.4 °C) (Oral)   Resp 18   Ht 6' 1\" (1.854 m)   Wt 248 lb 0.3 oz (112.5 kg)   SpO2 95%   BMI 32.72 kg/m²      Gen- pallor  CV- tachy  Obese- LLQ ileostomy noted. No bleeding noted  Abd- obese, NT/ND    Impression:  1) Acute blood loss anemia with hemorrhagic shock-resolved. From peristomal ulceration.   No ileal or upper GI pathology to explain the bleeding. 2) Cirrhosis-  Based on prior imaging patient has suspected portal HTN and splenomegaly. No obvious ascites on exam.  Patient with chronic HBV on Tenofovir. He reports diagnosis of MAGALLON. No recent EGD. No prior history of variceal hemorrhage, encephalopathy, ascites, or SBP. 3) HIV/Hep B coinfection     4) DM     5) Leukocytosis- suspect hemoconcentration > sepsis     6) Hx of HTN- meds on hold     7) Hyperlipidemia  8) NAVID  9) Hx of anal cancer s/p APR           Recommendations:   1)Diet as tolerated  2) Appreciate ostomy nurse input  (Coloplast SenSura Labolt flat 1 pc #[id number]  Brava protective Seal thin [id number])  3) General surgery may need to consider intervention if patient rebleeds. ? Silver nitrate vs oversewing of vessel feeding this area. 4) Outpatient follow up with Dr. Yovany Fontenot at [facility] and his ID doc at [address] [place] [address] Drive sign off. Call with ? Thank you for allowing me to participate in the care of your patient. Please feel free to contact me with any questions or concerns. Thank you for allowing me to participate in this patient's care. If there are any questions or concerns regarding this patient, or the plan we have set in place, please feel free to contact me at [phone].      Aubrey Boyce, DO

## 2022-11-18 NOTE — PLAN OF CARE
Problem: Discharge Planning  Goal: Discharge to home or other facility with appropriate resources  Outcome: Progressing  Flowsheets (Taken 11/17/2022 2000)  Discharge to home or other facility with appropriate resources:   Identify barriers to discharge with patient and caregiver   Arrange for needed discharge resources and transportation as appropriate   Identify discharge learning needs (meds, wound care, etc)   Refer to discharge planning if patient needs post-hospital services based on physician order or complex needs related to functional status, cognitive ability or social support system     Problem: Safety - Adult  Goal: Free from fall injury  Outcome: Progressing

## 2022-11-18 NOTE — PLAN OF CARE
Problem: Discharge Planning  Goal: Discharge to home or other facility with appropriate resources  11/18/2022 1622 by Renate Brandon RN  Outcome: Progressing  Flowsheets (Taken 11/18/2022 1622)  Discharge to home or other facility with appropriate resources:   Identify barriers to discharge with patient and caregiver   Identify discharge learning needs (meds, wound care, etc)   Refer to discharge planning if patient needs post-hospital services based on physician order or complex needs related to functional status, cognitive ability or social support system   Arrange for needed discharge resources and transportation as appropriate  11/18/2022 0606 by Bertha Morris RN  Outcome: Progressing  Flowsheets (Taken 11/17/2022 2000)  Discharge to home or other facility with appropriate resources:   Identify barriers to discharge with patient and caregiver   Arrange for needed discharge resources and transportation as appropriate   Identify discharge learning needs (meds, wound care, etc)   Refer to discharge planning if patient needs post-hospital services based on physician order or complex needs related to functional status, cognitive ability or social support system     Problem: Safety - Adult  Goal: Free from fall injury  11/18/2022 1622 by Renate Brandon RN  Outcome: Progressing  Flowsheets (Taken 11/18/2022 1622)  Free From Fall Injury: Instruct family/caregiver on patient safety  11/18/2022 0606 by Bertha Morris RN  Outcome: Progressing     Problem: Pain  Goal: Verbalizes/displays adequate comfort level or baseline comfort level  Outcome: Progressing  Flowsheets (Taken 11/18/2022 1622)  Verbalizes/displays adequate comfort level or baseline comfort level:   Encourage patient to monitor pain and request assistance   Consider cultural and social influences on pain and pain management   Administer analgesics based on type and severity of pain and evaluate response   Assess pain using appropriate pain scale Implement non-pharmacological measures as appropriate and evaluate response

## 2022-11-19 LAB
A/G RATIO: 0.9 (ref 1.1–2.2)
ALBUMIN SERPL-MCNC: 2.4 G/DL (ref 3.4–5)
ALP BLD-CCNC: 69 U/L (ref 40–129)
ALT SERPL-CCNC: 830 U/L (ref 10–40)
ANION GAP SERPL CALCULATED.3IONS-SCNC: 12 MMOL/L (ref 3–16)
AST SERPL-CCNC: 803 U/L (ref 15–37)
BASOPHILS ABSOLUTE: 0 K/UL (ref 0–0.2)
BASOPHILS RELATIVE PERCENT: 0.3 %
BILIRUB SERPL-MCNC: 0.7 MG/DL (ref 0–1)
BUN BLDV-MCNC: 19 MG/DL (ref 7–20)
CALCIUM SERPL-MCNC: 7.8 MG/DL (ref 8.3–10.6)
CHLORIDE BLD-SCNC: 99 MMOL/L (ref 99–110)
CO2: 21 MMOL/L (ref 21–32)
CREAT SERPL-MCNC: 0.9 MG/DL (ref 0.9–1.3)
EOSINOPHILS ABSOLUTE: 0.1 K/UL (ref 0–0.6)
EOSINOPHILS RELATIVE PERCENT: 2.4 %
GFR SERPL CREATININE-BSD FRML MDRD: >60 ML/MIN/{1.73_M2}
GLUCOSE BLD-MCNC: 166 MG/DL (ref 70–99)
GLUCOSE BLD-MCNC: 182 MG/DL (ref 70–99)
GLUCOSE BLD-MCNC: 186 MG/DL (ref 70–99)
GLUCOSE BLD-MCNC: 232 MG/DL (ref 70–99)
GLUCOSE BLD-MCNC: 255 MG/DL (ref 70–99)
HBV QNT LOG, IU/ML: 2.24 LOG IU/ML
HBV QNT, IU/ML: 174 IU/ML
HCT VFR BLD CALC: 25.6 % (ref 40.5–52.5)
HEMOGLOBIN: 8.6 G/DL (ref 13.5–17.5)
INTERPRETATION: DETECTED
LYMPHOCYTES ABSOLUTE: 1 K/UL (ref 1–5.1)
LYMPHOCYTES RELATIVE PERCENT: 17.3 %
MAGNESIUM: 1.7 MG/DL (ref 1.8–2.4)
MCH RBC QN AUTO: 30.2 PG (ref 26–34)
MCHC RBC AUTO-ENTMCNC: 33.5 G/DL (ref 31–36)
MCV RBC AUTO: 90 FL (ref 80–100)
MONOCYTES ABSOLUTE: 0.6 K/UL (ref 0–1.3)
MONOCYTES RELATIVE PERCENT: 10.8 %
NEUTROPHILS ABSOLUTE: 4.1 K/UL (ref 1.7–7.7)
NEUTROPHILS RELATIVE PERCENT: 69.2 %
OCCULT BLOOD SCREENING: NORMAL
OSMOLALITY URINE: 342 MOSM/KG (ref 390–1070)
PDW BLD-RTO: 15.1 % (ref 12.4–15.4)
PERFORMED ON: ABNORMAL
PLATELET # BLD: 75 K/UL (ref 135–450)
PMV BLD AUTO: 10.6 FL (ref 5–10.5)
POTASSIUM REFLEX MAGNESIUM: 3.5 MMOL/L (ref 3.5–5.1)
PROCALCITONIN: 1.77 NG/ML (ref 0–0.15)
RBC # BLD: 2.85 M/UL (ref 4.2–5.9)
REASON FOR REJECTION: NORMAL
REJECTED TEST: NORMAL
SODIUM BLD-SCNC: 132 MMOL/L (ref 136–145)
SODIUM URINE: <20 MMOL/L
TOTAL PROTEIN: 5 G/DL (ref 6.4–8.2)
WBC # BLD: 5.9 K/UL (ref 4–11)

## 2022-11-19 PROCEDURE — 84145 PROCALCITONIN (PCT): CPT

## 2022-11-19 PROCEDURE — 80053 COMPREHEN METABOLIC PANEL: CPT

## 2022-11-19 PROCEDURE — 6370000000 HC RX 637 (ALT 250 FOR IP): Performed by: INTERNAL MEDICINE

## 2022-11-19 PROCEDURE — 1200000000 HC SEMI PRIVATE

## 2022-11-19 PROCEDURE — 83935 ASSAY OF URINE OSMOLALITY: CPT

## 2022-11-19 PROCEDURE — 6360000002 HC RX W HCPCS: Performed by: INTERNAL MEDICINE

## 2022-11-19 PROCEDURE — 83735 ASSAY OF MAGNESIUM: CPT

## 2022-11-19 PROCEDURE — 2700000000 HC OXYGEN THERAPY PER DAY

## 2022-11-19 PROCEDURE — 84300 ASSAY OF URINE SODIUM: CPT

## 2022-11-19 PROCEDURE — 94761 N-INVAS EAR/PLS OXIMETRY MLT: CPT

## 2022-11-19 PROCEDURE — 6370000000 HC RX 637 (ALT 250 FOR IP): Performed by: HOSPITALIST

## 2022-11-19 PROCEDURE — 6370000000 HC RX 637 (ALT 250 FOR IP): Performed by: NURSE PRACTITIONER

## 2022-11-19 PROCEDURE — C9113 INJ PANTOPRAZOLE SODIUM, VIA: HCPCS | Performed by: INTERNAL MEDICINE

## 2022-11-19 PROCEDURE — 6370000000 HC RX 637 (ALT 250 FOR IP): Performed by: STUDENT IN AN ORGANIZED HEALTH CARE EDUCATION/TRAINING PROGRAM

## 2022-11-19 PROCEDURE — 2580000003 HC RX 258

## 2022-11-19 PROCEDURE — 94640 AIRWAY INHALATION TREATMENT: CPT

## 2022-11-19 PROCEDURE — 6360000002 HC RX W HCPCS

## 2022-11-19 PROCEDURE — 2580000003 HC RX 258: Performed by: INTERNAL MEDICINE

## 2022-11-19 PROCEDURE — 85025 COMPLETE CBC W/AUTO DIFF WBC: CPT

## 2022-11-19 PROCEDURE — 82270 OCCULT BLOOD FECES: CPT

## 2022-11-19 PROCEDURE — 99233 SBSQ HOSP IP/OBS HIGH 50: CPT | Performed by: INTERNAL MEDICINE

## 2022-11-19 RX ORDER — NICOTINE 21 MG/24HR
1 PATCH, TRANSDERMAL 24 HOURS TRANSDERMAL DAILY
Status: DISCONTINUED | OUTPATIENT
Start: 2022-11-19 | End: 2022-11-23 | Stop reason: HOSPADM

## 2022-11-19 RX ADMIN — RALTEGRAVIR 400 MG: 400 TABLET, FILM COATED ORAL at 22:01

## 2022-11-19 RX ADMIN — CLONAZEPAM 2 MG: 1 TABLET ORAL at 21:10

## 2022-11-19 RX ADMIN — SODIUM CHLORIDE, PRESERVATIVE FREE 10 ML: 5 INJECTION INTRAVENOUS at 21:18

## 2022-11-19 RX ADMIN — IPRATROPIUM BROMIDE AND ALBUTEROL 1 PUFF: 20; 100 SPRAY, METERED RESPIRATORY (INHALATION) at 08:34

## 2022-11-19 RX ADMIN — OXYCODONE 5 MG: 5 TABLET ORAL at 10:52

## 2022-11-19 RX ADMIN — IPRATROPIUM BROMIDE AND ALBUTEROL 1 PUFF: 20; 100 SPRAY, METERED RESPIRATORY (INHALATION) at 13:00

## 2022-11-19 RX ADMIN — Medication 2 PUFF: at 19:50

## 2022-11-19 RX ADMIN — OXYCODONE 5 MG: 5 TABLET ORAL at 21:10

## 2022-11-19 RX ADMIN — Medication 2 PUFF: at 08:34

## 2022-11-19 RX ADMIN — PANTOPRAZOLE SODIUM 40 MG: 40 INJECTION, POWDER, FOR SOLUTION INTRAVENOUS at 10:39

## 2022-11-19 RX ADMIN — TENOFOVIR ALAFENAMIDE 25 MG: 25 TABLET ORAL at 10:40

## 2022-11-19 RX ADMIN — SODIUM CHLORIDE, PRESERVATIVE FREE 10 ML: 5 INJECTION INTRAVENOUS at 10:42

## 2022-11-19 RX ADMIN — EMTRICITABINE 200 MG: 200 CAPSULE ORAL at 10:40

## 2022-11-19 RX ADMIN — QUETIAPINE FUMARATE 800 MG: 150 TABLET, EXTENDED RELEASE ORAL at 21:10

## 2022-11-19 RX ADMIN — INSULIN LISPRO 4 UNITS: 100 INJECTION, SOLUTION INTRAVENOUS; SUBCUTANEOUS at 12:45

## 2022-11-19 RX ADMIN — ACETAMINOPHEN 650 MG: 325 TABLET ORAL at 21:10

## 2022-11-19 RX ADMIN — INSULIN LISPRO 2 UNITS: 100 INJECTION, SOLUTION INTRAVENOUS; SUBCUTANEOUS at 16:59

## 2022-11-19 RX ADMIN — AZITHROMYCIN DIHYDRATE 250 MG: 500 INJECTION, POWDER, LYOPHILIZED, FOR SOLUTION INTRAVENOUS at 10:56

## 2022-11-19 RX ADMIN — CEFTRIAXONE 2000 MG: 2 INJECTION, POWDER, FOR SOLUTION INTRAMUSCULAR; INTRAVENOUS at 16:59

## 2022-11-19 RX ADMIN — RALTEGRAVIR 400 MG: 400 TABLET, FILM COATED ORAL at 10:40

## 2022-11-19 RX ADMIN — CLONAZEPAM 2 MG: 1 TABLET ORAL at 10:39

## 2022-11-19 RX ADMIN — IPRATROPIUM BROMIDE AND ALBUTEROL 1 PUFF: 20; 100 SPRAY, METERED RESPIRATORY (INHALATION) at 19:50

## 2022-11-19 ASSESSMENT — PAIN DESCRIPTION - DESCRIPTORS
DESCRIPTORS: SHARP
DESCRIPTORS: SHARP

## 2022-11-19 ASSESSMENT — PAIN DESCRIPTION - FREQUENCY
FREQUENCY: INTERMITTENT
FREQUENCY: INTERMITTENT

## 2022-11-19 ASSESSMENT — PAIN DESCRIPTION - LOCATION
LOCATION: ABDOMEN
LOCATION: ABDOMEN;EAR

## 2022-11-19 ASSESSMENT — PAIN DESCRIPTION - ORIENTATION
ORIENTATION: LEFT
ORIENTATION: LEFT

## 2022-11-19 ASSESSMENT — PAIN SCALES - GENERAL
PAINLEVEL_OUTOF10: 7
PAINLEVEL_OUTOF10: 7
PAINLEVEL_OUTOF10: 0

## 2022-11-19 ASSESSMENT — PAIN DESCRIPTION - PAIN TYPE
TYPE: ACUTE PAIN
TYPE: ACUTE PAIN

## 2022-11-19 ASSESSMENT — PAIN DESCRIPTION - ONSET
ONSET: ON-GOING
ONSET: PROGRESSIVE

## 2022-11-19 ASSESSMENT — PAIN SCALES - WONG BAKER: WONGBAKER_NUMERICALRESPONSE: 0

## 2022-11-19 NOTE — PLAN OF CARE
Problem: Discharge Planning  Goal: Discharge to home or other facility with appropriate resources  11/19/2022 1815 by Sejal Cisneros RN  Outcome: Progressing  Flowsheets (Taken 11/19/2022 1815)  Discharge to home or other facility with appropriate resources:   Identify barriers to discharge with patient and caregiver   Arrange for needed discharge resources and transportation as appropriate   Identify discharge learning needs (meds, wound care, etc)  11/19/2022 0852 by Buddy Gutierrez RN  Outcome: Progressing  Flowsheets (Taken 11/19/2022 3167)  Discharge to home or other facility with appropriate resources:   Identify barriers to discharge with patient and caregiver   Identify discharge learning needs (meds, wound care, etc)     Problem: Safety - Adult  Goal: Free from fall injury  11/19/2022 1815 by Sejal Cisneros RN  Outcome: Progressing  Flowsheets (Taken 11/19/2022 1815)  Free From Fall Injury: Instruct family/caregiver on patient safety  11/19/2022 0852 by Buddy Gutierrez RN  Outcome: Progressing  Flowsheets (Taken 11/19/2022 0852)  Free From Fall Injury: Instruct family/caregiver on patient safety     Problem: Pain  Goal: Verbalizes/displays adequate comfort level or baseline comfort level  11/19/2022 1815 by Sejal Cisneros RN  Outcome: Progressing  Flowsheets (Taken 11/19/2022 1815)  Verbalizes/displays adequate comfort level or baseline comfort level:   Encourage patient to monitor pain and request assistance   Administer analgesics based on type and severity of pain and evaluate response   Consider cultural and social influences on pain and pain management   Assess pain using appropriate pain scale   Implement non-pharmacological measures as appropriate and evaluate response   Notify Licensed Independent Practitioner if interventions unsuccessful or patient reports new pain  11/19/2022 0852 by Buddy Gutierrez RN  Outcome: Progressing  Flowsheets (Taken 11/19/2022 0970)  Verbalizes/displays adequate comfort level or baseline comfort level:   Encourage patient to monitor pain and request assistance   Assess pain using appropriate pain scale   Administer analgesics based on type and severity of pain and evaluate response

## 2022-11-19 NOTE — PROGRESS NOTES
Blood drawn via TLC per protocol and sent to lab. Urine and stool obtained and sent to lab for testing.

## 2022-11-19 NOTE — PROGRESS NOTES
Hospitalist Progress Note      PCP: Ana Rosa Dumont. Carito Meza MD, MD    Date of Admission: 11/16/2022    Chief Complaint: bleeding from colostomy    Hospital Course:  65-year-old male with past medical history of HIV, hepatitis B cirrhosis, had a wide perineal resection and APR with diverting colostomy due to a HPV related squamous cell anal cancer, diabetes, chronic hepatitis B, HIV, fatty liver disease with cirrhosis followed by Dr. Jimena Tejeda at [facility]. His HIV is well controlled on antiretroviral therapy. on tenofovir for his hepatitis B. He has followed with infectious disease and has had an undetectable viral load and a good CD4 count. The patient has had a diverting colostomy since 2001. He has been followed by Dr. Amrik Stokes. Hypertension. presents to the hospital for reported seeing pulsatile maroon blood from colostomy. According to patient he has been bleeding for past 48 hours. He mentions he feels dizzy lightheaded he mentions his bleeding has been slowing down in the past 48 hours. Denied fevers chills diarrhea. Seen by GI had EGD w bx and ileoscopy 11/17/22. There was some ulceration at os stoma. EGD w bx and ileoscopy  Postoperative Diagnosis:   1) The esophagus was normal without evidence of esophagitis, Sainz's esophagus, strictures, rings, furrowing, masses, or nodules. No varices were noted. GE junction was at 41 cm from the incisors. 2) No gastric varices were noted. There was moderate portal gastropathy noted. 3) Mild erythema of the antrum. Biopsies were obtained from the antrum and body of the stomach to rule out active gastritis and H.pylori gastritis. 4) There were pre-pyloric ulcerations noted. No bleeding stigmata were noted. 5) THere were several clean based ulcers noted in the duodenal bulb and duodenal sweep. 6) The second portion of the duodenum had bile noted. No signs of bleeding were noted.   7) There was a reactive appearing nodule with ulceration noted at the os of the ileostomy. There was no active bleeding. 8) Ileal mucosa was normal.  Brown stool was noted    Findings:     1) The esophagus was normal without evidence of esophagitis, Sainz's esophagus, strictures, rings, furrowing, masses, or nodules. No varices were noted. GE junction was at 41 cm from the incisors. 2) No gastric varices were noted. There was moderate portal gastropathy noted. 3) Mild erythema of the antrum. Biopsies were obtained from the antrum and body of the stomach to rule out active gastritis and H.pylori gastritis. 4) There were pre-pyloric ulcerations noted. No bleeding stigmata were noted. 5) THere were several clean based ulcers noted in the duodenal bulb and duodenal sweep. 6) The second portion of the duodenum had bile noted. No signs of bleeding were noted. The scope was then withdrawn back into the stomach, it was decompressed, and the scope was completely withdrawn. Then, an upper endoscope was introduced into the ileoscopy site. There was a reactive appearing nodule with ulceration noted at the os of the ileostomy. There was no active bleeding. Ileal mucosa was normal.  Brown stool was noted.  No further bleeding downgraded from ICU to floor 11/18/22    Subjective: up in recliner still feels weak on o2 NC has mild wheezing throughout, tolerating po this am stating this is first time eating some solid food, still has TLC left IJ (poor veins hx drug abuse in past ), mildly tachy 103 low grade temp 99.7 yesterday , 3L NC sats 90-91 overnight 94 % this am all other systems neg       Medications:  Reviewed    Infusion Medications    sodium chloride      sodium chloride 25 mL/hr at 11/18/22 1818    dextrose       Scheduled Medications    raltegravir  400 mg Oral BID    emtricitabine  200 mg Oral Daily    And    Tenofovir Alafenamide Fumarate  25 mg Oral Daily    mometasone-formoterol  2 puff Inhalation BID    azithromycin  250 mg IntraVENous Q24H    sodium chloride flush 10 mL IntraVENous 2 times per day    pantoprazole  40 mg IntraVENous Daily    cefTRIAXone (ROCEPHIN) IV  2,000 mg IntraVENous Q24H    clonazePAM  2 mg Oral BID    QUEtiapine  800 mg Oral Nightly    insulin lispro  0-8 Units SubCUTAneous TID WC    insulin lispro  0-4 Units SubCUTAneous Nightly    albuterol-ipratropium  1 puff Inhalation TID     PRN Meds: oxyCODONE, sodium chloride, sodium chloride flush, sodium chloride, promethazine **OR** ondansetron, polyethylene glycol, acetaminophen **OR** acetaminophen, albuterol, albuterol sulfate HFA, glucose, dextrose bolus **OR** dextrose bolus, glucagon (rDNA), dextrose      Intake/Output Summary (Last 24 hours) at 11/19/2022 1337  Last data filed at 11/19/2022 1237  Gross per 24 hour   Intake 1000 ml   Output 1475 ml   Net -475 ml       Physical Exam Performed:    /69   Pulse (!) 103   Temp 98.3 °F (36.8 °C) (Axillary)   Resp 18   Ht 6' 1\" (1.854 m)   Wt 264 lb 8.8 oz (120 kg)   SpO2 94%   BMI 34.90 kg/m²   Gen: No distress. obese up in recliner  ENT: mucosa moist goatee  EYES: PERRL EOMI anicteric  Resp: O2 3L NC, mild wheezing throughout   CV: RRR No murmur or rub. Neck left IJ TLC CDI  Extremity No edema jaundice no pallor    Abdomen obese NT ND soft   M/S: alert Oriented x 3   fsuprapubic         Labs:   Recent Labs     11/17/22 0458 11/17/22  1204 11/17/22  2305 11/18/22  0445 11/19/22  0805   WBC 9.7  --   --  5.9 5.9   HGB 9.9*  9.8*   < > 8.7* 8.9* 8.6*   HCT 28.8*  28.7*   < > 25.8* 26.4* 25.6*   PLT 70*  --   --  66* 75*    < > = values in this interval not displayed.      Recent Labs     11/17/22  0458 11/18/22  0445 11/19/22  0450   * 125* 132*   K 3.9  3.9 3.6 3.5   CL 94* 95* 99   CO2 20* 23 21   BUN 36* 22* 19   CREATININE 1.7* 1.1 0.9   CALCIUM 7.2* 7.4* 7.8*     Recent Labs     11/17/22  0458 11/18/22  0445 11/19/22  0450   AST 2,172* 1,175* 803*   ALT 1,223* 901* 830*   BILIDIR  --  0.5*  --    BILITOT 1.3* 0.7 0.7   ALKPHOS 62 67 69     No results for input(s): INR in the last 72 hours. No results for input(s): Nixon Bigness in the last 72 hours. Urinalysis:    No results found for: Clarise Leaver, BACTERIA, RBCUA, BLOODU, [address] [place] [address]    Radiology:  CT CHEST WO CONTRAST   Final Result   1. Patchy confluent airspace opacities in the left upper lobe which likely   reflect pneumonia. 2. Bullous emphysema. 3. Left internal jugular venous catheter extending to the central aspect of   the left brachiocephalic vein and right subclavian Port-A-Cath with a loop at   the central aspect of the subclavian vein. 4. Small pericardial effusion. 5. Hepatic cirrhosis and at least mild splenomegaly. XR CHEST PORTABLE   Final Result   Bilateral central bronchial thickening with left perihilar pneumonia. Left IJ central venous catheter terminates in the central left   brachiocephalic vein. A right-sided chest port is identified, with the mid aspect of the catheter   seen coiled overlying the region of the central right subclavian   vein/innominate vein. US ABDOMEN COMPLETE   Final Result   Marked right hydronephrosis and marked right renal parenchymal atrophy.       RECOMMENDATIONS:   Unavailable             IP CONSULT TO GENERAL SURGERY  IP CONSULT TO GENERAL SURGERY  IP CONSULT TO UROLOGY  IP CONSULT TO GI  IP CONSULT TO NEPHROLOGY  IP CONSULT TO CRITICAL CARE  IP CONSULT TO CRITICAL CARE    Assessment/Plan:    Active Hospital Problems    Diagnosis     Bleeding from colostomy stoma ([facility] [address] [K94.01]      Priority: Medium    Rectal bleeding [K62.5]      Priority: Medium     Cough  Pleuritic chest pain  Fever  LLL pneumonia  Sepsis due to pneumonia  -Temp 100.5 11/17/22 2000  Flu/covid neg  Zithro/rocephin   Tobacco dependence  Bullous emphyesema  Wheezing  Tachycardia  Acute hypoxic respiratory failure  --O2 3L NC  dulera  Combivent inhaler TID  Pulmonary following   --1ppd  --educated pt on lung findings on cxr with emphysema recommend quit        Acute blood loss Anemia  Hemorrhagic shock   --Hgb stable 8.9  Iron 46, TIBC 213 %sat 22    Portacath right chest wall  Cirrhosis liver  Chronic Hepatitis B  HIV  Previous IVDA cocaine and meth  --has TLC dc prior to dc   AFP tumor marker neg 1.1   fatty liver disease with cirrhosis followed by Dr. Sven Archer at [address] HIV meds emtriva raltegravir    Hx APR diverting colostomy   Hx HPV related squamous cell anal cancer    Downgraded to floor status 11/18  PT OT  Follow o2 demand to see if will need home o2     Hyponatremia  --na 132  Urine na <20, urine osmo 342    Obesity BMI 34.9      DVT Prophylaxis: scd  Diet: ADULT DIET; Regular; 4 carb choices (60 gm/meal)  Code Status: Full Code  PT/OT Eval Status: ordered    Dispo - home w wife ? Need HHC ?  Need home O2       Willie Paul MD

## 2022-11-19 NOTE — PROGRESS NOTES
Pulmonary Progress Note    Date of Admission: 11/16/2022   LOS: 3 days       CC:  Chief Complaint   Patient presents with    Rectal Bleeding     Pt has been bleeding from colostomy for 2 days, pt is pale and diaphoretic, tachy and hypotensive         Subjective:  Asleep. Improved exam.  Moved out of ICU yesterday. Assessment:     GERD  Hypertension  Hyperlipidemia  Bipolar  Substance abuse  History of avascular necrosis of the hip  History of rectal cancer  Hepatitis B  HIV  Bullous emphysema PI MM normal levels 2011 2018    Plan: This note may have been transcribed using [id number] [employer]. Please disregard any translational errors. Hospital Day: 3     Stomal bleeding  Surgery consulted and following. GI consulted  H&H pending for today       Cirrhosis/hepatitis   GI consulted. Improving      Hyponatremia  Sodium 137 October 10, 2022. Defer to nephrology. Improving     Abnormal radiograph  CT consistent with pneumonia  Procalcitonin improving nicely. Cultures negative  CTX and Azithromycin  HIV controlled. Emphysema  CT look similar to bullous emphysema. Versus cyst.  Alpha 1 antitrypsin deficiency negative in 2011 and 2018. Dulera  Wean oxygen  Combivent. No wheezing on today's exam              Data:        PHYSICAL EXAM:   Blood pressure 104/71, pulse 87, temperature 98.2 °F (36.8 °C), temperature source Axillary, resp. rate 16, height 6' 1\" (1.854 m), weight 264 lb 8.8 oz (120 kg), SpO2 94 %.'  Body mass index is 34.9 kg/m². Gen: No distress. Sleep  ENT:   Resp: No accessory muscle use. No crackles. No wheezes. No rhonchi. CV: Regular rate. Regular rhythm. No murmur or rub. No edema. Skin: Warm, dry, normal texture and turgor. No nodule on exposed extremities. M/S: No cyanosis. No clubbing. No joint deformity.   Psych: Asleep    Medications:    Scheduled Meds:   raltegravir  400 mg Oral BID    emtricitabine  200 mg Oral Daily    And    Tenofovir Alafenamide Fumarate  25 mg Oral Daily    mometasone-formoterol  2 puff Inhalation BID    azithromycin  250 mg IntraVENous Q24H    sodium chloride flush  10 mL IntraVENous 2 times per day    pantoprazole  40 mg IntraVENous Daily    cefTRIAXone (ROCEPHIN) IV  2,000 mg IntraVENous Q24H    clonazePAM  2 mg Oral BID    QUEtiapine  800 mg Oral Nightly    insulin lispro  0-8 Units SubCUTAneous TID WC    insulin lispro  0-4 Units SubCUTAneous Nightly    albuterol-ipratropium  1 puff Inhalation TID       Continuous Infusions:   sodium chloride      sodium chloride 25 mL/hr at 11/18/22 1818    dextrose         PRN Meds:  oxyCODONE, sodium chloride, sodium chloride flush, sodium chloride, promethazine **OR** ondansetron, polyethylene glycol, acetaminophen **OR** acetaminophen, albuterol, albuterol sulfate HFA, glucose, dextrose bolus **OR** dextrose bolus, glucagon (rDNA), dextrose    Labs reviewed:  CBC:   Recent Labs     11/16/22  1316 11/16/22  2324 11/17/22  0458 11/17/22  1204 11/17/22  1749 11/17/22  2305 11/18/22  0445   WBC 14.8*  --  9.7  --   --   --  5.9   HGB 10.7*   < > 9.9*  9.8*   < > 9.3* 8.7* 8.9*   HCT 32.5*   < > 28.8*  28.7*   < > 27.3* 25.8* 26.4*   MCV 90.1  --  90.5  --   --   --  89.3     --  70*  --   --   --  66*    < > = values in this interval not displayed. BMP:   Recent Labs     11/17/22  0458 11/18/22  0445 11/19/22  0450   * 125* 132*   K 3.9  3.9 3.6 3.5   CL 94* 95* 99   CO2 20* 23 21   BUN 36* 22* 19   CREATININE 1.7* 1.1 0.9       LIVER PROFILE:   Recent Labs     11/17/22  0458 11/18/22  0445 11/19/22  0450   AST 2,172* 1,175* 803*   ALT 1,223* 901* 830*   BILIDIR  --  0.5*  --    BILITOT 1.3* 0.7 0.7   ALKPHOS 62 67 69       PT/INR:   Recent Labs     11/16/22  1316   PROTIME 14.9*   INR 1.18*       APTT: No results for input(s): APTT in the last 72 hours.   UA:No results for input(s): NITRITE, COLORU, PHUR, LABCAST, WBCUA, RBCUA, MUCUS, TRICHOMONAS, YEAST, BACTERIA, CLARITYU, SPECGRAV, LEUKOCYTESUR, UROBILINOGEN, BILIRUBINUR, BLOODU, GLUCOSEU, AMORPHOUS in the last 72 hours. Invalid input(s): Judeth Plate  No results for input(s): PH, PCO2, PO2 in the last 72 hours. Cx:      Films: This note was transcribed using [id number] [employer]. Please disregard any translational errors.       Denita Rowe Pulmonary, Sleep and [facility] [phone]

## 2022-11-19 NOTE — PROGRESS NOTES
Patient resting in bed this evening. Patient reports intermittent L sided rib pain + intermittent sharp bilateral ear pain. PRNs administered this shift for pain management. See eMAR for documentation. Patient reports satisfaction w/ this intervention throughout the shift. Colostomy bag changed by patient this shift. Patient independent w/ colostomy care. Output not measured as patient dumped the contents while doing bag change. Patient denies physical/emotional needs at this time. Call light, telephone, and bed side table are within reach. Fall precautions in place. Will continue to monitor and assess.

## 2022-11-19 NOTE — PLAN OF CARE
Problem: Discharge Planning  Goal: Discharge to home or other facility with appropriate resources  Outcome: Progressing  Flowsheets (Taken 11/19/2022 4272)  Discharge to home or other facility with appropriate resources:   Identify barriers to discharge with patient and caregiver   Identify discharge learning needs (meds, wound care, etc)     Problem: Safety - Adult  Goal: Free from fall injury  Outcome: Progressing  Flowsheets (Taken 11/19/2022 0852)  Free From Fall Injury: Instruct family/caregiver on patient safety     Problem: Pain  Goal: Verbalizes/displays adequate comfort level or baseline comfort level  Outcome: Progressing  Flowsheets (Taken 11/19/2022 0852)  Verbalizes/displays adequate comfort level or baseline comfort level:   Encourage patient to monitor pain and request assistance   Assess pain using appropriate pain scale   Administer analgesics based on type and severity of pain and evaluate response     Problem: Discharge Planning  Goal: Discharge to home or other facility with appropriate resources  Outcome: Progressing  Flowsheets (Taken 11/19/2022 4901)  Discharge to home or other facility with appropriate resources:   Identify barriers to discharge with patient and caregiver   Identify discharge learning needs (meds, wound care, etc)     Problem: Safety - Adult  Goal: Free from fall injury  Outcome: Progressing  Flowsheets (Taken 11/19/2022 0852)  Free From Fall Injury: Instruct family/caregiver on patient safety     Problem: Pain  Goal: Verbalizes/displays adequate comfort level or baseline comfort level  Outcome: Progressing  Flowsheets (Taken 11/19/2022 0852)  Verbalizes/displays adequate comfort level or baseline comfort level:   Encourage patient to monitor pain and request assistance   Assess pain using appropriate pain scale   Administer analgesics based on type and severity of pain and evaluate response

## 2022-11-19 NOTE — PROGRESS NOTES
[facility]                                               Progress note    CC: bleeding colostomy stoma  Summary:   Clarke Lamas is being seen by nephrology for Acute kidney injury (DEISI). He is a 47 y.o. male with a PMH significant for HIV, HBV with cirrhosis, hypertension who presented to the ED 11/16/2022 with bleeding from his colostomy stoma. Bleeding had been ongoing for 2 days. He was feeling dizzy and lightheaded so he cam to the ED. HE was noted to have a hemoglobin of 10.7, but a Cr of 1.9, and he was severely hypotensive as low as 68/57. He had also been complaining of hematuria but harris placement was difficult so he had to undergo cystoscopic urethral dilation and harris placement. Interval History  Seen at bedside. He is very somnolent   Extremely wheezy and says he feels SOB. Not much edema at all. /71  96% on 3 L   UO 1.2 L     Labs Na 132  K 3.5   Bicarb 21  BUN 19 and Cr 0.9        Plan:   - hyponatremia improved after stopping fluids. - no need for diuretics, appears euvolemic.   - can check daily renal panel      Willow Almodovar MD  [facility]  Office: [phone]    Assessment:   DEISI:  - baseline Cr 1.0-1.2  - Cr at time of admission was 1.9, associated with bleeding from the stoma, hypotension  - also had urinary retention due to urethral stricture which was dilated cystoscopically by urology upon presentation to the ED due to difficulty placing a harris. - DEISI likely 2/2 hypotension, hemodynamic insult. Acute metabolic acidosis:  - 2/2 ARF and hypotension, improving     Chronic right sided hydronephrosis:  - likely has a solitary functioning left kidney  - Hydronephrosis was present on US in 2021 also.      Bleeding stoma:  - per GI and gen-surg.  - s/p EGD 11/17/2022, mild portal gastropathy, pre-pyloric ulcerations, clean based ulcers in the duodenal bulb, no signs of active bleeding.  - ileoscopy was also done showing reactive appearing nodule with ulceration at the os of the ostomy. ROS:     Positives Listed Bold. All other remaining systems are negative. Constitutional:  fever, chills, weakness, weight change, fatigue,      Skin:  rash, pruritus, hair loss, bruising, dry skin, petechiae. Head, Face, Neck   headaches, swelling,  cervical adenopathy. Respiratory: shortness of breath, cough, or wheezing  Cardiovascular: chest pain, palpitations, dizzy, edema  Gastrointestinal: nausea, vomiting, diarrhea, constipation,belly pain    Yellow skin, blood in stool  Musculoskeletal:  back pain, muscle weakness, gait problems,       joint pain or swelling. Genitourinary:  dysuria, poor urine flow, flank pain, blood in urine  Neurologic:  vertigo, TIA'S, syncope, seizures, focal weakness  Psychosocial:  insomnia, anxiety, or depression. Additional positive findings: -     PMH:   Past medical history, surgical history, social history, family history are reviewed and updated as appropriate. Reviewed current medication list.   Allergies reviewed and updated as needed. PE:   Vitals:    11/19/22 0707   BP: 104/71   Pulse: 87   Resp: 16   Temp: 98.2 °F (36.8 °C)   SpO2: 96%       General appearance: in NAD, lethargic. Comfortable. HEENT: EOM intact, no icterus. Trachea is midline. Neck : No masses, appears symmetrical, no JVD  Respiratory: Respiratory effort appears normal, bilateral equal chest rise, + wheezes diffusely   Cardiovascular: Ausculation shows RRR trace edema  Abdomen: No visible mass or tenderness, non distended. Musculoskeletal:  Joints with no swelling or deformity. Skin:no rashes, ulcers, induration, no jaundice. Neuro: face symmetric, no focal deficits. Appropriate responses.        Lab Results   Component Value Date    CREATININE 0.9 11/19/2022    BUN 19 11/19/2022     (L) 11/19/2022    K 3.5 11/19/2022    CL 99 11/19/2022    CO2 21 11/19/2022      Lab Results   Component Value Date    WBC 5.9 11/18/2022    HGB 8.9

## 2022-11-19 NOTE — PROGRESS NOTES
General Surgery    No bleeding noted   Ostomy pink, less irritated    Will sign off  Please call with questions    Electronically signed by Josy Enriquez MD on 11/19/2022 at 10:39 AM

## 2022-11-20 ENCOUNTER — APPOINTMENT (OUTPATIENT)
Dept: GENERAL RADIOLOGY | Age: 52
DRG: 871 | End: 2022-11-20
Payer: MEDICARE

## 2022-11-20 LAB
GLUCOSE BLD-MCNC: 175 MG/DL (ref 70–99)
GLUCOSE BLD-MCNC: 222 MG/DL (ref 70–99)
GLUCOSE BLD-MCNC: 260 MG/DL (ref 70–99)
GLUCOSE BLD-MCNC: 266 MG/DL (ref 70–99)
PERFORMED ON: ABNORMAL
PROCALCITONIN: 0.97 NG/ML (ref 0–0.15)

## 2022-11-20 PROCEDURE — 36592 COLLECT BLOOD FROM PICC: CPT

## 2022-11-20 PROCEDURE — 2580000003 HC RX 258: Performed by: INTERNAL MEDICINE

## 2022-11-20 PROCEDURE — C9113 INJ PANTOPRAZOLE SODIUM, VIA: HCPCS | Performed by: INTERNAL MEDICINE

## 2022-11-20 PROCEDURE — 6370000000 HC RX 637 (ALT 250 FOR IP): Performed by: NURSE PRACTITIONER

## 2022-11-20 PROCEDURE — 6360000002 HC RX W HCPCS: Performed by: INTERNAL MEDICINE

## 2022-11-20 PROCEDURE — 99233 SBSQ HOSP IP/OBS HIGH 50: CPT | Performed by: INTERNAL MEDICINE

## 2022-11-20 PROCEDURE — 2580000003 HC RX 258

## 2022-11-20 PROCEDURE — 6370000000 HC RX 637 (ALT 250 FOR IP): Performed by: INTERNAL MEDICINE

## 2022-11-20 PROCEDURE — 6360000002 HC RX W HCPCS

## 2022-11-20 PROCEDURE — 84145 PROCALCITONIN (PCT): CPT

## 2022-11-20 PROCEDURE — 51798 US URINE CAPACITY MEASURE: CPT

## 2022-11-20 PROCEDURE — 6370000000 HC RX 637 (ALT 250 FOR IP): Performed by: HOSPITALIST

## 2022-11-20 PROCEDURE — 1200000000 HC SEMI PRIVATE

## 2022-11-20 PROCEDURE — 94640 AIRWAY INHALATION TREATMENT: CPT

## 2022-11-20 PROCEDURE — 6370000000 HC RX 637 (ALT 250 FOR IP): Performed by: STUDENT IN AN ORGANIZED HEALTH CARE EDUCATION/TRAINING PROGRAM

## 2022-11-20 PROCEDURE — 2700000000 HC OXYGEN THERAPY PER DAY

## 2022-11-20 PROCEDURE — 71046 X-RAY EXAM CHEST 2 VIEWS: CPT

## 2022-11-20 RX ORDER — OXYCODONE HYDROCHLORIDE 5 MG/1
2.5 TABLET ORAL EVERY 8 HOURS PRN
Status: DISCONTINUED | OUTPATIENT
Start: 2022-11-20 | End: 2022-11-23 | Stop reason: HOSPADM

## 2022-11-20 RX ADMIN — CLONAZEPAM 2 MG: 1 TABLET ORAL at 20:07

## 2022-11-20 RX ADMIN — IPRATROPIUM BROMIDE AND ALBUTEROL 1 PUFF: 20; 100 SPRAY, METERED RESPIRATORY (INHALATION) at 20:12

## 2022-11-20 RX ADMIN — ACETAMINOPHEN 650 MG: 325 TABLET ORAL at 07:41

## 2022-11-20 RX ADMIN — INSULIN LISPRO 4 UNITS: 100 INJECTION, SOLUTION INTRAVENOUS; SUBCUTANEOUS at 14:14

## 2022-11-20 RX ADMIN — RALTEGRAVIR 400 MG: 400 TABLET, FILM COATED ORAL at 07:41

## 2022-11-20 RX ADMIN — RALTEGRAVIR 400 MG: 400 TABLET, FILM COATED ORAL at 20:07

## 2022-11-20 RX ADMIN — PANTOPRAZOLE SODIUM 40 MG: 40 INJECTION, POWDER, FOR SOLUTION INTRAVENOUS at 07:41

## 2022-11-20 RX ADMIN — IPRATROPIUM BROMIDE AND ALBUTEROL 1 PUFF: 20; 100 SPRAY, METERED RESPIRATORY (INHALATION) at 13:21

## 2022-11-20 RX ADMIN — IPRATROPIUM BROMIDE AND ALBUTEROL 1 PUFF: 20; 100 SPRAY, METERED RESPIRATORY (INHALATION) at 09:24

## 2022-11-20 RX ADMIN — EMTRICITABINE 200 MG: 200 CAPSULE ORAL at 07:49

## 2022-11-20 RX ADMIN — Medication 2 PUFF: at 09:24

## 2022-11-20 RX ADMIN — OXYCODONE 5 MG: 5 TABLET ORAL at 07:49

## 2022-11-20 RX ADMIN — SODIUM CHLORIDE, PRESERVATIVE FREE 10 ML: 5 INJECTION INTRAVENOUS at 22:11

## 2022-11-20 RX ADMIN — AZITHROMYCIN DIHYDRATE 250 MG: 500 INJECTION, POWDER, LYOPHILIZED, FOR SOLUTION INTRAVENOUS at 09:39

## 2022-11-20 RX ADMIN — Medication 2 PUFF: at 20:12

## 2022-11-20 RX ADMIN — CLONAZEPAM 2 MG: 1 TABLET ORAL at 07:41

## 2022-11-20 RX ADMIN — OXYCODONE 2.5 MG: 5 TABLET ORAL at 18:09

## 2022-11-20 RX ADMIN — QUETIAPINE FUMARATE 800 MG: 150 TABLET, EXTENDED RELEASE ORAL at 20:07

## 2022-11-20 RX ADMIN — INSULIN LISPRO 2 UNITS: 100 INJECTION, SOLUTION INTRAVENOUS; SUBCUTANEOUS at 18:02

## 2022-11-20 RX ADMIN — SODIUM CHLORIDE, PRESERVATIVE FREE 10 ML: 5 INJECTION INTRAVENOUS at 07:44

## 2022-11-20 RX ADMIN — CEFTRIAXONE 2000 MG: 2 INJECTION, POWDER, FOR SOLUTION INTRAMUSCULAR; INTRAVENOUS at 17:54

## 2022-11-20 RX ADMIN — TENOFOVIR ALAFENAMIDE 25 MG: 25 TABLET ORAL at 07:49

## 2022-11-20 ASSESSMENT — PAIN DESCRIPTION - ORIENTATION
ORIENTATION: LEFT
ORIENTATION: LEFT

## 2022-11-20 ASSESSMENT — PAIN SCALES - GENERAL
PAINLEVEL_OUTOF10: 3
PAINLEVEL_OUTOF10: 3

## 2022-11-20 ASSESSMENT — PAIN DESCRIPTION - DESCRIPTORS
DESCRIPTORS: ACHING
DESCRIPTORS: ACHING

## 2022-11-20 ASSESSMENT — PAIN DESCRIPTION - LOCATION
LOCATION: CHEST
LOCATION: CHEST;EAR

## 2022-11-20 NOTE — PROGRESS NOTES
Pt. Nasal cannula found off,pt. Placed back on 5 L NC SpO2 96%, RN will get pulse ox. Continuous on.

## 2022-11-20 NOTE — PROGRESS NOTES
[facility]                                               Progress note    CC: bleeding colostomy stoma  Summary:   Arely Quintero is being seen by nephrology for Acute kidney injury (DEISI). He is a 47 y.o. male with a PMH significant for HIV, HBV with cirrhosis, hypertension who presented to the ED 11/16/2022 with bleeding from his colostomy stoma. Bleeding had been ongoing for 2 days. He was feeling dizzy and lightheaded so he cam to the ED. HE was noted to have a hemoglobin of 10.7, but a Cr of 1.9, and he was severely hypotensive as low as 68/57. He had also been complaining of hematuria but harris placement was difficult so he had to undergo cystoscopic urethral dilation and harris placement. Interval History  Patient was seen and examined at bedside  He feels his breathing is little bit better today  He has moving air better  He has trace lower extremity edema  Has some abdominal pain, pain in his ears    Last labs showed a sodium of 132 potassium 3.5 bicarb 21 creatinine 0.9 magnesium 1.7  Awaiting new labs from today        Plan:   -Awaiting new labs today  -Does not appear need diuretics today  blood pressure acceptable 126/85  Medication list reviewed      Levi Rod MD  [facility]  Office: [phone]    Assessment:   DEISI:  - baseline Cr 1.0-1.2  - Cr at time of admission was 1.9, associated with bleeding from the stoma, hypotension  - also had urinary retention due to urethral stricture which was dilated cystoscopically by urology upon presentation to the ED due to difficulty placing a harris. - DEISI likely 2/2 hypotension, hemodynamic insult. Acute metabolic acidosis:  - 2/2 ARF and hypotension, improving     Chronic right sided hydronephrosis:  - likely has a solitary functioning left kidney  - Hydronephrosis was present on US in 2021 also.      Bleeding stoma:  - per GI and gen-surg.  - s/p EGD 11/17/2022, mild portal gastropathy, pre-pyloric ulcerations, clean based ulcers in the duodenal bulb, no signs of active bleeding.  - ileoscopy was also done showing reactive appearing nodule with ulceration at the os of the ostomy. ROS:     Positives Listed Bold. All other remaining systems are negative. Constitutional:  fever, chills, weakness, weight change, fatigue,      Skin:  rash, pruritus, hair loss, bruising, dry skin, petechiae. Head, Face, Neck   headaches, swelling,  cervical adenopathy. Respiratory: shortness of breath, cough, or wheezing  Cardiovascular: chest pain, palpitations, dizzy, edema  Gastrointestinal: nausea, vomiting, diarrhea, constipation,belly pain    Yellow skin, blood in stool  Musculoskeletal:  back pain, muscle weakness, gait problems,       joint pain or swelling. Genitourinary:  dysuria, poor urine flow, flank pain, blood in urine  Neurologic:  vertigo, TIA'S, syncope, seizures, focal weakness  Psychosocial:  insomnia, anxiety, or depression. Additional positive findings: -     PMH:   Past medical history, surgical history, social history, family history are reviewed and updated as appropriate. Reviewed current medication list.   Allergies reviewed and updated as needed. PE:   Vitals:    11/20/22 0736   BP: 126/85   Pulse: 83   Resp: 16   Temp:    SpO2: 99%       General appearance: in NAD, lethargic. Comfortable. HEENT: EOM intact, no icterus. Trachea is midline. Neck : No masses, appears symmetrical, no JVD  Respiratory: Respiratory effort appears normal, bilateral equal chest rise, + wheezes diffusely   Cardiovascular: Ausculation shows RRR trace edema  Abdomen: No visible mass or tenderness, non distended. Musculoskeletal:  Joints with no swelling or deformity. Skin:no rashes, ulcers, induration, no jaundice. Neuro: face symmetric, no focal deficits. Appropriate responses.        Lab Results   Component Value Date    CREATININE 0.9 11/19/2022    BUN 19 11/19/2022     (L) 11/19/2022    K 3.5 11/19/2022    CL 99 11/19/2022    CO2 21 11/19/2022      Lab Results   Component Value Date    WBC 5.9 11/19/2022    HGB 8.6 (L) 11/19/2022    HCT 25.6 (L) 11/19/2022    MCV 90.0 11/19/2022    PLT 75 (L) 11/19/2022     Lab Results   Component Value Date    CALCIUM 7.8 (L) 11/19/2022    PHOS 2.2 06/03/2021

## 2022-11-20 NOTE — PLAN OF CARE
Problem: Discharge Planning  Goal: Discharge to home or other facility with appropriate resources  11/20/2022 0958 by Leeann Hashimoto, RN  Outcome: Progressing     Problem: Safety - Adult  Goal: Free from fall injury  11/20/2022 0958 by Leeann Hashimoto, RN  Outcome: Progressing     Problem: Pain  Goal: Verbalizes/displays adequate comfort level or baseline comfort level  11/20/2022 0958 by Leeann Hashimoto, RN  Outcome: Progressing     Problem: Skin/Tissue Integrity  Goal: Absence of new skin breakdown  Description: 1. Monitor for areas of redness and/or skin breakdown  2. Assess vascular access sites hourly  3. Every 4-6 hours minimum:  Change oxygen saturation probe site  4. Every 4-6 hours:  If on nasal continuous positive airway pressure, respiratory therapy assess nares and determine need for appliance change or resting period.   11/20/2022 0958 by Leeann Hashimoto, RN  Outcome: Progressing

## 2022-11-20 NOTE — PLAN OF CARE
Problem: Discharge Planning  Goal: Discharge to home or other facility with appropriate resources  11/20/2022 0054 by Manuel Mercer RN  Outcome: Progressing  Flowsheets (Taken 11/19/2022 1815 by Ruby Lindsey RN)  Discharge to home or other facility with appropriate resources:   Identify barriers to discharge with patient and caregiver   Arrange for needed discharge resources and transportation as appropriate   Identify discharge learning needs (meds, wound care, etc)  11/19/2022 1815 by Ruby Lindsey RN  Outcome: Progressing  Flowsheets (Taken 11/19/2022 1815)  Discharge to home or other facility with appropriate resources:   Identify barriers to discharge with patient and caregiver   Arrange for needed discharge resources and transportation as appropriate   Identify discharge learning needs (meds, wound care, etc)     Problem: Safety - Adult  Goal: Free from fall injury  11/20/2022 0054 by Manuel Mecrer RN  Outcome: Progressing  Flowsheets  Taken 11/20/2022 0054  Free From Fall Injury:   Based on caregiver fall risk screen, instruct family/caregiver to ask for assistance with transferring infant if caregiver noted to have fall risk factors   Instruct family/caregiver on patient safety  Taken 11/20/2022 0052  Free From Fall Injury:   Instruct family/caregiver on patient safety   Based on caregiver fall risk screen, instruct family/caregiver to ask for assistance with transferring infant if caregiver noted to have fall risk factors  11/19/2022 1815 by Ruby Lindsey RN  Outcome: Progressing  [address] (Taken 11/19/2022 1815)  Free From Fall Injury: Instruct family/caregiver on patient safety     Problem: Pain  Goal: Verbalizes/displays adequate comfort level or baseline comfort level  11/20/2022 0054 by Manuel Mercer RN  Outcome: Progressing  Flowsheets (Taken 11/19/2022 1815 by Ruby Lindsey RN)  Verbalizes/displays adequate comfort level or baseline comfort level:   Encourage patient to monitor pain and request assistance   Administer analgesics based on type and severity of pain and evaluate response   Consider cultural and social influences on pain and pain management   Assess pain using appropriate pain scale   Implement non-pharmacological measures as appropriate and evaluate response   Notify Licensed Independent Practitioner if interventions unsuccessful or patient reports new pain  11/19/2022 1815 by Jarod Noe RN  Outcome: Progressing  Flowsheets (Taken 11/19/2022 1815)  Verbalizes/displays adequate comfort level or baseline comfort level:   Encourage patient to monitor pain and request assistance   Administer analgesics based on type and severity of pain and evaluate response   Consider cultural and social influences on pain and pain management   Assess pain using appropriate pain scale   Implement non-pharmacological measures as appropriate and evaluate response   Notify Licensed Independent Practitioner if interventions unsuccessful or patient reports new pain     Problem: Skin/Tissue Integrity  Goal: Absence of new skin breakdown  Description: 1. Monitor for areas of redness and/or skin breakdown  2. Assess vascular access sites hourly  3. Every 4-6 hours minimum:  Change oxygen saturation probe site  4. Every 4-6 hours:  If on nasal continuous positive airway pressure, respiratory therapy assess nares and determine need for appliance change or resting period. Outcome: Progressing  Note: Matthew score assessed. Patient able to ambulate and turn self. Repositioned patient Q2H and assessed skin. Educated patient on importance of repositioning to prevent skin issues.

## 2022-11-20 NOTE — PROGRESS NOTES
Pulmonary Progress Note    Date of Admission: 11/16/2022   LOS: 4 days       CC:  Chief Complaint   Patient presents with    Rectal Bleeding     Pt has been bleeding from colostomy for 2 days, pt is pale and diaphoretic, tachy and hypotensive         Subjective:  Asleep. Assessment:     GERD  Hypertension  Hyperlipidemia  Bipolar  Substance abuse  History of avascular necrosis of the hip  History of rectal cancer  Hepatitis B  HIV  Bullous emphysema PI MM normal levels 2011 2018    Plan: This note may have been transcribed using [id number] [employer]. Please disregard any translational errors. Hospital Day: 4     Abnormal radiograph with hypoxemia  CT consistent with pneumonia  Procalcitonin improving    CTX and Azithromycin. 7 and 5 days total.   HIV controlled. Will get f/u chest X-ray           Emphysema  CT look similar to bullous emphysema. Versus cyst.  Alpha 1 antitrypsin deficiency negative in 2011 and 2018. Dulera  Wean oxygen to 90% saturation. Was on 5 L earlier. Has already been weaned back to 3 L. Combivent. No wheezing on today's exam           Stomal bleeding  Per surgery. Cirrhosis/hepatitis   GI consulted. Hyponatremia  Sodium 137 October 10, 2022. Defer to nephrology. Improving            Data:        PHYSICAL EXAM:   Blood pressure 126/85, pulse 83, temperature (!) 94.9 °F (34.9 °C), temperature source Axillary, resp. rate 16, height 6' 1\" (1.854 m), weight 262 lb 2 oz (118.9 kg), SpO2 99 %.'  Body mass index is 34.58 kg/m². Gen: No distress. Sleep  ENT:   Resp: No accessory muscle use. No crackles. No wheezes. No rhonchi. CV: Regular rate. Regular rhythm. No murmur or rub. No edema. Skin: Warm, dry, normal texture and turgor. No nodule on exposed extremities. M/S: No cyanosis. No clubbing. No joint deformity.   Psych: Asleep    Medications:    Scheduled Meds:   nicotine  1 patch TransDERmal Daily    raltegravir  400 mg Oral BID emtricitabine  200 mg Oral Daily    And    Tenofovir Alafenamide Fumarate  25 mg Oral Daily    mometasone-formoterol  2 puff Inhalation BID    azithromycin  250 mg IntraVENous Q24H    sodium chloride flush  10 mL IntraVENous 2 times per day    pantoprazole  40 mg IntraVENous Daily    cefTRIAXone (ROCEPHIN) IV  2,000 mg IntraVENous Q24H    clonazePAM  2 mg Oral BID    QUEtiapine  800 mg Oral Nightly    insulin lispro  0-8 Units SubCUTAneous TID WC    insulin lispro  0-4 Units SubCUTAneous Nightly    albuterol-ipratropium  1 puff Inhalation TID       Continuous Infusions:   sodium chloride      sodium chloride 25 mL/hr at 11/18/22 1818    dextrose         PRN Meds:  oxyCODONE, sodium chloride, sodium chloride flush, sodium chloride, promethazine **OR** ondansetron, polyethylene glycol, acetaminophen **OR** acetaminophen, albuterol, albuterol sulfate HFA, glucose, dextrose bolus **OR** dextrose bolus, glucagon (rDNA), dextrose    Labs reviewed:  CBC:   Recent Labs     11/17/22  2305 11/18/22  0445 11/19/22  0805   WBC  --  5.9 5.9   HGB 8.7* 8.9* 8.6*   HCT 25.8* 26.4* 25.6*   MCV  --  89.3 90.0   PLT  --  66* 75*       BMP:   Recent Labs     11/18/22  0445 11/19/22  0450   * 132*   K 3.6 3.5   CL 95* 99   CO2 23 21   BUN 22* 19   CREATININE 1.1 0.9       LIVER PROFILE:   Recent Labs     11/18/22  0445 11/19/22  0450   AST 1,175* 803*   * 830*   BILIDIR 0.5*  --    BILITOT 0.7 0.7   ALKPHOS 67 69       PT/INR:   No results for input(s): PROTIME, INR in the last 72 hours. APTT: No results for input(s): APTT in the last 72 hours. UA:No results for input(s): NITRITE, COLORU, PHUR, LABCAST, WBCUA, RBCUA, MUCUS, TRICHOMONAS, YEAST, BACTERIA, CLARITYU, SPECGRAV, LEUKOCYTESUR, UROBILINOGEN, BILIRUBINUR, BLOODU, GLUCOSEU, AMORPHOUS in the last 72 hours. Invalid input(s): Dorothea Rader  No results for input(s): PH, PCO2, PO2 in the last 72 hours. Cx:      Films:              This note was transcribed using Dragon Dictation software. Please disregard any translational errors.       Denita Rowe Pulmonary, Sleep and [facility] [phone]

## 2022-11-21 LAB
GLUCOSE BLD-MCNC: 167 MG/DL (ref 70–99)
GLUCOSE BLD-MCNC: 183 MG/DL (ref 70–99)
GLUCOSE BLD-MCNC: 220 MG/DL (ref 70–99)
GLUCOSE BLD-MCNC: 222 MG/DL (ref 70–99)
PERFORMED ON: ABNORMAL
PROCALCITONIN: 0.55 NG/ML (ref 0–0.15)

## 2022-11-21 PROCEDURE — 94640 AIRWAY INHALATION TREATMENT: CPT

## 2022-11-21 PROCEDURE — 6370000000 HC RX 637 (ALT 250 FOR IP): Performed by: STUDENT IN AN ORGANIZED HEALTH CARE EDUCATION/TRAINING PROGRAM

## 2022-11-21 PROCEDURE — 94760 N-INVAS EAR/PLS OXIMETRY 1: CPT

## 2022-11-21 PROCEDURE — C9113 INJ PANTOPRAZOLE SODIUM, VIA: HCPCS | Performed by: INTERNAL MEDICINE

## 2022-11-21 PROCEDURE — 6360000002 HC RX W HCPCS: Performed by: INTERNAL MEDICINE

## 2022-11-21 PROCEDURE — 2580000003 HC RX 258: Performed by: INTERNAL MEDICINE

## 2022-11-21 PROCEDURE — 6370000000 HC RX 637 (ALT 250 FOR IP): Performed by: HOSPITALIST

## 2022-11-21 PROCEDURE — 6370000000 HC RX 637 (ALT 250 FOR IP): Performed by: INTERNAL MEDICINE

## 2022-11-21 PROCEDURE — 97166 OT EVAL MOD COMPLEX 45 MIN: CPT

## 2022-11-21 PROCEDURE — 6360000002 HC RX W HCPCS

## 2022-11-21 PROCEDURE — 97530 THERAPEUTIC ACTIVITIES: CPT

## 2022-11-21 PROCEDURE — 1200000000 HC SEMI PRIVATE

## 2022-11-21 PROCEDURE — 99233 SBSQ HOSP IP/OBS HIGH 50: CPT | Performed by: INTERNAL MEDICINE

## 2022-11-21 PROCEDURE — 84145 PROCALCITONIN (PCT): CPT

## 2022-11-21 PROCEDURE — 2580000003 HC RX 258

## 2022-11-21 PROCEDURE — 2700000000 HC OXYGEN THERAPY PER DAY

## 2022-11-21 PROCEDURE — 97535 SELF CARE MNGMENT TRAINING: CPT

## 2022-11-21 RX ADMIN — RALTEGRAVIR 400 MG: 400 TABLET, FILM COATED ORAL at 09:07

## 2022-11-21 RX ADMIN — CLONAZEPAM 2 MG: 1 TABLET ORAL at 09:07

## 2022-11-21 RX ADMIN — QUETIAPINE FUMARATE 800 MG: 150 TABLET, EXTENDED RELEASE ORAL at 21:42

## 2022-11-21 RX ADMIN — CEFTRIAXONE 2000 MG: 2 INJECTION, POWDER, FOR SOLUTION INTRAMUSCULAR; INTRAVENOUS at 19:00

## 2022-11-21 RX ADMIN — Medication 2 PUFF: at 21:15

## 2022-11-21 RX ADMIN — INSULIN LISPRO 2 UNITS: 100 INJECTION, SOLUTION INTRAVENOUS; SUBCUTANEOUS at 13:57

## 2022-11-21 RX ADMIN — TENOFOVIR ALAFENAMIDE 25 MG: 25 TABLET ORAL at 09:07

## 2022-11-21 RX ADMIN — IPRATROPIUM BROMIDE AND ALBUTEROL 1 PUFF: 20; 100 SPRAY, METERED RESPIRATORY (INHALATION) at 08:21

## 2022-11-21 RX ADMIN — CLONAZEPAM 2 MG: 1 TABLET ORAL at 21:37

## 2022-11-21 RX ADMIN — OXYCODONE 2.5 MG: 5 TABLET ORAL at 03:51

## 2022-11-21 RX ADMIN — SODIUM CHLORIDE, PRESERVATIVE FREE 10 ML: 5 INJECTION INTRAVENOUS at 09:51

## 2022-11-21 RX ADMIN — OXYCODONE 2.5 MG: 5 TABLET ORAL at 20:06

## 2022-11-21 RX ADMIN — IPRATROPIUM BROMIDE AND ALBUTEROL 1 PUFF: 20; 100 SPRAY, METERED RESPIRATORY (INHALATION) at 14:24

## 2022-11-21 RX ADMIN — Medication 2 PUFF: at 08:21

## 2022-11-21 RX ADMIN — INSULIN LISPRO 2 UNITS: 100 INJECTION, SOLUTION INTRAVENOUS; SUBCUTANEOUS at 17:51

## 2022-11-21 RX ADMIN — RALTEGRAVIR 400 MG: 400 TABLET, FILM COATED ORAL at 21:42

## 2022-11-21 RX ADMIN — AZITHROMYCIN DIHYDRATE 250 MG: 500 INJECTION, POWDER, LYOPHILIZED, FOR SOLUTION INTRAVENOUS at 09:52

## 2022-11-21 RX ADMIN — PANTOPRAZOLE SODIUM 40 MG: 40 INJECTION, POWDER, FOR SOLUTION INTRAVENOUS at 09:08

## 2022-11-21 RX ADMIN — IPRATROPIUM BROMIDE AND ALBUTEROL 1 PUFF: 20; 100 SPRAY, METERED RESPIRATORY (INHALATION) at 21:15

## 2022-11-21 RX ADMIN — EMTRICITABINE 200 MG: 200 CAPSULE ORAL at 09:07

## 2022-11-21 ASSESSMENT — PAIN SCALES - GENERAL
PAINLEVEL_OUTOF10: 7
PAINLEVEL_OUTOF10: 4
PAINLEVEL_OUTOF10: 0
PAINLEVEL_OUTOF10: 0
PAINLEVEL_OUTOF10: 2

## 2022-11-21 ASSESSMENT — PAIN DESCRIPTION - PAIN TYPE: TYPE: ACUTE PAIN

## 2022-11-21 ASSESSMENT — PAIN SCALES - WONG BAKER
WONGBAKER_NUMERICALRESPONSE: 0
WONGBAKER_NUMERICALRESPONSE: 2

## 2022-11-21 ASSESSMENT — PAIN DESCRIPTION - DESCRIPTORS
DESCRIPTORS: ACHING;DISCOMFORT
DESCRIPTORS: ACHING

## 2022-11-21 ASSESSMENT — PAIN DESCRIPTION - ORIENTATION
ORIENTATION: LEFT
ORIENTATION: LOWER

## 2022-11-21 ASSESSMENT — PAIN DESCRIPTION - FREQUENCY: FREQUENCY: INTERMITTENT

## 2022-11-21 ASSESSMENT — PAIN - FUNCTIONAL ASSESSMENT
PAIN_FUNCTIONAL_ASSESSMENT: PREVENTS OR INTERFERES SOME ACTIVE ACTIVITIES AND ADLS
PAIN_FUNCTIONAL_ASSESSMENT: ACTIVITIES ARE NOT PREVENTED

## 2022-11-21 ASSESSMENT — PAIN DESCRIPTION - LOCATION
LOCATION: CHEST
LOCATION: BACK

## 2022-11-21 ASSESSMENT — PAIN DESCRIPTION - ONSET: ONSET: ON-GOING

## 2022-11-21 NOTE — PROGRESS NOTES
[facility]                                               Progress note    CC: bleeding colostomy stoma  Summary:   Ivone Fierro is being seen by nephrology for Acute kidney injury (DEISI). He is a 47 y.o. male with a PMH significant for HIV, HBV with cirrhosis, hypertension who presented to the ED 11/16/2022 with bleeding from his colostomy stoma. Bleeding had been ongoing for 2 days. He was feeling dizzy and lightheaded so he cam to the ED. HE was noted to have a hemoglobin of 10.7, but a Cr of 1.9, and he was severely hypotensive as low as 68/57. He had also been complaining of hematuria but harris placement was difficult so he had to undergo cystoscopic urethral dilation and harris placement. Interval History  Patient was seen and examined at bedside  Sitting in the chair  Awake and alert but has been a bit lethargic. He is unsteady his feet  Breathing is still not back to his baseline but better  Pain better controlled. Tolerating PO     On 2L      Plan:   Has trace edema but no overt volume overload. Tolerating PO   No new labs. Check renal panel tomorrow. BP acceptable. As of last labs his DEISI has resolved. Danii Aguilera MD  [facility]  Office: [phone]    Assessment:   DEISI:  - baseline Cr 1.0-1.2  - Cr at time of admission was 1.9, associated with bleeding from the stoma, hypotension  - also had urinary retention due to urethral stricture which was dilated cystoscopically by urology upon presentation to the ED due to difficulty placing a harris. - DEISI likely 2/2 hypotension, hemodynamic insult. Acute metabolic acidosis:  - 2/2 ARF and hypotension, improving     Chronic right sided hydronephrosis:  - likely has a solitary functioning left kidney  - Hydronephrosis was present on US in 2021 also.      Bleeding stoma:  - per GI and gen-surg.  - s/p EGD 11/17/2022, mild portal gastropathy, pre-pyloric ulcerations, clean based ulcers in the duodenal bulb, no signs of active bleeding.  - ileoscopy was also done showing reactive appearing nodule with ulceration at the os of the ostomy. ROS:     Positives Listed Bold. All other remaining systems are negative. Constitutional:  fever, chills, weakness, weight change, fatigue,      Skin:  rash, pruritus, hair loss, bruising, dry skin, petechiae. Head, Face, Neck   headaches, swelling,  cervical adenopathy. Respiratory: shortness of breath, cough, or wheezing  Cardiovascular: chest pain, palpitations, dizzy, edema  Gastrointestinal: nausea, vomiting, diarrhea, constipation,belly pain    Yellow skin, blood in stool  Musculoskeletal:  back pain, muscle weakness, gait problems,       joint pain or swelling. Genitourinary:  dysuria, poor urine flow, flank pain, blood in urine  Neurologic:  vertigo, TIA'S, syncope, seizures, focal weakness  Psychosocial:  insomnia, anxiety, or depression. Additional positive findings: -     PMH:   Past medical history, surgical history, social history, family history are reviewed and updated as appropriate. Reviewed current medication list.   Allergies reviewed and updated as needed. PE:   Vitals:    11/21/22 1245   BP: 101/68   Pulse: 97   Resp: 14   Temp: 97.5 °F (36.4 °C)   SpO2:        General appearance: in NAD, lethargic. Comfortable. HEENT: EOM intact, no icterus. Trachea is midline. Neck : No masses, appears symmetrical, no JVD  Respiratory: Respiratory effort appears normal, bilateral equal chest rise, + wheezes diffusely   Cardiovascular: Ausculation shows RRR trace edema  Abdomen: No visible mass or tenderness, non distended. Musculoskeletal:  Joints with no swelling or deformity. Skin:no rashes, ulcers, induration, no jaundice. Neuro: face symmetric, no focal deficits. Appropriate responses.        Lab Results   Component Value Date    CREATININE 0.9 11/19/2022    BUN 19 11/19/2022     (L) 11/19/2022    K 3.5 11/19/2022    CL 99 11/19/2022    CO2 21 11/19/2022      Lab Results   Component Value Date    WBC 5.9 11/19/2022    HGB 8.6 (L) 11/19/2022    HCT 25.6 (L) 11/19/2022    MCV 90.0 11/19/2022    PLT 75 (L) 11/19/2022     Lab Results   Component Value Date    CALCIUM 7.8 (L) 11/19/2022    PHOS 2.2 06/03/2021

## 2022-11-21 NOTE — PROGRESS NOTES
Patient resting in bed upon assessment, A/Ox4 and vital signs stable, remains on 3L nasal cannula. Tele and continuous pulse ox on, scheduled medications given. Patient has no complaints, alarm on, all needs met at this time.

## 2022-11-21 NOTE — PROGRESS NOTES
Occupational Therapy  Facility/Department: [address] ORTHOPEDICS  Occupational Therapy Treatment Note    Name: Hermelindo Meeks  : 1970  MRN: [id number]  Date of Service: 2022    Discharge Recommendations:  5-7 sessions per week, Patient would benefit from continued therapy after discharge        Hermelindo Meeks scored a 16/24 on the AM-PAC ADL Inpatient form. Current research shows that an AM-PAC score of 17 or less is typically not associated with a discharge to the patient's home setting. Based on the patient's AM-PAC score and their current ADL deficits, it is recommended that the patient have 5-7 sessions per week of Occupational Therapy at d/c to increase the patient's independence. At this time, this patient demonstrates complex nursing, medical, and rehabilitative needs, and would benefit from intensive rehabilitation services upon discharge from the Inpatient setting. This patient demonstrates the ability to participate in and benefit from an intensive therapy program with a coordinated interdisciplinary team approach. Please see assessment section for further patient specific details. If patient discharges prior to next session this note will serve as a discharge summary. Please see below for the latest assessment towards goals. Patient Diagnosis(es): The primary encounter diagnosis was Bleeding from colostomy stoma ([facility] [address] Diagnoses of Hypotension due to hypovolemia, Tachycardia, Hematuria, unspecified type, Goals of care, counseling/discussion, Anemia, unspecified type, and Lower GI bleed were also pertinent to this visit. Past Medical History:  has a past medical history of HIV (human immunodeficiency virus infection) ([facility] [address] Past Surgical History:  has a past surgical history that includes Upper gastrointestinal endoscopy (N/A, 2022) and ileoscopy (N/A, 2022).     Treatment Diagnosis: impaired ADL/fxl mobility    Assessment   Performance deficits / Impairments: Decreased Yes  Additional Pertinent Hx: 46 yo male admitted for bleeding of colostomy stoma, Cirrhosis/hepatitis, and Upper respiratory infection with emphysema. PMH: GERD  HTN, Hyperlipidemia, Bipolar, Substance abuse, avascular necrosis LLE (DARYN, 2014), rectal cancer, Hepatitis B, HIV  Family / Caregiver Present: No  Referring Practitioner: Queen Mei MD  Subjective  Subjective: Pt resting in recliner upon arrival and agreeable to OT tx. Pt reports no pain  General Comment  Comments: RN ok to see       Social/Functional History  Social/Functional History  Lives With: Significant other  Type of Home: House  Home Layout: One level  Home Access: Stairs to enter with rails  Entrance Stairs - Number of Steps: 5  Entrance Stairs - Rails: Both  Bathroom Shower/Tub: Walk-in shower  Bathroom Toilet: Standard  Bathroom Equipment: Shower chair, Grab bars in shower, Grab bars around toilet  Home Equipment: [place], [employer], Walker, 4 wheeled, Grab bars  Has the patient had two or more falls in the past year or any fall with injury in the past year?: No  ADL Assistance: Independent  Homemaking Assistance: Independent  Ambulation Assistance: Independent (no AD)  Transfer Assistance: Independent  Active : Yes  Occupation: On disability  IADL Comments: sleeps in a regular bed  Additional Comments: girlfriend is disabled and does not work       Objective            Safety Devices  Type of Devices: Call light within reach; Chair alarm in place;Gait belt;Left in chair;Nurse notified  Restraints  Restraints Initially in Place: No             ADL  Grooming: Setup  Grooming Skilled Clinical Factors: seated face washing  LE Dressing: Minimal assistance;Maximum assistance  LE Dressing Skilled Clinical Factors: Min A - donning socks.  Max A donning shoes          Bed mobility  Bed Mobility Comments: not observed    Transfers  Sit to stand: Contact guard assistance  Stand to sit: Contact guard assistance  Transfer Comments: RW. cues hand placement. Standing balance: CGA with BUE on RW ~30 sec to obtain balance/ensure not dizzy                    Fxl mobility: CGA with RW short distance- recliner>bedside chair>recliner ~10 feet.  Required seated rest break in chair       Cognition  Overall Cognitive Status: [facility]  Orientation  Overall Orientation Status: Within Functional Limits                    Education Given To: Patient  Education Provided: Role of Therapy;Plan of Care;Transfer Training  Education Provided Comments: need for skilled OT prior to return home  Education Method: Demonstration;Verbal  Barriers to Learning: None  Education Outcome: Verbalized understanding;Demonstrated understanding;Continued education needed                      AM-PAC Score        AM-PAC Inpatient Daily Activity Raw Score: 16 (11/21/22 1639)  AM-PAC Inpatient ADL T-Scale Score : 35.96 (11/21/22 1639)  ADL Inpatient CMS 0-100% Score: 53.32 (11/21/22 1639)  ADL Inpatient CMS G-Code Modifier : CK (11/21/22 1639)    Goals  Short Term Goals  Time Frame for Short Term Goals: prior to d/c: ongoing  Short Term Goal 1: toileting SBA  Short Term Goal 2: LB dressing with AE as needed SBA  Short Term Goal 3: tolerates 3 min fxl standing task SBA  Short Term Goal 4: UB bathing/dressing seated set up  Short Term Goal 5: fxl tx and mobility with RW household distances SBA  Patient Goals   Patient goals : improve endurance and return home       Therapy Time   Individual Concurrent Group Co-treatment   Time In 1200         Time Out 1240         Minutes 40         Timed Code Treatment Minutes: 40 Minutes (15 ADL 25 TA)       MESSI Valverde, OTR/L

## 2022-11-21 NOTE — PROGRESS NOTES
Patient continues to rest in bed this shift, sits on side of bed to use urinal, tolerating well. Vital signs remain stable, alarm on, all needs met.

## 2022-11-21 NOTE — PROGRESS NOTES
Pulmonary Progress Note    Date of Admission: 11/16/2022   LOS: 5 days       CC:  Chief Complaint   Patient presents with    Rectal Bleeding     Pt has been bleeding from colostomy for 2 days, pt is pale and diaphoretic, tachy and hypotensive         Subjective:  Awake in chair. No complaints. .         Assessment:     GERD  Hypertension  Hyperlipidemia  Bipolar  Substance abuse  History of avascular necrosis of the hip  History of rectal cancer  Hepatitis B  HIV  Bullous emphysema PI MM normal levels 2011 2018    Plan: This note may have been transcribed using [id number] [employer]. Please disregard any translational errors. Hospital Day: 5     Abnormal radiograph with hypoxemia  CT consistent with pneumonia  Procalcitonin improving    CTX and Azithromycin. 7 and 5 days total.   HIV controlled. Persistent opacity on chest x-ray          Emphysema  CT look similar to bullous emphysema. Versus cyst.  Alpha 1 antitrypsin deficiency negative in 2011 and 2018. Dulera  Wean oxygen to 90% saturation. Weaned to 2 L today. Combivent. No wheezing on today's exam           Stomal bleeding  Per surgery. Cirrhosis/hepatitis   GI consulted. Hyponatremia  Sodium 137 October 10, 2022. Defer to nephrology. Improving            Data:        PHYSICAL EXAM:   Blood pressure 99/67, pulse 96, temperature 97.3 °F (36.3 °C), temperature source Oral, resp. rate 14, height 6' 1\" (1.854 m), weight 268 lb 11.9 oz (121.9 kg), SpO2 94 %.'  Body mass index is 35.46 kg/m². Gen: No distress. ENT:   Resp: No accessory muscle use. No crackles. No wheezes. No rhonchi. CV: Regular rate. Regular rhythm. No murmur or rub. No edema. Skin: Warm, dry, normal texture and turgor. No nodule on exposed extremities. M/S: No cyanosis. No clubbing. No joint deformity.   Psych: Awake in chair    Medications:    Scheduled Meds:   nicotine  1 patch TransDERmal Daily    raltegravir  400 mg Oral BID    emtricitabine 200 mg Oral Daily    And    Tenofovir Alafenamide Fumarate  25 mg Oral Daily    mometasone-formoterol  2 puff Inhalation BID    azithromycin  250 mg IntraVENous Q24H    sodium chloride flush  10 mL IntraVENous 2 times per day    pantoprazole  40 mg IntraVENous Daily    cefTRIAXone (ROCEPHIN) IV  2,000 mg IntraVENous Q24H    clonazePAM  2 mg Oral BID    QUEtiapine  800 mg Oral Nightly    insulin lispro  0-8 Units SubCUTAneous TID WC    insulin lispro  0-4 Units SubCUTAneous Nightly    albuterol-ipratropium  1 puff Inhalation TID       Continuous Infusions:   sodium chloride      sodium chloride 25 mL/hr at 11/18/22 1818    dextrose         PRN Meds:  oxyCODONE, sodium chloride, sodium chloride flush, sodium chloride, promethazine **OR** ondansetron, polyethylene glycol, acetaminophen **OR** acetaminophen, albuterol, albuterol sulfate HFA, glucose, dextrose bolus **OR** dextrose bolus, glucagon (rDNA), dextrose    Labs reviewed:  CBC:   Recent Labs     11/19/22  0805   WBC 5.9   HGB 8.6*   HCT 25.6*   MCV 90.0   PLT 75*       BMP:   Recent Labs     11/19/22  0450   *   K 3.5   CL 99   CO2 21   BUN 19   CREATININE 0.9       LIVER PROFILE:   Recent Labs     11/19/22  0450   *   *   BILITOT 0.7   ALKPHOS 69       PT/INR:   No results for input(s): PROTIME, INR in the last 72 hours. APTT: No results for input(s): APTT in the last 72 hours. UA:No results for input(s): NITRITE, COLORU, PHUR, LABCAST, WBCUA, RBCUA, MUCUS, TRICHOMONAS, YEAST, BACTERIA, CLARITYU, SPECGRAV, LEUKOCYTESUR, UROBILINOGEN, BILIRUBINUR, BLOODU, GLUCOSEU, AMORPHOUS in the last 72 hours. Invalid input(s): Luis Ends  No results for input(s): PH, PCO2, PO2 in the last 72 hours. Cx:      Films: This note was transcribed using [id number] [employer]. Please disregard any translational errors.       Denita Rowe Pulmonary, Sleep and [facility] [phone]

## 2022-11-22 LAB
A/G RATIO: 0.8 (ref 1.1–2.2)
ALBUMIN SERPL-MCNC: 2.1 G/DL (ref 3.4–5)
ALP BLD-CCNC: 77 U/L (ref 40–129)
ALT SERPL-CCNC: 255 U/L (ref 10–40)
ANION GAP SERPL CALCULATED.3IONS-SCNC: 11 MMOL/L (ref 3–16)
AST SERPL-CCNC: 71 U/L (ref 15–37)
BILIRUB SERPL-MCNC: 0.6 MG/DL (ref 0–1)
BUN BLDV-MCNC: 11 MG/DL (ref 7–20)
CALCIUM SERPL-MCNC: 8.2 MG/DL (ref 8.3–10.6)
CHLORIDE BLD-SCNC: 106 MMOL/L (ref 99–110)
CO2: 25 MMOL/L (ref 21–32)
CREAT SERPL-MCNC: 0.6 MG/DL (ref 0.9–1.3)
GFR SERPL CREATININE-BSD FRML MDRD: >60 ML/MIN/{1.73_M2}
GLUCOSE BLD-MCNC: 152 MG/DL (ref 70–99)
GLUCOSE BLD-MCNC: 154 MG/DL (ref 70–99)
GLUCOSE BLD-MCNC: 216 MG/DL (ref 70–99)
GLUCOSE BLD-MCNC: 259 MG/DL (ref 70–99)
GLUCOSE BLD-MCNC: 294 MG/DL (ref 70–99)
HCT VFR BLD CALC: 24.5 % (ref 40.5–52.5)
HEMOGLOBIN: 8.2 G/DL (ref 13.5–17.5)
HIV QUANT LOG: <1.3 LOG CPY/ML
HIV-1 RNA BY PCR, QN: <20 CPY/ML
HIV-1 RNA BY PCR, QN: DETECTED
MAGNESIUM: 1.6 MG/DL (ref 1.8–2.4)
MCH RBC QN AUTO: 30.6 PG (ref 26–34)
MCHC RBC AUTO-ENTMCNC: 33.6 G/DL (ref 31–36)
MCV RBC AUTO: 90.9 FL (ref 80–100)
PDW BLD-RTO: 15.5 % (ref 12.4–15.4)
PERFORMED ON: ABNORMAL
PHOSPHORUS: 3.3 MG/DL (ref 2.5–4.9)
PLATELET # BLD: 121 K/UL (ref 135–450)
PMV BLD AUTO: 9.2 FL (ref 5–10.5)
POTASSIUM REFLEX MAGNESIUM: 3.5 MMOL/L (ref 3.5–5.1)
POTASSIUM SERPL-SCNC: 3.5 MMOL/L (ref 3.5–5.1)
RBC # BLD: 2.69 M/UL (ref 4.2–5.9)
SODIUM BLD-SCNC: 142 MMOL/L (ref 136–145)
SOURCE: ABNORMAL
TOTAL PROTEIN: 4.7 G/DL (ref 6.4–8.2)
WBC # BLD: 3.6 K/UL (ref 4–11)

## 2022-11-22 PROCEDURE — 97530 THERAPEUTIC ACTIVITIES: CPT

## 2022-11-22 PROCEDURE — 2580000003 HC RX 258: Performed by: INTERNAL MEDICINE

## 2022-11-22 PROCEDURE — 97535 SELF CARE MNGMENT TRAINING: CPT

## 2022-11-22 PROCEDURE — 6370000000 HC RX 637 (ALT 250 FOR IP): Performed by: NURSE PRACTITIONER

## 2022-11-22 PROCEDURE — 2700000000 HC OXYGEN THERAPY PER DAY

## 2022-11-22 PROCEDURE — 94640 AIRWAY INHALATION TREATMENT: CPT

## 2022-11-22 PROCEDURE — 6360000002 HC RX W HCPCS: Performed by: INTERNAL MEDICINE

## 2022-11-22 PROCEDURE — 80053 COMPREHEN METABOLIC PANEL: CPT

## 2022-11-22 PROCEDURE — 6370000000 HC RX 637 (ALT 250 FOR IP): Performed by: HOSPITALIST

## 2022-11-22 PROCEDURE — C9113 INJ PANTOPRAZOLE SODIUM, VIA: HCPCS | Performed by: INTERNAL MEDICINE

## 2022-11-22 PROCEDURE — 97116 GAIT TRAINING THERAPY: CPT

## 2022-11-22 PROCEDURE — 6370000000 HC RX 637 (ALT 250 FOR IP): Performed by: INTERNAL MEDICINE

## 2022-11-22 PROCEDURE — 83735 ASSAY OF MAGNESIUM: CPT

## 2022-11-22 PROCEDURE — 94760 N-INVAS EAR/PLS OXIMETRY 1: CPT

## 2022-11-22 PROCEDURE — 6370000000 HC RX 637 (ALT 250 FOR IP): Performed by: STUDENT IN AN ORGANIZED HEALTH CARE EDUCATION/TRAINING PROGRAM

## 2022-11-22 PROCEDURE — 1200000000 HC SEMI PRIVATE

## 2022-11-22 PROCEDURE — 85027 COMPLETE CBC AUTOMATED: CPT

## 2022-11-22 PROCEDURE — 2580000003 HC RX 258

## 2022-11-22 PROCEDURE — 6360000002 HC RX W HCPCS

## 2022-11-22 PROCEDURE — 99233 SBSQ HOSP IP/OBS HIGH 50: CPT | Performed by: INTERNAL MEDICINE

## 2022-11-22 RX ORDER — INSULIN LISPRO 100 [IU]/ML
0-8 INJECTION, SOLUTION INTRAVENOUS; SUBCUTANEOUS
Qty: 10 ML | Refills: 0
Start: 2022-11-22 | End: 2022-11-23 | Stop reason: HOSPADM

## 2022-11-22 RX ORDER — OXYCODONE HYDROCHLORIDE 5 MG/1
2.5 TABLET ORAL EVERY 8 HOURS PRN
Qty: 6 TABLET | Refills: 0
Start: 2022-11-22 | End: 2022-11-25

## 2022-11-22 RX ORDER — INSULIN LISPRO 100 [IU]/ML
0-4 INJECTION, SOLUTION INTRAVENOUS; SUBCUTANEOUS NIGHTLY
Qty: 10 ML | Refills: 0
Start: 2022-11-22 | End: 2022-11-23 | Stop reason: HOSPADM

## 2022-11-22 RX ORDER — LANOLIN ALCOHOL/MO/W.PET/CERES
400 CREAM (GRAM) TOPICAL 2 TIMES DAILY
Status: DISCONTINUED | OUTPATIENT
Start: 2022-11-22 | End: 2022-11-23 | Stop reason: HOSPADM

## 2022-11-22 RX ORDER — LANOLIN ALCOHOL/MO/W.PET/CERES
400 CREAM (GRAM) TOPICAL 2 TIMES DAILY
Qty: 60 TABLET | Refills: 0
Start: 2022-11-22 | End: 2022-11-23 | Stop reason: HOSPADM

## 2022-11-22 RX ADMIN — CLONAZEPAM 2 MG: 1 TABLET ORAL at 21:50

## 2022-11-22 RX ADMIN — IPRATROPIUM BROMIDE AND ALBUTEROL 1 PUFF: 20; 100 SPRAY, METERED RESPIRATORY (INHALATION) at 21:04

## 2022-11-22 RX ADMIN — TENOFOVIR ALAFENAMIDE 25 MG: 25 TABLET ORAL at 09:35

## 2022-11-22 RX ADMIN — IPRATROPIUM BROMIDE AND ALBUTEROL 1 PUFF: 20; 100 SPRAY, METERED RESPIRATORY (INHALATION) at 09:01

## 2022-11-22 RX ADMIN — OXYCODONE 2.5 MG: 5 TABLET ORAL at 20:06

## 2022-11-22 RX ADMIN — RALTEGRAVIR 400 MG: 400 TABLET, FILM COATED ORAL at 09:34

## 2022-11-22 RX ADMIN — QUETIAPINE FUMARATE 800 MG: 150 TABLET, EXTENDED RELEASE ORAL at 21:50

## 2022-11-22 RX ADMIN — EMTRICITABINE 200 MG: 200 CAPSULE ORAL at 09:34

## 2022-11-22 RX ADMIN — OXYCODONE 2.5 MG: 5 TABLET ORAL at 09:33

## 2022-11-22 RX ADMIN — Medication 2 PUFF: at 21:04

## 2022-11-22 RX ADMIN — CEFTRIAXONE 2000 MG: 2 INJECTION, POWDER, FOR SOLUTION INTRAMUSCULAR; INTRAVENOUS at 18:43

## 2022-11-22 RX ADMIN — INSULIN LISPRO 4 UNITS: 100 INJECTION, SOLUTION INTRAVENOUS; SUBCUTANEOUS at 18:45

## 2022-11-22 RX ADMIN — Medication 2 PUFF: at 09:01

## 2022-11-22 RX ADMIN — RALTEGRAVIR 400 MG: 400 TABLET, FILM COATED ORAL at 21:50

## 2022-11-22 RX ADMIN — INSULIN LISPRO 4 UNITS: 100 INJECTION, SOLUTION INTRAVENOUS; SUBCUTANEOUS at 13:43

## 2022-11-22 RX ADMIN — SODIUM CHLORIDE, PRESERVATIVE FREE 10 ML: 5 INJECTION INTRAVENOUS at 19:56

## 2022-11-22 RX ADMIN — PANTOPRAZOLE SODIUM 40 MG: 40 INJECTION, POWDER, FOR SOLUTION INTRAVENOUS at 09:35

## 2022-11-22 RX ADMIN — CLONAZEPAM 2 MG: 1 TABLET ORAL at 09:35

## 2022-11-22 ASSESSMENT — PAIN DESCRIPTION - PAIN TYPE: TYPE: ACUTE PAIN

## 2022-11-22 ASSESSMENT — PAIN DESCRIPTION - DESCRIPTORS: DESCRIPTORS: SHARP

## 2022-11-22 ASSESSMENT — PAIN SCALES - GENERAL
PAINLEVEL_OUTOF10: 0
PAINLEVEL_OUTOF10: 2
PAINLEVEL_OUTOF10: 2
PAINLEVEL_OUTOF10: 7
PAINLEVEL_OUTOF10: 0

## 2022-11-22 ASSESSMENT — PAIN DESCRIPTION - LOCATION: LOCATION: FLANK;CHEST

## 2022-11-22 ASSESSMENT — PAIN DESCRIPTION - ORIENTATION: ORIENTATION: LEFT

## 2022-11-22 ASSESSMENT — PAIN - FUNCTIONAL ASSESSMENT: PAIN_FUNCTIONAL_ASSESSMENT: PREVENTS OR INTERFERES SOME ACTIVE ACTIVITIES AND ADLS

## 2022-11-22 ASSESSMENT — PAIN DESCRIPTION - ONSET: ONSET: ON-GOING

## 2022-11-22 ASSESSMENT — PAIN DESCRIPTION - FREQUENCY: FREQUENCY: INTERMITTENT

## 2022-11-22 NOTE — CONSULTS
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 Found to have acute blood loss anemia with hemorrhagic shock, acute hypoxic respiratory failure. Underwent EGD and ileoscopy (11/17) which revealed moderate portal gastropathy, pre-pyloric ulcerations (no bleeding), several duodenal ulcers, reactive appearing nodule with ulceration noted at the os of the ileostomy (no active bleeding). Course complicated by sepsis due to pneumonia, DEISI, urethral stricture (s/p dilation 11/16). Currently, patient reports fatigue and generalized weakness. He has some shortness of breath with exertion. He has been tolerating po with normal ostomy output, denies any bleeding. Prior Level of Function:  Independent for mobility, ADLs, and IADLs    Current Level of Function:  CGA for mobility  Up to maxA for ADLs    Pertinent Social History:  Support: lives with significant other  Home set-up: 1 level with 5 steps to enter    Past Medical History:   Diagnosis Date    HIV (human immunodeficiency virus infection) ([facility] [place] [address]        Past Surgical History:   Procedure Laterality Date    ILEOSCOPY N/A 11/17/2022    ILEOSCOPY DX ONLY performed by Adair Trae., DO at [address] [place] [address] 11/17/2022    EGD BIOPSY performed by YooDeal Trae., DO at [address] [facility]       History reviewed. No pertinent family history.     Social History     Socioeconomic History    Marital status: Single     Spouse name: None    Number of children: None    Years of education: None    Highest education level: None           REVIEW OF SYSTEMS:   CONSTITUTIONAL: negative for fevers, chills, diaphoresis, appetite change, night sweats, unexpected weight change, +fatigue  EYES: negative for blurred vision, eye discharge, visual disturbance and icterus  HEENT: negative for hearing loss, tinnitus, ear drainage, sinus pressure, nasal congestion, epistaxis and snoring  RESPIRATORY: Negative for hemoptysis, cough, sputum production; +SAN  CARDIOVASCULAR: negative for chest pain, palpitations, exertional chest pressure/discomfort, syncope, edema  GASTROINTESTINAL: refer to HPI  GENITOURINARY: negative for frequency, dysuria, urinary incontinence, decreased urine volume, and hematuria  HEMATOLOGIC/LYMPHATIC: negative for easy bruising, bleeding and lymphadenopathy  ALLERGIC/IMMUNOLOGIC: negative for recurrent infections, angioedema, anaphylaxis and drug reactions  ENDOCRINE: negative for weight changes and diabetic symptoms including polyuria, polydipsia and polyphagia  MUSCULOSKELETAL: negative for pain, joint swelling, decreased range of motion  NEUROLOGICAL: negative for headaches, slurred speech, unilateral weakness  PSYCHIATRIC/BEHAVIORAL: negative for hallucinations, behavioral problems, confusion and agitation. Physical Examination:  Vitals: Patient Vitals for the past 24 hrs:   BP Temp Temp src Pulse Resp SpO2   11/22/22 0900 -- -- -- 97 16 96 %   11/22/22 0738 102/60 97.6 °F (36.4 °C) -- 91 18 91 %   11/22/22 0430 105/63 97.4 °F (36.3 °C) Oral 92 14 95 %   11/21/22 2352 125/72 98 °F (36.7 °C) Oral 90 16 94 %   11/21/22 2115 -- -- -- 98 18 93 %   11/21/22 2036 -- -- -- -- 16 --   11/21/22 2006 -- -- -- -- 20 --   11/21/22 1918 (!) 175/122 97.8 °F (36.6 °C) Oral 99 12 95 %   11/21/22 1601 (!) 139/111 97.7 °F (36.5 °C) Oral 98 17 --   11/21/22 1245 101/68 97.5 °F (36.4 °C) Oral 97 14 --     Const: Alert. WDWN.  No distress  Eyes: Conjunctiva noninjected, no icterus noted; pupils equal, round, and reactive to light. HENT: Atraumatic, normocephalic; Oral mucosa moist  Neck: Trachea midline, neck supple. No thyromegaly noted. CV: Regular rate and rhythm, no murmur rub or gallop noted  Resp: Lungs with mild bilateral wheezes. No rales wheezes or rhonchi, no retractions. Respirations unlabored. GI: Soft, nontender, nondistended. Normal bowel sounds. No palpable masses. Ostomy site without blood. Skin: Normal temperature and turgor. No rashes or breakdown noted on exposed areas. Ext: No significant edema appreciated. No varicosities. MSK: No joint tenderness, erythema, warmth noted. AROM intact. Neuro: Alert, oriented, appropriate. No cranial nerve deficits appreciated. Sensation intact to light touch. Motor examination reveals strength 5-/5 in BUE and BLE except 4/5 hip flexion. No abnormalities with finger/nose noted. Reflexes diminished, symmetric. Psych: Stable mood, normal judgement, normal affect     Lab Results   Component Value Date    WBC 3.6 (L) 11/22/2022    HGB 8.2 (L) 11/22/2022    HCT 24.5 (L) 11/22/2022    MCV 90.9 11/22/2022     (L) 11/22/2022     Lab Results   Component Value Date    INR 1.18 (H) 11/16/2022    INR 1.0 06/03/2021    PROTIME 14.9 (H) 11/16/2022    PROTIME 13.5 06/03/2021     Lab Results   Component Value Date    CREATININE 0.6 (L) 11/22/2022    BUN 11 11/22/2022     11/22/2022    K 3.5 11/22/2022    K 3.5 11/22/2022     11/22/2022    CO2 25 11/22/2022     Lab Results   Component Value Date     (H) 11/22/2022    AST 71 (H) 11/22/2022    ALKPHOS 77 11/22/2022    BILITOT 0.6 11/22/2022         Most recent EKG revealed:  Sinus tachycardiaOtherwise normal ECGNo previous ECGs availableConfirmed by Hector LESTER MD [phone]) on 11/17/2022 10:51:42 AM    Most recent imaging studies revealed:    CT chest  1. Patchy confluent airspace opacities in the left upper lobe which likely   reflect pneumonia. 2. Bullous emphysema.    3. Left internal jugular venous catheter extending to the central aspect of   the left brachiocephalic vein and right subclavian Port-A-Cath with a loop at   the central aspect of the subclavian vein. 4. Small pericardial effusion. 5. Hepatic cirrhosis and at least mild splenomegaly. US abdomen  Marked right hydronephrosis and marked right renal parenchymal atrophy. On my review, CXR displays left perihilar consolidation with new left lower lobe infiltrate compared to prior. Assessment:  Debility  Acute blood loss anemia  GI bleed - EGD and ileoscopy (11/17): portal gastropathy, pre-pyloric ulcerations, clean based ulcers in the duodenal bulb, reactive appearing nodule with ulceration at the os of the ostomy, no active bleeding. 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 Demonstrates ability to tolerate 3 hours therapy/day. He is a good candidate for acute inpatient rehab when medically appropriate. Patient currently indicating he plans to return home at discharge. Education provided to him on the benefits of ARU program and safety concerns for returning home. Thank you for this consult. Please contact me with any questions or concerns. Amarilis Ulloa.  Francois Centeno MD 11/22/2022, 9:21 AM

## 2022-11-22 NOTE — PLAN OF CARE
Problem: Pain  Goal: Verbalizes/displays adequate comfort level or baseline comfort level  Outcome: Progressing   Patient requested pain medication once, pain is moderate when taking beep breath, mainly on the right side of his chest.

## 2022-11-22 NOTE — CARE COORDINATION
11/22/22 1620   IMM Letter   IMM Letter given to Patient/Family/Significant other/Guardian/POA/by: given to patient  hs   IMM Letter date given: 11/22/22   IMM Letter time given: 1605     Education provided to patient. Patient reported no questions and verbalized understanding. Patient made aware that he/she has 4 hours prior to discharging from hospital to decide if he/she wishes to pursue the Medicare appeal process.

## 2022-11-22 NOTE — CARE COORDINATION
[facility]    DC order noted, all docs needed have been faxed to [facility] for home care services.     Home care to see patient within 24-48 hrs      Luis Verdugo RN, BSN CTN  [facility] [phone]

## 2022-11-22 NOTE — PROGRESS NOTES
Physical Therapy  Facility/Department: [facility] 3 ORTHOPEDICS  Physical Therapy treatment note    Name: Charlette Trinidad  : 1970  MRN: [id number]  Date of Service: 2022    Assessment / Discharge Recommendations:  -engages well for PT OT   -endurance remains very limited   -will need continued PTOT and nursing care in a skilled setting   -anticipate able to manage a high frequency of sessions as long as spaced apart to afford recovery time   Charlette Trinidad scored a 16/24 on the AM-PAC short mobility form. Current research shows that an AM-PAC score of 17 or less is typically not associated with a discharge to the patient's home setting. Based on the patient's AM-PAC score and their current functional mobility deficits, it is recommended that the patient have 5-7 sessions per week of Physical Therapy at d/c to increase the patient's independence. At this time, this patient demonstrates complex nursing, medical, and rehabilitative needs, and would benefit from intensive rehabilitation services upon discharge from the Inpatient setting. Patient Diagnosis(es): The primary encounter diagnosis was Bleeding from colostomy stoma ([facility] [address] Diagnoses of Hypotension due to hypovolemia, Tachycardia, Hematuria, unspecified type, Goals of care, counseling/discussion, Anemia, unspecified type, and Lower GI bleed were also pertinent to this visit. Past Medical History:  has a past medical history of HIV (human immunodeficiency virus infection) ([facility] [address] Past Surgical History:  has a past surgical history that includes Upper gastrointestinal endoscopy (N/A, 2022) and ileoscopy (N/A, 2022). Body Structures, Functions, Activity Limitations Requiring Skilled Therapeutic Intervention: Decreased functional mobility ; Decreased ADL status; Decreased endurance;Decreased balance  Requires PT Follow-Up: Yes  Activity Tolerance  Activity Tolerance: Patient limited by fatigue;Patient limited by endurance     Plan Physcial Therapy Plan  General Plan: 3-5 times per week  Current Treatment Recommendations: Strengthening, ROM, Functional mobility training, Transfer training, Endurance training, Gait training, Patient/Caregiver education & training, Therapeutic activities, Positioning  Safety Devices  Type of Devices: Call light within reach, Chair alarm in place, Gait belt, Left in chair, Nurse notified  Restraints  Restraints Initially in Place: No     Restrictions  Restrictions/Precautions  Restrictions/Precautions: Fall Risk  Position Activity Restriction  Other position/activity restrictions: O2 at 1 liter   telemonitor     Subjective   General  Chart Reviewed: Yes  Additional Pertinent Hx: Micheal Hawkins MD \"Patient is a 54-year-old male with past medical history of HIV, hepatitis B cirrhosis hypertension who presents to the hospital for bleeding from colostomy. According to patient he has been bleeding for past 48 hours. He mentions he feels dizzy lightheaded he mentions his bleeding has been slowing down in the past 48 hours. Denied fevers chills diarrhea. \"  Response To Previous Treatment: Patient with no complaints from previous session.   Family / Caregiver Present: No  Follows Commands: Within Functional Limits  Subjective  Subjective: arrived to room along with OT to patient resting in bed - awake but not alert - initially closing eyes and falling asleep as converation going on - agreeable to PTOT session and up to ambulate in room to bathroom to manage minor self care         Social/Functional History  Social/Functional History  Lives With: Significant other  Type of Home: [address]: One level  Home Access: Stairs to enter with rails  Entrance Stairs - Number of Steps: 5  Entrance Stairs - Rails: Both  Bathroom Shower/Tub: Walk-in shower  Bathroom Toilet: Standard  Bathroom Equipment: Shower chair, Grab bars in shower, Grab bars around toilet  Home Equipment: [place], [address], [address], 4 wheeled, KOGL bars  Has the patient had two or more falls in the past year or any fall with injury in the past year?: No  ADL Assistance: Independent  Homemaking Assistance: Independent  Ambulation Assistance: Independent (no AD)  Transfer Assistance: Independent  Active : Yes  Occupation: On disability  IADL Comments: sleeps in a regular bed  Additional Comments: girlfriend is disabled and does not work  Objective   Bed mobility  Supine to Sit: Contact guard assistance  Transfers  Sit to Stand: Minimal Assistance  Stand to Sit: Contact guard assistance  Ambulation  Surface: Level tile  Device: Rolling Walker  Other Apparatus: O2  Assistance: Contact guard assistance  Quality of Gait: moderate lean on rolling walker  - step through pattern - symmetric step length and adequate base of support  Distance: 10 feet to bathoom sink and once complete with activity at the sink 20 feet to the recliner  Comments: overall stable on the walker  More Ambulation?: No  Stairs/Curb  Stairs?: No  Balance  Comments: midline in sitting and static stance with moderate lean on the walker   -dynamic is fair on rolling walker (limited observation)  Standing: With support (Stood with RW for 2:00 and 2:00 required 5:00 sitting rest break x2)  -O2 sats drop to low 90's with standing at sink - recovers to mid 90's with sitting for several minutes    AM-PAC Score  AM-PAC Inpatient Mobility Raw Score : 16 (11/22/22 1057)  AM-PAC Inpatient T-Scale Score : 40.78 (11/22/22 1057)  Mobility Inpatient CMS 0-100% Score: 54.16 (11/22/22 1057)  Mobility Inpatient CMS G-Code Modifier : CK (11/22/22 1057)    Goals  Short Term Goals  Time Frame for Short Term Goals: upon d/c  Short Term Goal 1: bed mobility mod I  Short Term Goal 2: transfers mod I  Short Term Goal 3: ambulate 48' with LRAD mod I  Short Term Goal 4: stair assessment  Patient Goals   Patient Goals : \"to go home\"       Education  Patient Education  Education Given To: Patient  Education Provided: Energy Conservation; ADL Adaptive Strategies  Education Method: Verbal  Barriers to Learning: None  Education Outcome: Verbalized understanding;Continued education needed      Therapy Time   Individual Concurrent Group Co-treatment   Time In 0948         Time Out 1030         Minutes Ml 12, PT

## 2022-11-22 NOTE — DISCHARGE INSTR - COC
Continuity of Care Form    Patient Name: Mihaela Sinha   :  1970  MRN:  [id number]    Admit date:  2022  Discharge date: 2022  Code Status Order: Full Code   Advance Directives:     Admitting Physician:  Michael Johnson MD  PCP: Flower Montes De Oca. Gricel Steven MD, MD    Discharging Nurse: Mat Bedoya RN   [address] Unit/Room#: A7U-8533/1166-43  Discharging Unit Phone Number:[phone]  Emergency Contact:   Extended Emergency Contact Information  Primary Emergency Contact: Rendell Bence  Mobile Phone: [phone]  Relation: Spouse  Preferred language: English   needed?  No    Past Surgical History:  Past Surgical History:   Procedure Laterality Date    ILEOSCOPY N/A 2022    ILEOSCOPY DX ONLY performed by Li Garcia DO at [address] [place] [address] 2022    EGD BIOPSY performed by Li Garcia DO at [facility] ENDOSCOPY       Immunization History:   Immunization History   Administered Date(s) Administered    COVID-19, J&J, (age 18y+), IM, 0.5 mL 2021    COVID-19, PFIZER Bivalent BOOSTER, (age 12y+), IM, 30 mcg/0.3 mL dose 2022    COVID-19, PFIZER PURPLE top, DILUTE for use, (age 15 y+), 30mcg/0.3mL 10/25/2021       Active Problems:  Patient Active Problem List   Diagnosis Code    Rectal bleeding K62.5    [facility]le[facility] K94.01       Isolation/Infection:   Isolation            No Isolation          Patient Infection Status       None to display            Nurse Assessment:  Last Vital Signs: /86   Pulse 96   Temp 97.5 °F (36.4 °C) (Oral)   Resp 17   Ht 6' 1\" (1.854 m)   Wt 268 lb 11.9 oz (121.9 kg)   SpO2 95%   BMI 35.46 kg/m²     Last documented pain score (0-10 scale): Pain Level: 2  Last Weight:   Wt Readings from Last 1 Encounters:   22 268 lb 11.9 oz (121.9 kg)     Mental Status:  oriented, alert, and forgetful at times     IV Access:  - None    Nursing Mobility/ADLs:  Walking Assisted  Transfer  Assisted  Bathing  Assisted  Dressing  Assisted  Toileting  Assisted  Feeding  Assisted  Med Admin  Assisted  Med Delivery   whole    Wound Care Documentation and Therapy:        Elimination:  Continence: Bowel: ostomy   Bladder: Yes  Urinary Catheter: None   Colostomy/Ileostomy/Ileal Conduit: Yes  Colostomy LLQ-Stomal Appliance: 2 piece, Clean, dry & intact  Colostomy LLQ-Stoma  Assessment: Unable to assess  Colostomy LLQ-Peristomal Assessment: Unable to assess  Colostomy LLQ-Treatment: Bag change (per pt.)  Colostomy LLQ-Stool Appearance: Other (Comment)  Colostomy LLQ-Stool Color: Yellow, Tan (Comment)  Colostomy LLQ-Stool Amount: Medium  Colostomy LLQ-Output (mL): 0 ml    Date of Last BM: 11/22/2022    Intake/Output Summary (Last 24 hours) at 11/22/2022 1613  Last data filed at 11/21/2022 1722  Gross per 24 hour   Intake 240 ml   Output 200 ml   Net 40 ml     I/O last 3 completed shifts: In: 2200 [P.O.:2200]  Out: 2350 [Urine:2350]    Safety Concerns: At Risk for Falls    Impairments/Disabilities:      Vision and Hearing    Nutrition Therapy:  Current Nutrition Therapy:   - Oral Diet:  Carb Control 4 carbs/meal (1800kcals/day)    Routes of Feeding: Oral  Liquids: Thin Liquids  Daily Fluid Restriction: no  Last Modified Barium Swallow with Video (Video Swallowing Test): not done    Treatments at the Time of Hospital Discharge:   Respiratory Treatments: yes   Oxygen Therapy:  is not on home oxygen therapy.   Ventilator:    - No ventilator support    Rehab Therapies: Physical Therapy and Occupational Therapy  Weight Bearing Status/Restrictions: No weight bearing restrictions  Other Medical Equipment (for information only, NOT a DME order):  walker  Other Treatments: HOME HEALTH CARE: LEVEL 2 [address] [place] [address] to establish plan of care for patient over 60 day period   Nursing  Initial home SN evaluation visit to occur within 24-48 hours for:  1)  medication management  2)  VS and clinical assessment  3)  S&S chronic disease exacerbation education + when to contact MD/NP  4) care coordination  Medication Reconciliation during 1st SN visit  PT/OT - therapy goal is to regaining prior level of functioning   Evaluations in home within 24-48 hours of discharge to include DME and home safety   OT to evaluate if patient has [address] needs for personal care    visit within 24-48 hours to evaluate resources & insurance for potential AL, [place], LTC, and Medicaid options  [place] on Aging Referral    Dietician evaluation within five days of discharge   Family/POA Care Conference to discuss home support and care needs to occur within two weeks of discharge   Telehealth-Homecare Vitals(If patient is agreeable and meets guidelines)      Patient's personal belongings (please select all that are sent with patient):  [place]    RN SIGNATURE:  Electronically signed by Bruce Lei RN on 11/22/22 at 4:51 PM EST    CASE MANAGEMENT/SOCIAL WORK SECTION    Inpatient Status Date: 11/16/2022    Readmission Risk Assessment Score:  Readmission Risk              Risk of Unplanned Readmission:  22         Discharging to Facility/ Agency   Name:  [facility] care    Address: [address] [place] [address], [address] [place] [address] [place] [address]  Phone: [phone]  Fax: [phone]       / signature: Electronically signed by Tiffany Rai on 11/22/22 at 4:13 PM EST    PHYSICIAN SECTION    Prognosis: Good    Condition at Discharge: Stable    Rehab Potential (if transferring to Rehab): Good    Recommended Labs or Other Treatments After Discharge: continue PT OT RN monitoring cirrhosis oxygenation     Physician Certification: I certify the above information and transfer of Daniella Bush  is necessary for the continuing treatment of the diagnosis listed and that he requires [address] for less 30 days.      Update Admission H&P: No change in H&P    PHYSICIAN SIGNATURE:  Electronically signed by Karuna Huber MD on 11/23/22 at 3:55 PM EST

## 2022-11-22 NOTE — PROGRESS NOTES
Pulmonary Progress Note    Date of Admission: 11/16/2022   LOS: 6 days       CC:  Chief Complaint   Patient presents with    Rectal Bleeding     Pt has been bleeding from colostomy for 2 days, pt is pale and diaphoretic, tachy and hypotensive         Subjective: In bed. No complaints today. Feeling better. Assessment:     GERD  Hypertension  Hyperlipidemia  Bipolar  Substance abuse  History of avascular necrosis of the hip  History of rectal cancer  Hepatitis B  HIV  Bullous emphysema PI MM normal levels 2011 2018    Plan: This note may have been transcribed using [id number] [facility]. Please disregard any translational errors. Hospital Day: 6     Abnormal radiograph with hypoxemia  CT consistent with pneumonia  Procalcitonin improving    CTX and Azithromycin. 7 and 5 days total.   HIV controlled. Hypoxemia improving. Weaned to 1 L nasal cannula. Emphysema  CT look similar to bullous emphysema. Versus cyst.  Alpha 1 antitrypsin deficiency negative in 2011 and 2018. Dulera  Wean oxygen to 90% saturation. Weaned to 2 L today. Combivent. No wheezing on today's exam    Likely able to discharge tomorrow. Data:        PHYSICAL EXAM:   Blood pressure 102/60, pulse 97, temperature 97.6 °F (36.4 °C), resp. rate 16, height 6' 1\" (1.854 m), weight 268 lb 11.9 oz (121.9 kg), SpO2 96 %.'  Body mass index is 35.46 kg/m². Gen: No distress. ENT:   Resp: No accessory muscle use. No crackles. No wheezes. No rhonchi. CV: Regular rate. Regular rhythm. No murmur or rub. No edema. Skin: Warm, dry, normal texture and turgor. No nodule on exposed extremities. M/S: No cyanosis. No clubbing. No joint deformity.   Psych: Awake in chair    Medications:    Scheduled Meds:   nicotine  1 patch TransDERmal Daily    raltegravir  400 mg Oral BID    emtricitabine  200 mg Oral Daily    And    Tenofovir Alafenamide Fumarate  25 mg Oral Daily    mometasone-formoterol  2 puff Inhalation BID sodium chloride flush  10 mL IntraVENous 2 times per day    pantoprazole  40 mg IntraVENous Daily    cefTRIAXone (ROCEPHIN) IV  2,000 mg IntraVENous Q24H    clonazePAM  2 mg Oral BID    QUEtiapine  800 mg Oral Nightly    insulin lispro  0-8 Units SubCUTAneous TID WC    insulin lispro  0-4 Units SubCUTAneous Nightly    albuterol-ipratropium  1 puff Inhalation TID       Continuous Infusions:   sodium chloride      sodium chloride 25 mL/hr at 11/18/22 1818    dextrose         PRN Meds:  oxyCODONE, sodium chloride, sodium chloride flush, sodium chloride, promethazine **OR** ondansetron, polyethylene glycol, acetaminophen **OR** acetaminophen, albuterol, albuterol sulfate HFA, glucose, dextrose bolus **OR** dextrose bolus, glucagon (rDNA), dextrose    Labs reviewed:  CBC:   Recent Labs     11/22/22 0519   WBC 3.6*   HGB 8.2*   HCT 24.5*   MCV 90.9   *       BMP:   Recent Labs     11/22/22 0519      K 3.5  3.5      CO2 25   PHOS 3.3   BUN 11   CREATININE 0.6*       LIVER PROFILE:   Recent Labs     11/22/22 0519   AST 71*   *   BILITOT 0.6   ALKPHOS 77       PT/INR:   No results for input(s): PROTIME, INR in the last 72 hours. APTT: No results for input(s): APTT in the last 72 hours. UA:No results for input(s): NITRITE, COLORU, PHUR, LABCAST, WBCUA, RBCUA, MUCUS, TRICHOMONAS, YEAST, BACTERIA, CLARITYU, SPECGRAV, LEUKOCYTESUR, UROBILINOGEN, BILIRUBINUR, BLOODU, GLUCOSEU, AMORPHOUS in the last 72 hours. Invalid input(s): [place]  No results for input(s): PH, PCO2, PO2 in the last 72 hours. Cx:      Films: This note was transcribed using [id number] [facility]. Please disregard any translational errors.       Denita Rowe Pulmonary, Sleep and [facility] [phone]

## 2022-11-22 NOTE — DISCHARGE SUMMARY
Hospital Medicine Discharge Summary    Patient ID: Carolyn Bartlett      Patient's PCP: Parviz Restrepo. Jarrod Clifford MD, MD    Admit Date: 11/16/2022     Discharge Date: 11/23/2022 11/23/22    Admitting Provider: Araceli Rowley MD     Discharge Provider: Marcie Burnett MD     Discharge Diagnoses: Active Hospital Problems    Diagnosis     Bleeding from colostomy stoma ([address] [K94.01]      Priority: Medium    Rectal bleeding [K62.5]      Priority: Medium   Cough  Pleuritic chest pain  Fever  LLL pneumonia  Sepsis due to pneumonia  Tobacco dependence  Bullous emphyesema  Wheezing  Tachycardia  Acute hypoxic respiratory failure  Acute blood loss Anemia  Bleeding from stoma site-resolved  Hemorrhagic shock   Portacath right chest wall  Cirrhosis liver  Chronic Hepatitis B  HIV  Previous IVDA cocaine and meth  Elevated transaminases   Hx APR diverting colostomy   Hx HPV related squamous cell anal cancer  Hyponatremia  Obesity BMI 34.9     Acute renal failure  Chronic right hydronephrosis  Urethral stricture status post urethral dilation  Portal gastropathy  Several duodenal ulcers       The patient was seen and examined on day of discharge and this discharge summary is in conjunction with any daily progress note from day of discharge. Hospital Course:   71-year-old male with past medical history of HIV, hepatitis B cirrhosis, had a wide perineal resection and APR with diverting colostomy due to a HPV related squamous cell anal cancer, diabetes, chronic hepatitis B, HIV, fatty liver disease with cirrhosis followed by Dr. Didi Zurita [facility]. His HIV is well controlled on antiretroviral therapy. on tenofovir for his hepatitis B. He has followed with infectious disease and has had an undetectable viral load and a good CD4 count. The patient has had a diverting colostomy since 2001. He has been followed by Dr. Bettye Walden. Hypertension. presents to hospital for seeing pulsatile maroon blood from colostomy x48 hours.   felt dizzy lightheaded Denied fevers chills diarrhea. Seen by GI had EGD w bx and ileoscopy 11/17/22. There was some ulceration at os stoma. EGD w bx and ileoscopy 11/17/22  Postoperative Diagnosis:     esophagus normal  No varices  GE junction 41 cm from the incisors. No gastric varices. moderate portal gastropathy. Mild erythema of the antrum. Biopsies obtained from antrum and body of stomach to rule out gastritis and H.pylori. pre-pyloric ulcerations. No bleeding stigmata were noted. several clean based ulcers  in duodenal bulb and duodenal sweep. second portion of the duodenum had bile noted. No signs of bleeding were noted. There was a reactive appearing nodule with ulceration noted at the os of the ileostomy. There was no active bleeding. Ileal mucosa was normal.      No further bleeding downgraded from ICU to floor 11/18/22. He also had difficulty voiding was a difficult Reece insertion so urology was called underwent cystoscopy with urethral dilation 11/16/2022. Procedures:  DATE OF PROCEDURE:  11/16/2022  PREOPERATIVE DIAGNOSES:  Difficult Reece, gross hematuria. POSTOPERATIVE DIAGNOSES:  Ureteral stricture, gross hematuria, difficult Reece. OPERATION PERFORMED:  Cystoscopy with urethral dilation. SURGEON:  Jhonny Forbes. Sharmila Henderson MD   ANESTHESIA:  Local.   FINDINGS:  Dense penile bulbar urethral stricture, which was dilated  from 12-Gibraltarian up to 18-Gibraltarian and then a 16-Gibraltarian [place]-tip catheter was placed. He was voiding at discharge working with PT OT offered acute inpatient rehab patient declined feeling that he has equipment at home with home health care assistance he can manage. Weaned to room air. Hgb 8.2 at discharge. Completed a course of antibiotics for left lower lobe pneumonia flu and COVID-negative seen by pulmonology given inhalers has bullous emphysema seen on CT scan. Continued his usual home HIV meds can follow-up with Dr. Saadia Serrano at [facility] for his liver disease.         Physical Exam Performed:     /65   Pulse (!) 105   Temp 97.4 °F (36.3 °C) (Oral)   Resp 16   Ht 6' 1\" (1.854 m)   Wt 271 lb 6.2 oz (123.1 kg)   SpO2 93%   BMI 35.81 kg/m²   Gen: No distress. obese conversant wife at bedside has walker at home  HEENT  mucosa moist goatee  EYES: PERRL EOMI anicteric  Resp: At baseline has mild wheezing no dyspnea on room air   CV: RRR No murmur or rub. Neck left IJ TLC REMOVED THIS MORNING. supple no JVD   Extremity No edema no clubbing or cyanosis  Skin no jaundice slightly pale, no rash  Abdomen obese NT ND soft  ostomy LLQ stool and gas  M/S: alert Oriented x 3    voids clear yellow urinal     Labs: For convenience and continuity at follow-up the following most recent labs are provided:      CBC:    Lab Results   Component Value Date/Time    WBC 3.6 11/22/2022 05:19 AM    HGB 8.2 11/22/2022 05:19 AM    HCT 24.5 11/22/2022 05:19 AM     11/22/2022 05:19 AM       Renal:    Lab Results   Component Value Date/Time     11/22/2022 05:19 AM    K 3.5 11/22/2022 05:19 AM    K 3.5 11/22/2022 05:19 AM     11/22/2022 05:19 AM    CO2 25 11/22/2022 05:19 AM    BUN 11 11/22/2022 05:19 AM    CREATININE 0.6 11/22/2022 05:19 AM    CALCIUM 8.2 11/22/2022 05:19 AM    PHOS 3.3 11/22/2022 05:19 AM         Significant Diagnostic Studies    Radiology:   XR CHEST (2 VW)   Final Result   Left perihilar consolidation/pneumonia with new left lower lobe infiltrate   compared with prior exam.      Lines in place as described above. CT CHEST WO CONTRAST   Final Result   1. Patchy confluent airspace opacities in the left upper lobe which likely   reflect pneumonia. 2. Bullous emphysema. 3. Left internal jugular venous catheter extending to the central aspect of   the left brachiocephalic vein and right subclavian Port-A-Cath with a loop at   the central aspect of the subclavian vein. 4. Small pericardial effusion. 5. Hepatic cirrhosis and at least mild splenomegaly. XR CHEST PORTABLE   Final Result   Bilateral central bronchial thickening with left perihilar pneumonia. Left IJ central venous catheter terminates in the central left   brachiocephalic vein. A right-sided chest port is identified, with the mid aspect of the catheter   seen coiled overlying the region of the central right subclavian   vein/innominate vein. US ABDOMEN COMPLETE   Final Result   Marked right hydronephrosis and marked right renal parenchymal atrophy. RECOMMENDATIONS:   Unavailable                Consults:     IP CONSULT TO GENERAL SURGERY  IP CONSULT TO GENERAL SURGERY  IP CONSULT TO UROLOGY  IP CONSULT TO GI  IP CONSULT TO NEPHROLOGY  IP CONSULT TO CRITICAL CARE  IP CONSULT TO CRITICAL CARE  IP CONSULT TO PHYSICAL MEDICINE REHAB  IP CONSULT TO HOME CARE NEEDS    Disposition: Home with [facility] RN PT OT    Condition at Discharge: Stable    Discharge Instructions/Follow-up: Dr. Eyal Pan  PCP    Code Status:  Prior     Activity: activity as tolerated    Diet: diabetic diet      Discharge Medications: Will remove the subcu lispro those were added to act as a sliding scale in anticipation of him going to inpatient rehab as he is going home he does not need to continue those  No pain meds were prescribed at discharge  Magnesium oxide not prescribed as well  He may otherwise continue the previous home meds listed below these    Discharge Medication List as of 11/23/2022  8:40 AM             Details   oxyCODONE (ROXICODONE) 5 MG immediate release tablet Take 0.5 tablets by mouth every 8 hours as needed for Pain for up to 3 days. , Disp-6 tablet, R-0NO PRINT      magnesium oxide (MAG-OX) 400 (240 Mg) MG tablet Take 1 tablet by mouth 2 times daily for 3 doses, Disp-60 tablet, R-0NO PRINT      !! insulin lispro (HUMALOG) 100 UNIT/ML SOLN injection vial Inject 0-8 Units into the skin 3 times daily (with meals), Disp-10 mL, R-0NO PRINT      !! insulin lispro (HUMALOG) 100 UNIT/ML SOLN injection vial Inject 0-4 Units into the skin nightly, Disp-10 mL, R-0NO PRINT       ! ! - Potential duplicate medications found. Please discuss with provider. Details   fluticasone-vilanterol (BREO ELLIPTA) 100-25 MCG/INH AEPB inhaler Inhale 2 puffs into the lungs dailyHistorical Med      Dulaglutide (TRULICITY) 3 GV/5.0TH SOPN Inject 3 mg into the skin once a weekHistorical Med      albuterol sulfate HFA (PROVENTIL;VENTOLIN;PROAIR) 108 (90 Base) MCG/ACT inhaler Inhale 2 puffs into the lungs every 6 hours as needed for Wheezing or Shortness of BreathHistorical Med      dapagliflozin (FARXIGA) 10 MG tablet Take 10 mg by mouth every morningHistorical Med      emtricitabine-tenofovir alafenamide (DESCOVY) 200-25 MG TABS tablet Take 1 tablet by mouth dailyHistorical Med      albuterol-ipratropium (COMBIVENT RESPIMAT)  MCG/ACT AERS inhaler Inhale 1 puff into the lungs in the morning and 1 puff at noon and 1 puff in the evening and 1 puff before bedtime. Historical Med      QUEtiapine (SEROQUEL XR) 400 MG extended release tablet Take 800 mg by mouth nightlyHistorical Med      raltegravir (ISENTRESS) 400 MG tablet Take 400 mg by mouth 2 times dailyHistorical Med      clonazePAM (KLONOPIN) 2 MG tablet Take 2 mg by mouth in the morning and at bedtime. Historical Med      metFORMIN (GLUCOPHAGE-XR) 500 MG extended release tablet Take 1,000 mg by mouth 2 times dailyHistorical Med      tadalafil (CIALIS) 5 MG tablet Take 5 mg by mouth as needed for Erectile DysfunctionHistorical Med      ondansetron (ZOFRAN-ODT) 4 MG disintegrating tablet Take 4 mg by mouth every 8 hours as needed for Nausea or VomitingHistorical Med             Time Spent on discharge: 50 in the examination, evaluation, counseling and review of medications and discharge plan. Signed:    Princess Majano MD   11/23/2022      Thank you En Marroquin MD, MD for the opportunity to be involved in this patient's care.  If you have any questions or concerns, please feel free to contact me at [phone].

## 2022-11-22 NOTE — PROGRESS NOTES
[facility]                                               Progress note    CC: bleeding colostomy stoma  Summary:   Mihaela Sinha is being seen by nephrology for Acute kidney injury (DEISI). He is a 47 y.o. male with a PMH significant for HIV, HBV with cirrhosis, hypertension who presented to the ED 11/16/2022 with bleeding from his colostomy stoma. Bleeding had been ongoing for 2 days. He was feeling dizzy and lightheaded so he cam to the ED. HE was noted to have a hemoglobin of 10.7, but a Cr of 1.9, and he was severely hypotensive as low as 68/57. He had also been complaining of hematuria but harris placement was difficult so he had to undergo cystoscopic urethral dilation and harris placement. Interval History  Patient was seen and examined at bedside  He is awake and alert sitting up in the chair  He still very wheezy but his breathing effort looks much easier. He has no edema no chest pain or shortness of breath right now  He is planning to discharge home tomorrow    Blood pressure 125/86  Satting well on 2 L  Urine output 1.6 L    Plan:   Renal function is back to baseline  There are no acute electrolyte abnormalities but does need magnesium replacement for 1.6  His corrected calcium is near normal  His blood pressure is acceptable and he appears euvolemic  Will sign off    Kristine Ribeiro MD  [facility]  Office: [phone]    Assessment:   DEISI:  - baseline Cr 1.0-1.2  - Cr at time of admission was 1.9, associated with bleeding from the stoma, hypotension  - also had urinary retention due to urethral stricture which was dilated cystoscopically by urology upon presentation to the ED due to difficulty placing a harris. - DEISI likely 2/2 hypotension, hemodynamic insult. Acute metabolic acidosis:  - 2/2 ARF and hypotension, improving     Chronic right sided hydronephrosis:  - likely has a solitary functioning left kidney  - Hydronephrosis was present on US in 2021 also.      Bleeding stoma:  - per GI and gen-surg.  - s/p EGD 11/17/2022, mild portal gastropathy, pre-pyloric ulcerations, clean based ulcers in the duodenal bulb, no signs of active bleeding.  - ileoscopy was also done showing reactive appearing nodule with ulceration at the os of the ostomy. ROS:     Positives Listed Bold. All other remaining systems are negative. Constitutional:  fever, chills, weakness, weight change, fatigue,      Skin:  rash, pruritus, hair loss, bruising, dry skin, petechiae. Head, Face, Neck   headaches, swelling,  cervical adenopathy. Respiratory: shortness of breath, cough, or wheezing  Cardiovascular: chest pain, palpitations, dizzy, edema  Gastrointestinal: nausea, vomiting, diarrhea, constipation,belly pain    Yellow skin, blood in stool  Musculoskeletal:  back pain, muscle weakness, gait problems,       joint pain or swelling. Genitourinary:  dysuria, poor urine flow, flank pain, blood in urine  Neurologic:  vertigo, TIA'S, syncope, seizures, focal weakness  Psychosocial:  insomnia, anxiety, or depression. Additional positive findings: -     PMH:   Past medical history, surgical history, social history, family history are reviewed and updated as appropriate. Reviewed current medication list.   Allergies reviewed and updated as needed. PE:   Vitals:    11/22/22 1134   BP: 125/86   Pulse: 96   Resp: 17   Temp: 97.5 °F (36.4 °C)   SpO2: 94%       General appearance: in NAD, lethargic. Comfortable. HEENT: EOM intact, no icterus. Trachea is midline. Neck : No masses, appears symmetrical, no JVD  Respiratory: Respiratory effort appears normal, bilateral equal chest rise, + wheezes diffusely   Cardiovascular: Ausculation shows RRR trace edema  Abdomen: No visible mass or tenderness, non distended. Musculoskeletal:  Joints with no swelling or deformity. Skin:no rashes, ulcers, induration, no jaundice. Neuro: face symmetric, no focal deficits. Appropriate responses.        Lab Results   Component Value Date    CREATININE 0.6 (L) 11/22/2022    BUN 11 11/22/2022     11/22/2022    K 3.5 11/22/2022    K 3.5 11/22/2022     11/22/2022    CO2 25 11/22/2022      Lab Results   Component Value Date    WBC 3.6 (L) 11/22/2022    HGB 8.2 (L) 11/22/2022    HCT 24.5 (L) 11/22/2022    MCV 90.9 11/22/2022     (L) 11/22/2022     Lab Results   Component Value Date    CALCIUM 8.2 (L) 11/22/2022    PHOS 3.3 11/22/2022

## 2022-11-22 NOTE — CARE COORDINATION
[facility]    Referral received from  to follow for home care services. I will follow for needs, and speak with patient to verify demos.     Felisa David RN, BSN CTN  [facility] [phone]

## 2022-11-22 NOTE — CARE COORDINATION
DISCHARGE SUMMARY     DATE OF DISCHARGE: 11/22/2022    DISCHARGE DESTINATION: home      HOME CARE AGENCY: Discharging to Facility/ Agency   Name:  [facility] care    Address: [address] [place] [address]., [facility] [place]  Phone: [phone]  Fax: [phone]     DME ORDERED: none      TRANSPORTATION:   girlfriend    COMMENTS: Spoke with patient regarding recommendation for acute rehab. Patient refuses this option. He stated he only wants to go home. He lives with his girlfriend of 27 years ,and she will help him as needed. Patient did agree to home care; list provided. Patient agreeable to [facility]. Demographics confirmed. Referral made to [facility].     Electronically signed by LORETTA Linn, SHAN, Case Management 11/22/2022 at 4:19 PM  [phone]

## 2022-11-23 VITALS
SYSTOLIC BLOOD PRESSURE: 117 MMHG | RESPIRATION RATE: 16 BRPM | WEIGHT: 271.39 LBS | DIASTOLIC BLOOD PRESSURE: 65 MMHG | OXYGEN SATURATION: 93 % | HEART RATE: 105 BPM | HEIGHT: 73 IN | TEMPERATURE: 97.4 F | BODY MASS INDEX: 35.97 KG/M2

## 2022-11-23 LAB
GLUCOSE BLD-MCNC: 176 MG/DL (ref 70–99)
PERFORMED ON: ABNORMAL

## 2022-11-23 PROCEDURE — 6370000000 HC RX 637 (ALT 250 FOR IP): Performed by: INTERNAL MEDICINE

## 2022-11-23 PROCEDURE — 94640 AIRWAY INHALATION TREATMENT: CPT

## 2022-11-23 PROCEDURE — 2580000003 HC RX 258: Performed by: INTERNAL MEDICINE

## 2022-11-23 PROCEDURE — 6370000000 HC RX 637 (ALT 250 FOR IP): Performed by: STUDENT IN AN ORGANIZED HEALTH CARE EDUCATION/TRAINING PROGRAM

## 2022-11-23 PROCEDURE — 94760 N-INVAS EAR/PLS OXIMETRY 1: CPT

## 2022-11-23 PROCEDURE — C9113 INJ PANTOPRAZOLE SODIUM, VIA: HCPCS | Performed by: INTERNAL MEDICINE

## 2022-11-23 PROCEDURE — 6360000002 HC RX W HCPCS: Performed by: INTERNAL MEDICINE

## 2022-11-23 RX ADMIN — MAGNESIUM OXIDE 400 MG (241.3 MG MAGNESIUM) TABLET 400 MG: TABLET at 01:15

## 2022-11-23 RX ADMIN — Medication 2 PUFF: at 08:15

## 2022-11-23 RX ADMIN — RALTEGRAVIR 400 MG: 400 TABLET, FILM COATED ORAL at 08:57

## 2022-11-23 RX ADMIN — PANTOPRAZOLE SODIUM 40 MG: 40 INJECTION, POWDER, FOR SOLUTION INTRAVENOUS at 08:59

## 2022-11-23 RX ADMIN — EMTRICITABINE 200 MG: 200 CAPSULE ORAL at 08:58

## 2022-11-23 RX ADMIN — CLONAZEPAM 2 MG: 1 TABLET ORAL at 08:58

## 2022-11-23 RX ADMIN — MAGNESIUM OXIDE 400 MG (241.3 MG MAGNESIUM) TABLET 400 MG: TABLET at 08:58

## 2022-11-23 RX ADMIN — SODIUM CHLORIDE, PRESERVATIVE FREE 10 ML: 5 INJECTION INTRAVENOUS at 08:59

## 2022-11-23 RX ADMIN — IPRATROPIUM BROMIDE AND ALBUTEROL 1 PUFF: 20; 100 SPRAY, METERED RESPIRATORY (INHALATION) at 08:15

## 2022-11-23 NOTE — PROGRESS NOTES
Patient is now ready for discharge, discharge information discussed with patient and family member (with permission). L IJ was removed by this RN per [facility] policy, pressure held for 5 minutes and patient instructed to lay flat for 30 minutes, patient agreeable. After thirty minutes, no signs of bleeding or hematoma. Waiting for MD to round and then patient will be discharged. All needs meet at this time, family member here to transport the patient home.      Electronically signed by Gretchen Ruby RN on 11/23/2022 at 10:19 AM

## 2022-11-23 NOTE — PROGRESS NOTES
Hospitalist Progress Note      PCP: Deborah García. Clarence Ziegler MD, MD    Date of Admission: 11/16/2022    Chief Complaint: bleeding from colostomy    Hospital Course:  58-year-old male with past medical history of HIV, hepatitis B cirrhosis, had a wide perineal resection and APR with diverting colostomy due to a HPV related squamous cell anal cancer, diabetes, chronic hepatitis B, HIV, fatty liver disease with cirrhosis followed by Dr. Allison Cardona at [facility]. His HIV is well controlled on antiretroviral therapy. on tenofovir for his hepatitis B. He has followed with infectious disease and has had an undetectable viral load and a good CD4 count. The patient has had a diverting colostomy since 2001. He has been followed by Dr. Soraya Mosley. Hypertension. presents to the hospital for reported seeing pulsatile maroon blood from colostomy. According to patient he has been bleeding for past 48 hours. He mentions he feels dizzy lightheaded he mentions his bleeding has been slowing down in the past 48 hours. Denied fevers chills diarrhea. Seen by GI had EGD w bx and ileoscopy 11/17/22. There was some ulceration at os stoma. EGD w bx and ileoscopy 11/17/22  Postoperative Diagnosis:   1) The esophagus was normal without evidence of esophagitis, Sainz's esophagus, strictures, rings, furrowing, masses, or nodules. No varices were noted. GE junction was at 41 cm from the incisors. 2) No gastric varices were noted. There was moderate portal gastropathy noted. 3) Mild erythema of the antrum. Biopsies were obtained from the antrum and body of the stomach to rule out active gastritis and H.pylori gastritis. 4) There were pre-pyloric ulcerations noted. No bleeding stigmata were noted. 5) THere were several clean based ulcers noted in the duodenal bulb and duodenal sweep. 6) The second portion of the duodenum had bile noted. No signs of bleeding were noted.   7) There was a reactive appearing nodule with ulceration noted at the os of the ileostomy. There was no active bleeding. 8) Ileal mucosa was normal.  Brown stool was noted    Findings:     1) The esophagus was normal without evidence of esophagitis, Sainz's esophagus, strictures, rings, furrowing, masses, or nodules. No varices were noted. GE junction was at 41 cm from the incisors. 2) No gastric varices were noted. There was moderate portal gastropathy noted. 3) Mild erythema of the antrum. Biopsies were obtained from the antrum and body of the stomach to rule out active gastritis and H.pylori gastritis. 4) There were pre-pyloric ulcerations noted. No bleeding stigmata were noted. 5) THere were several clean based ulcers noted in the duodenal bulb and duodenal sweep. 6) The second portion of the duodenum had bile noted. No signs of bleeding were noted. The scope was then withdrawn back into the stomach, it was decompressed, and the scope was completely withdrawn. Then, an upper endoscope was introduced into the ileoscopy site. There was a reactive appearing nodule with ulceration noted at the os of the ileostomy. There was no active bleeding. Ileal mucosa was normal.  Brown stool was noted. No further bleeding downgraded from ICU to floor 11/18/22    Procedures:  DATE OF PROCEDURE:  11/16/2022  PREOPERATIVE DIAGNOSES:  Difficult Reece, gross hematuria. POSTOPERATIVE DIAGNOSES:  Ureteral stricture, gross hematuria, difficult Reece. OPERATION PERFORMED:  Cystoscopy with urethral dilation. SURGEON:  David Obrien. Glen Muniz MD   ANESTHESIA:  Local.   FINDINGS:  Dense penile bulbar urethral stricture, which was dilated  from 12-Bhutanese up to 18-Bhutanese and then a 16-Bhutanese Rampart-tip catheter was placed. Subjective: coughed up thick sl blood tinged dark yellow chunk of sputum.  [place] Nose takes his O2 on and off   all other systems neg       Medications:  Reviewed    Infusion Medications    sodium chloride      sodium chloride 25 mL/hr at 11/18/22 3236 dextrose       Scheduled Medications    magnesium oxide  400 mg Oral BID    nicotine  1 patch TransDERmal Daily    raltegravir  400 mg Oral BID    emtricitabine  200 mg Oral Daily    And    Tenofovir Alafenamide Fumarate  25 mg Oral Daily    mometasone-formoterol  2 puff Inhalation BID    sodium chloride flush  10 mL IntraVENous 2 times per day    pantoprazole  40 mg IntraVENous Daily    cefTRIAXone (ROCEPHIN) IV  2,000 mg IntraVENous Q24H    clonazePAM  2 mg Oral BID    QUEtiapine  800 mg Oral Nightly    insulin lispro  0-8 Units SubCUTAneous TID WC    insulin lispro  0-4 Units SubCUTAneous Nightly    albuterol-ipratropium  1 puff Inhalation TID     PRN Meds: oxyCODONE, sodium chloride, sodium chloride flush, sodium chloride, promethazine **OR** ondansetron, polyethylene glycol, acetaminophen **OR** acetaminophen, albuterol, albuterol sulfate HFA, glucose, dextrose bolus **OR** dextrose bolus, glucagon (rDNA), dextrose      Intake/Output Summary (Last 24 hours) at 11/22/2022 1938  Last data filed at 11/22/2022 1831  Gross per 24 hour   Intake 900 ml   Output --   Net 900 ml       Physical Exam Performed:    BP (!) 169/119   Pulse (!) 139   Temp 97.6 °F (36.4 °C) (Oral)   Resp 18   Ht 6' 1\" (1.854 m)   Wt 268 lb 11.9 oz (121.9 kg)   SpO2 93%   BMI 35.46 kg/m²   Gen: No distress. obese conversant up in chair for meal   HEENT  mucosa moist goatee  EYES: PERRL EOMI anicteric  Resp: O2 3L NC, mild wheezing throughout no dyspnea  CV: RRR No murmur or rub. sl wheeze minimal occasional harsh moist cough   Neck left IJ TLC CDI supple no JVD   Extremity No edema   Skin no jaundice no pallor    Abdomen obese NT ND soft  ostomy LLQ stool and gas  M/S: alert Oriented x 3    voids clear yellow urinal         Labs:   Recent Labs     11/22/22  0519   WBC 3.6*   HGB 8.2*   HCT 24.5*   *     Recent Labs     11/22/22  0519      K 3.5  3.5      CO2 25   BUN 11   CREATININE 0.6*   CALCIUM 8.2*   PHOS 3.3 Recent Labs     11/22/22  0519   AST 71*   *   BILITOT 0.6   ALKPHOS 77     No results for input(s): INR in the last 72 hours. No results for input(s): Soraya Nasuti in the last 72 hours. Urinalysis:    No results found for: Kristen Solar, BACTERIA, RBCUA, BLOODU, [address] [place] [address] [place] [address]    Radiology:  XR CHEST (2 VW)   Final Result   Left perihilar consolidation/pneumonia with new left lower lobe infiltrate   compared with prior exam.      Lines in place as described above. CT CHEST WO CONTRAST   Final Result   1. Patchy confluent airspace opacities in the left upper lobe which likely   reflect pneumonia. 2. Bullous emphysema. 3. Left internal jugular venous catheter extending to the central aspect of   the left brachiocephalic vein and right subclavian Port-A-Cath with a loop at   the central aspect of the subclavian vein. 4. Small pericardial effusion. 5. Hepatic cirrhosis and at least mild splenomegaly. XR CHEST PORTABLE   Final Result   Bilateral central bronchial thickening with left perihilar pneumonia. Left IJ central venous catheter terminates in the central left   brachiocephalic vein. A right-sided chest port is identified, with the mid aspect of the catheter   seen coiled overlying the region of the central right subclavian   vein/innominate vein. US ABDOMEN COMPLETE   Final Result   Marked right hydronephrosis and marked right renal parenchymal atrophy.       RECOMMENDATIONS:   Unavailable             IP CONSULT TO GENERAL SURGERY  IP CONSULT TO GENERAL SURGERY  IP CONSULT TO UROLOGY  IP CONSULT TO GI  IP CONSULT TO NEPHROLOGY  IP CONSULT TO CRITICAL CARE  IP CONSULT TO CRITICAL CARE  IP CONSULT TO PHYSICAL MEDICINE REHAB  IP CONSULT TO HOME CARE NEEDS    Assessment/Plan:    Active Hospital Problems    Diagnosis     Bleeding from colostomy stoma ([facility] [address] [K94.01]      Priority: Medium    Rectal bleeding [K62.5]      Priority: Medium Cough  Pleuritic chest pain  Fever  LLL pneumonia  Sepsis due to pneumonia  Flu/covid neg  Zithro completing/rocephin   Tobacco dependence  Bullous emphyesema  Wheezing  Tachycardia  Acute hypoxic respiratory failure  O2 3L NC  dulera  Combivent inhaler TID  Pulmonary following   --1ppd  Acute blood loss Anemia  Bleeding from stoma site-resolved  Hemorrhagic shock   Hgb stable 8.9  Iron 46, TIBC 213 %sat 22  Seen by GI general surgery  EGD 11/17/22 mild portal gastropathy, pre-pyloric ulcerations, clean based ulcers in the duodenal bulb, no signs of active bleeding.  - ileoscopy was also done showing reactive appearing nodule with ulceration at the os of the ostomy  Portacath right chest wall  Cirrhosis liver  Chronic Hepatitis B  HIV  Previous IVDA cocaine and meth  Elevated transaminases   TLC dc prior to dc   AFP tumor marker neg 1.1   fatty liver disease with cirrhosis followed by Dr. Buddy Crawley at [address] HIV meds emtriva raltegravir    Hx APR diverting colostomy   Hx HPV related squamous cell anal cancer  Hyponatremia  na 132    Urine na <20, urine osmo 342 not c/w SIADH. Better after off IVF  Will recheck baseline labs tomorrow   Obesity BMI 34.9    Acute renal failure  Chronic right hydronephrosis  Urethral stricture   --urology consulted for harris but felt dense stricture in penile urethra so cystoscopy w urethral dilation 11/16/22  --seen by nephrology suspect likely has solitary fxing kidney   Avoid reinsert harris  I recommended nursing sit him up or stand to void      On RA  Consider repeat home o2 eval in am if needed  Plan wsAIPR today he agreed but then he disagreed again. Dw wife pt refuses rehab plan [facility] RN PT OT in am  He initially wanted to even refuse that but stressed he needs the extra rehab so reluctantly agreed  Should complete abx so likley no need on dc  Dc TLC in am      DVT Prophylaxis: scd  Diet: ADULT DIET;  Regular; 4 carb choices (60 gm/meal)  Code Status: Full Code  PT/OT Eval Status: ordered    Dispo - home w wife Need HHC home o2 eval may need to be repeated ?  Isa Thurman MD

## 2022-11-23 NOTE — PROGRESS NOTES
Department of Physical Medicine & Rehabilitation  Progress Note    Patient Identification:  Yony Enriquez  [id number]  : 1970  Admit date: 2022    Chief Complaint: Rectal bleeding    Subjective:   No acute events overnight. Patient continues to feel he does not need inpatient rehab. Plans to return home with significant other. States he has a lot of exercise equipment and exercises from prior outpatient PT. Labs reviewed. ROS: No f/c, n/v, cp     Objective:  Patient Vitals for the past 24 hrs:   BP Temp Temp src Pulse Resp SpO2 Weight   22 0810 -- -- -- (!) 105 16 93 % --   22 0733 117/65 97.4 °F (36.3 °C) Oral (!) 102 14 94 % --   22 4622 -- -- -- -- -- -- 271 lb 6.2 oz (123.1 kg)   22 0517 -- -- -- -- -- 90 % --   22 0429 104/71 97.7 °F (36.5 °C) Oral (!) 105 16 93 % --   22 0031 (!) 97/53 97.8 °F (36.6 °C) -- (!) 106 16 91 % --   22 2104 -- -- -- -- -- 96 % --   22 -- -- -- (!) 110 20 96 % --   22 1916 (!) 169/119 97.6 °F (36.4 °C) Oral (!) 139 18 93 % --   22 1647 108/65 97.7 °F (36.5 °C) Oral (!) 109 16 96 % --   22 1605 -- -- -- -- -- 95 % --   22 1134 125/86 97.5 °F (36.4 °C) Oral 96 17 94 % --     Const: Alert. No distress, pleasant. HEENT: Normocephalic, atraumatic. Normal sclera/conjunctiva. MMM. CV: Regular rate and rhythm. Resp: No respiratory distress. Abd: Soft, nontender, nondistended, NABS+   Ext: No edema. Neuro: Alert, oriented, appropriately interactive. Psych: Cooperative, appropriate mood and affect    Laboratory data: Available via EMR.    Last 24 hour lab  Recent Results (from the past 24 hour(s))   POCT Glucose    Collection Time: 22 11:03 AM   Result Value Ref Range    POC Glucose 259 (H) 70 - 99 mg/dl    Performed on ACCU-CHEK    POCT Glucose    Collection Time: 22  4:29 PM   Result Value Ref Range    POC Glucose 294 (H) 70 - 99 mg/dl    Performed on ACCU-CHEK    POCT Glucose    Collection Time: 11/22/22  8:20 PM   Result Value Ref Range    POC Glucose 216 (H) 70 - 99 mg/dl    Performed on ACCU-CHEK    POCT Glucose    Collection Time: 11/23/22  6:32 AM   Result Value Ref Range    POC Glucose 176 (H) 70 - 99 mg/dl    Performed on ACCU-CHEK        Therapy progress:  Physical therapy:  Bed Mobility:     Sit>supine:     Supine>sit:     Transfers:     Sit>stand:     Stand>sit:     Bed<>chair     Stand Pivot:     Lateral transfer:     Car transfer:     Ambulation:     Stairs:     Curb: Wheelchair:     Assessment:  Activity Tolerance: Patient limited by fatigue, Patient limited by endurance  Discharge Recommendations: Continue to assess pending progress, Patient would benefit from continued therapy after discharge (home with HHPT and 24 hr assist vs. 5-7x/wk)      Occupational therapy:   Feeding     Grooming/Oral Hygiene     UE Bathing     LE Bathing     UE Dressing     LE Dressing     Putting On/Taking Off Footwear     Toileting     Assessment:  Activity Tolerance: Patient limited by fatigue, Patient limited by endurance  Discharge Recommendations: 5-7 sessions per week, Patient would benefit from continued therapy after discharge    Speech therapy:            PT  Position Activity Restriction  Other position/activity restrictions: O2 at 1 liter   telemonitor  Objective     Sit to Stand: Minimal Assistance  Stand to Sit: Contact guard assistance  Device: Rolling Walker  Other Apparatus: O2  Assistance: Contact guard assistance  Distance: 10 feet to bathoom sink and once complete with activity at the sink 20 feet to the recliner  OT  PT Equipment Recommendations  Equipment Needed: No  Other: continue to assess     Assessment        SLP          Body mass index is 35.81 kg/m².     Assessment:  Debility  Acute blood loss anemia  GI bleed - EGD and ileoscopy (11/17): portal gastropathy, pre-pyloric ulcerations, clean based ulcers in the duodenal bulb, reactive appearing nodule with ulceration at the os of the ostomy, no active bleeding. H/o HPV related squamous cell anal cancer s/p wide perineal resection and diverting ileostomy (2001)  Pneumonia  DEISI  Chronic R hydronephrosis  Urethral stricture s/p dilation (11/16)  Acute hypoxic respiratory failure  COPD, tobacco use disorder  HIV+  Hep B cirrhosis  Prior polysubstance abuse     Impairments: generalized weakness, decreased endurance, balance     Recommendations:     Patient continues to refuse ARU and plans to dc to home. He feels he will be able to rehabilitate himself there. I recommended HH PT, OT and strongly advised that he not use his exercise equipment without supervision. Thank you for this consult. Please contact me with any questions or concerns. Abhishek Kurtz.  Teddy Montiel MD 11/23/2022, 10:51 AM

## 2022-11-23 NOTE — PROGRESS NOTES
Comprehensive Nutrition Assessment    Type and Reason for Visit:  Initial, RD Nutrition Re-Screen/LOS    Nutrition Recommendations/Plan:   Continue CCC (4) diet     Malnutrition Assessment:  Malnutrition Status:  No malnutrition (11/23/22 1041)    Context:  Acute Illness         Nutrition Assessment:    Pt screened for LOS. PMH includes; colostomy (2001 from HPV related anal cancer), DM, HIV, Hep B, Cirrhosis. Pt adm r/t bleeding from ostomy. GI on board with findings of portal gastropathy, pre-pyloric duodenal bulb ulceration and ulceration at stoma. Pt also noted with Pneumonia. Diet adv to [facility] (4). Pt reported and records confirm good po intake at %. Pt with no questions regarding nutrition regimen at this time. Nutrition Related Findings:    Labs reviewed. Noted trace edema to BLE. Noted output per colostomy. Wound Type: None       Current Nutrition Intake & Therapies:    Average Meal Intake: 51-75%, %     ADULT DIET; Regular; 4 carb choices (60 gm/meal)    Anthropometric Measures:  Height: 6' 1\" (185.4 cm)  Ideal Body Weight (IBW): 184 lbs (84 kg)    Admission Body Weight: 243 lb (110.2 kg)  Current Body Weight: 271 lb (122.9 kg),   IBW.  Weight Source: Bed Scale  Current BMI (kg/m2): 35.8                          BMI Categories: Obese Class 2 (BMI 35.0 -39.9)        Nutrition Diagnosis:   No nutrition diagnosis at this time     Nutrition Interventions:   Food and/or Nutrient Delivery: Continue Current Diet  Nutrition Education/Counseling: Education declined  Coordination of Nutrition Care: Continue to monitor while inpatient       Goals:     Goals: PO intake 50% or greater       Nutrition Monitoring and Evaluation:   Behavioral-Environmental Outcomes: None Identified  Food/Nutrient Intake Outcomes: Food and Nutrient Intake  Physical Signs/Symptoms Outcomes: Biochemical Data, Constipation, Diarrhea, Fluid Status or Edema, Weight, Skin    Discharge Planning:    Continue current diet     Maged Heard SERG, MIGNON  Contact: [phone]

## 2022-11-23 NOTE — PROGRESS NOTES
Pt AAO x4. C/o pain in left flank/side area 7/10 on pain scale. Medicated with PRN Oxycodone. Assessment completed. SAN noted when pt ambulated from the chair to the bed. O2 sats 96% on RA. Expiratory wheezing noted. Pt denies any needs. Call light in reach. Will continue to monitor.

## 2022-11-23 NOTE — PLAN OF CARE
Problem: Discharge Planning  Goal: Discharge to home or other facility with appropriate resources  11/22/2022 2355 by Tereso Irwin RN  Outcome: Progressing     Problem: Safety - Adult  Goal: Free from fall injury  11/22/2022 2355 by Tereso Irwin RN  Outcome: Progressing     Problem: Pain  Goal: Verbalizes/displays adequate comfort level or baseline comfort level  11/22/2022 2355 by Tereso Irwin RN  Outcome: Progressing     Problem: Skin/Tissue Integrity  Goal: Absence of new skin breakdown  Description: 1. Monitor for areas of redness and/or skin breakdown  2. Assess vascular access sites hourly  3. Every 4-6 hours minimum:  Change oxygen saturation probe site  4. Every 4-6 hours:  If on nasal continuous positive airway pressure, respiratory therapy assess nares and determine need for appliance change or resting period.   11/22/2022 2355 by Tereso Irwin RN  Outcome: Progressing     Problem: ABCDS Injury Assessment  Goal: Absence of physical injury  11/22/2022 2355 by Tereso Irwin RN  Outcome: Progressing     Problem: Neurosensory - Adult  Goal: Achieves stable or improved neurological status  11/22/2022 2355 by Tereso Irwin RN  Outcome: Progressing     Problem: Neurosensory - Adult  Goal: Absence of seizures  11/22/2022 2355 by Tereso Irwin RN  Outcome: Progressing     Problem: Neurosensory - Adult  Goal: Remains free of injury related to seizures activity  11/22/2022 2355 by Tereso Irwin RN  Outcome: Progressing     Problem: Neurosensory - Adult  Goal: Achieves maximal functionality and self care  11/22/2022 2355 by Tereso Iriwn RN  Outcome: Progressing     Problem: Respiratory - Adult  Goal: Achieves optimal ventilation and oxygenation  11/22/2022 2355 by Tereso Irwin RN  Outcome: Progressing     Problem: Cardiovascular - Adult  Goal: Maintains optimal cardiac output and hemodynamic stability  11/22/2022 2355 by Tomas Saldivar EVGENY Tapia  Outcome: Progressing     Problem: Cardiovascular - Adult  Goal: Absence of cardiac dysrhythmias or at baseline  11/22/2022 2355 by Dexter Millard RN  Outcome: Progressing     Problem: Skin/Tissue Integrity - Adult  Goal: Incisions, wounds, or drain sites healing without S/S of infection  11/22/2022 2355 by Dexter Millard RN  Outcome: Progressing     Problem: Skin/Tissue Integrity - Adult  Goal: Oral mucous membranes remain intact  11/22/2022 2355 by Dexter Millard RN  Outcome: Progressing     Problem: Musculoskeletal - Adult  Goal: Return mobility to safest level of function  11/22/2022 2355 by Dexter Millard RN  Outcome: Progressing     Problem: Musculoskeletal - Adult  Goal: Maintain proper alignment of affected body part  11/22/2022 2355 by Dexter Millard RN  Outcome: Progressing     Problem: Musculoskeletal - Adult  Goal: Return ADL status to a safe level of function  11/22/2022 2355 by Dexter Millard RN  Outcome: Progressing     Problem: Gastrointestinal - Adult  Goal: Minimal or absence of nausea and vomiting  11/22/2022 2355 by Dexter Millard RN  Outcome: Progressing     Problem: Gastrointestinal - Adult  Goal: Maintains or returns to baseline bowel function  11/22/2022 2355 by Dexter Millard RN  Outcome: Progressing     Problem: Gastrointestinal - Adult  Goal: Establish and maintain optimal ostomy function  11/22/2022 2355 by Dexter Millard RN  Outcome: Progressing     Problem: Genitourinary - Adult  Goal: Absence of urinary retention  11/22/2022 2355 by Dexter Millard RN  Outcome: Progressing     Problem: Genitourinary - Adult  Goal: Urinary catheter remains patent  11/22/2022 2355 by Dexter Millard RN  Outcome: Progressing     Problem: Infection - Adult  Goal: Absence of infection at discharge  11/22/2022 2355 by Dexter Millard RN  Outcome: Progressing     Problem: Infection - Adult  Goal: Absence of infection during hospitalization  11/22/2022 2355 by Ke Maier RN  Outcome: Progressing     Problem: Infection - Adult  Goal: Absence of fever/infection during anticipated neutropenic period  11/22/2022 2355 by Ke Maier RN  Outcome: Progressing     Problem: Metabolic/Fluid and Electrolytes - Adult  Goal: Electrolytes maintained within normal limits  11/22/2022 2355 by Ke Maier RN  Outcome: Progressing     Problem: Metabolic/Fluid and Electrolytes - Adult  Goal: Hemodynamic stability and optimal renal function maintained  11/22/2022 2355 by Ke Maier RN  Outcome: Progressing     Problem: Metabolic/Fluid and Electrolytes - Adult  Goal: Glucose maintained within prescribed range  11/22/2022 2355 by Ke Maier RN  Outcome: Progressing     Problem: Hematologic - Adult  Goal: Maintains hematologic stability  11/22/2022 2355 by Ke Maier RN  Outcome: Progressing

## 2022-12-01 NOTE — CARE COORDINATION
[facility]    Non-admit for [facility], [facility]. services,  patient refusing all services at present time.      Monica Monday RN, BSN CTN  [facility] [phone]

## 2022-12-21 NOTE — PROGRESS NOTES
DISCHARGE SUMMARY     DATE OF DISCHARGE: 12/21/2022    DISCHARGE DESTINATION: Home with 24/7 care from pts children and [facility]    Discharging to Facility/ Agency   Name: [facility]  [address]  [address]   Phone:  [phone]  Fax:  [phone]    Leesa contacted to arranged a RW for pt. Dr. Wilver Oscar notified of need for DME order. TRANSPORTATION: Pts son, Yisel Singletary will transport pt home at d/c.      COMMENTS: TARA contacted [facility], [facility]. and spoke with Lord Luque. Lord Luque stated she will notify Jaime Umana about this pt and will have Jaime Umana call this [address] [facility]  [phone]  Electronically signed by Javier Stuart on 12/21/2022 at 11:02 AM    Addendum:  TARA received a call back from Rajinder with [facility], [facility]. stating this facility will provide [facility]. for this pt and will pull orders.    TRAY [facility]  [phone]  Electronically signed by Javier Stuart on 12/21/2022 at 11:04 AM Hospitalist Progress Note      PCP: Elza Colorado. Sandra Magallon MD, MD    Date of Admission: 11/16/2022    Chief Complaint: bleeding from colostomy    Hospital Course:  48-year-old male with past medical history of HIV, hepatitis B cirrhosis, had a wide perineal resection and APR with diverting colostomy due to a HPV related squamous cell anal cancer, diabetes, chronic hepatitis B, HIV, fatty liver disease with cirrhosis followed by Dr. Sven Archer at [facility]. His HIV is well controlled on antiretroviral therapy. on tenofovir for his hepatitis B. He has followed with infectious disease and has had an undetectable viral load and a good CD4 count. The patient has had a diverting colostomy since 2001. He has been followed by Dr. Emerald Mcallister. Hypertension. presents to the hospital for reported seeing pulsatile maroon blood from colostomy. According to patient he has been bleeding for past 48 hours. He mentions he feels dizzy lightheaded he mentions his bleeding has been slowing down in the past 48 hours. Denied fevers chills diarrhea. Seen by GI had EGD w bx and ileoscopy 11/17/22. There was some ulceration at os stoma. EGD w bx and ileoscopy 11/17/22  Postoperative Diagnosis:   1) The esophagus was normal without evidence of esophagitis, Sainz's esophagus, strictures, rings, furrowing, masses, or nodules. No varices were noted. GE junction was at 41 cm from the incisors. 2) No gastric varices were noted. There was moderate portal gastropathy noted. 3) Mild erythema of the antrum. Biopsies were obtained from the antrum and body of the stomach to rule out active gastritis and H.pylori gastritis. 4) There were pre-pyloric ulcerations noted. No bleeding stigmata were noted. 5) THere were several clean based ulcers noted in the duodenal bulb and duodenal sweep. 6) The second portion of the duodenum had bile noted. No signs of bleeding were noted.   7) There was a reactive appearing nodule with ulceration noted at the os of the ileostomy. There was no active bleeding. 8) Ileal mucosa was normal.  Brown stool was noted    Findings:     1) The esophagus was normal without evidence of esophagitis, Sainz's esophagus, strictures, rings, furrowing, masses, or nodules. No varices were noted. GE junction was at 41 cm from the incisors. 2) No gastric varices were noted. There was moderate portal gastropathy noted. 3) Mild erythema of the antrum. Biopsies were obtained from the antrum and body of the stomach to rule out active gastritis and H.pylori gastritis. 4) There were pre-pyloric ulcerations noted. No bleeding stigmata were noted. 5) THere were several clean based ulcers noted in the duodenal bulb and duodenal sweep. 6) The second portion of the duodenum had bile noted. No signs of bleeding were noted. The scope was then withdrawn back into the stomach, it was decompressed, and the scope was completely withdrawn. Then, an upper endoscope was introduced into the ileoscopy site. There was a reactive appearing nodule with ulceration noted at the os of the ileostomy. There was no active bleeding. Ileal mucosa was normal.  Brown stool was noted. No further bleeding downgraded from ICU to floor 11/18/22    Procedures:  DATE OF PROCEDURE:  11/16/2022  PREOPERATIVE DIAGNOSES:  Difficult Reece, gross hematuria. POSTOPERATIVE DIAGNOSES:  Ureteral stricture, gross hematuria, difficult Reece. OPERATION PERFORMED:  Cystoscopy with urethral dilation. SURGEON:  Emani Rod. Mariama Lamb MD   ANESTHESIA:  Local.   FINDINGS:  Dense penile bulbar urethral stricture, which was dilated  from 12-Surinamese up to 18-Surinamese and then a 16-Surinamese [place]-tip catheter was placed. Subjective: coughed up thick sl blood tinged dark yellow chunk of sputum.  Ky Showers takes his O2 on and off   all other systems neg       Medications:  Reviewed    Infusion Medications    sodium chloride      sodium chloride 25 mL/hr at 11/18/22 5162 dextrose       Scheduled Medications    nicotine  1 patch TransDERmal Daily    raltegravir  400 mg Oral BID    emtricitabine  200 mg Oral Daily    And    Tenofovir Alafenamide Fumarate  25 mg Oral Daily    mometasone-formoterol  2 puff Inhalation BID    sodium chloride flush  10 mL IntraVENous 2 times per day    pantoprazole  40 mg IntraVENous Daily    cefTRIAXone (ROCEPHIN) IV  2,000 mg IntraVENous Q24H    clonazePAM  2 mg Oral BID    QUEtiapine  800 mg Oral Nightly    insulin lispro  0-8 Units SubCUTAneous TID WC    insulin lispro  0-4 Units SubCUTAneous Nightly    albuterol-ipratropium  1 puff Inhalation TID     PRN Meds: oxyCODONE, sodium chloride, sodium chloride flush, sodium chloride, promethazine **OR** ondansetron, polyethylene glycol, acetaminophen **OR** acetaminophen, albuterol, albuterol sulfate HFA, glucose, dextrose bolus **OR** dextrose bolus, glucagon (rDNA), dextrose      Intake/Output Summary (Last 24 hours) at 11/21/2022 1152  Last data filed at 11/21/2022 1007  Gross per 24 hour   Intake 2000 ml   Output 1680 ml   Net 320 ml       Physical Exam Performed:    BP 99/67   Pulse 96   Temp 97.3 °F (36.3 °C) (Oral)   Resp 14   Ht 6' 1\" (1.854 m)   Wt 268 lb 11.9 oz (121.9 kg)   SpO2 94%   BMI 35.46 kg/m²   Gen: No distress. obese conversant up in chair for meal   HEENT  mucosa moist goatee  EYES: PERRL EOMI anicteric  Resp: O2 3L NC, mild wheezing throughout no dyspnea  CV: RRR No murmur or rub. sl wheeze minimal occasional harsh moist cough   Neck left IJ TLC CDI supple no JVD   Extremity No edema   Skin no jaundice no pallor    Abdomen obese NT ND soft  ostomy LLQ stool and gas  M/S: alert Oriented x 3    voids clear yellow urinal         Labs:   Recent Labs     11/19/22  0805   WBC 5.9   HGB 8.6*   HCT 25.6*   PLT 75*     Recent Labs     11/19/22  0450   *   K 3.5   CL 99   CO2 21   BUN 19   CREATININE 0.9   CALCIUM 7.8*     Recent Labs     11/19/22  0450   *   * BILITOT 0.7   ALKPHOS 69     No results for input(s): INR in the last 72 hours. No results for input(s): Ashlee Bienvenido in the last 72 hours. Urinalysis:    No results found for: Leonila Alexander, BACTERIA, RBCUA, BLOODU, [address], [place] [address] [place] [address]    Radiology:  XR CHEST (2 VW)   Final Result   Left perihilar consolidation/pneumonia with new left lower lobe infiltrate   compared with prior exam.      Lines in place as described above. CT CHEST WO CONTRAST   Final Result   1. Patchy confluent airspace opacities in the left upper lobe which likely   reflect pneumonia. 2. Bullous emphysema. 3. Left internal jugular venous catheter extending to the central aspect of   the left brachiocephalic vein and right subclavian Port-A-Cath with a loop at   the central aspect of the subclavian vein. 4. Small pericardial effusion. 5. Hepatic cirrhosis and at least mild splenomegaly. XR CHEST PORTABLE   Final Result   Bilateral central bronchial thickening with left perihilar pneumonia. Left IJ central venous catheter terminates in the central left   brachiocephalic vein. A right-sided chest port is identified, with the mid aspect of the catheter   seen coiled overlying the region of the central right subclavian   vein/innominate vein. US ABDOMEN COMPLETE   Final Result   Marked right hydronephrosis and marked right renal parenchymal atrophy.       RECOMMENDATIONS:   Unavailable             IP CONSULT TO GENERAL SURGERY  IP CONSULT TO GENERAL SURGERY  IP CONSULT TO UROLOGY  IP CONSULT TO GI  IP CONSULT TO NEPHROLOGY  IP CONSULT TO CRITICAL CARE  IP CONSULT TO CRITICAL CARE    Assessment/Plan:    Active Hospital Problems    Diagnosis     Bleeding from colostomy stoma ([facility] [address] [K94.01]      Priority: Medium    Rectal bleeding [K62.5]      Priority: Medium     Cough  Pleuritic chest pain  Fever  LLL pneumonia  Sepsis due to pneumonia  Flu/covid neg  Zithro completing/rocephin   Tobacco dependence  Bullous emphyesema  Wheezing  Tachycardia  Acute hypoxic respiratory failure  O2 3L NC  dulera  Combivent inhaler TID  Pulmonary following   --1ppd  Acute blood loss Anemia  Bleeding from stoma site-resolved  Hemorrhagic shock   Hgb stable 8.9  Iron 46, TIBC 213 %sat 22  Seen by GI general surgery  EGD 11/17/22 mild portal gastropathy, pre-pyloric ulcerations, clean based ulcers in the duodenal bulb, no signs of active bleeding.  - ileoscopy was also done showing reactive appearing nodule with ulceration at the os of the ostomy  Portacath right chest wall  Cirrhosis liver  Chronic Hepatitis B  HIV  Previous IVDA cocaine and meth  Elevated transaminases   TLC dc prior to dc   AFP tumor marker neg 1.1   fatty liver disease with cirrhosis followed by Dr. Shanel House at [address] HIV meds emtriva raltegravir    Hx APR diverting colostomy   Hx HPV related squamous cell anal cancer  Hyponatremia  na 132    Urine na <20, urine osmo 342 not c/w SIADH. Better after off IVF  Will recheck baseline labs tomorrow   Obesity BMI 34.9    Acute renal failure  Chronic right hydronephrosis  Urethral stricture   --urology had been consulted for harris but felt dense stricture in penile urethra so cystoscopy w urethral dilation 11/16/22  --seen by nephrology suspect likely has solitary fxing kidney   Avoid reinsert harris  I recommended nursing sit him up or stand to void      Downgraded to floor status 11/18  PT OT-recommending HHC w 24hr supervision    Pt concerned about pneumonia wants to make sure fully treated before dc. Last cxr 11/20/22 showed some new infiltrate LLL  States was on 3 abx prior to admission for only 2 days (it was augmentin, prednisone and zofran)   Sl winded,   Is seated in chair voiding easier when up  Still needing o2 . . reassess tomorrow. Will recheck CBC and CMP as transaminases have been very high as well 1000->800's        DVT Prophylaxis: scd  Diet: ADULT DIET;  Regular; 4 carb choices (60 gm/meal)  Code Status: Full Code  PT/OT Eval Status: ordered    Dispo - home w wife Need HHC home o2 eval may need to be repeated ?  Radha Quintanilla MD

## 2023-12-20 ENCOUNTER — APPOINTMENT (OUTPATIENT)
Dept: CT IMAGING | Age: 53
DRG: 347 | End: 2023-12-20
Payer: MEDICARE

## 2023-12-20 ENCOUNTER — HOSPITAL ENCOUNTER (INPATIENT)
Age: 53
LOS: 4 days | Discharge: HOME | DRG: 347 | End: 2023-12-24
Attending: STUDENT IN AN ORGANIZED HEALTH CARE EDUCATION/TRAINING PROGRAM | Admitting: STUDENT IN AN ORGANIZED HEALTH CARE EDUCATION/TRAINING PROGRAM
Payer: MEDICARE

## 2023-12-20 ENCOUNTER — APPOINTMENT (OUTPATIENT)
Dept: GENERAL RADIOLOGY | Age: 53
DRG: 347 | End: 2023-12-20
Payer: MEDICARE

## 2023-12-20 DIAGNOSIS — R57.8 HEMORRHAGIC SHOCK (HCC): Primary | ICD-10-CM

## 2023-12-20 PROBLEM — K31.84 GASTROPARESIS: Status: ACTIVE | Noted: 2023-12-20

## 2023-12-20 PROBLEM — N13.30 HYDRONEPHROSIS: Status: ACTIVE | Noted: 2023-12-20

## 2023-12-20 PROBLEM — R16.1 SPLENOMEGALY: Status: ACTIVE | Noted: 2023-12-20

## 2023-12-20 PROBLEM — K92.2 GASTRIC BLEEDING: Status: ACTIVE | Noted: 2023-12-20

## 2023-12-20 PROBLEM — K74.60 CIRRHOSIS (HCC): Status: ACTIVE | Noted: 2023-12-20

## 2023-12-20 PROBLEM — K62.5 RECTAL BLEEDING: Status: RESOLVED | Noted: 2022-11-16 | Resolved: 2023-12-20

## 2023-12-20 PROBLEM — E11.9 TYPE 2 DIABETES MELLITUS, WITHOUT LONG-TERM CURRENT USE OF INSULIN (HCC): Status: ACTIVE | Noted: 2023-12-20

## 2023-12-20 PROBLEM — E66.9 OBESITY: Status: ACTIVE | Noted: 2023-12-20

## 2023-12-20 PROBLEM — N17.9 AKI (ACUTE KIDNEY INJURY) (HCC): Status: ACTIVE | Noted: 2023-12-20

## 2023-12-20 PROBLEM — B20 HIV (HUMAN IMMUNODEFICIENCY VIRUS INFECTION) (HCC): Status: ACTIVE | Noted: 2023-12-20

## 2023-12-20 LAB
ABO + RH BLD: NORMAL
ALBUMIN SERPL-MCNC: 3.2 G/DL (ref 3.4–5)
ALBUMIN/GLOB SERPL: 1.7 {RATIO} (ref 1.1–2.2)
ALP SERPL-CCNC: 74 U/L (ref 40–129)
ALT SERPL-CCNC: 23 U/L (ref 10–40)
ANION GAP SERPL CALCULATED.3IONS-SCNC: 13 MMOL/L (ref 3–16)
APTT BLD: 24.8 SEC (ref 22.7–35.9)
AST SERPL-CCNC: 19 U/L (ref 15–37)
BASOPHILS # BLD: 0.1 K/UL (ref 0–0.2)
BASOPHILS NFR BLD: 0.7 %
BILIRUB SERPL-MCNC: 0.8 MG/DL (ref 0–1)
BLD GP AB SCN SERPL QL: NORMAL
BLOOD BANK DISPENSE STATUS: NORMAL
BLOOD BANK PRODUCT CODE: NORMAL
BPU ID: NORMAL
BUN SERPL-MCNC: 28 MG/DL (ref 7–20)
CALCIUM SERPL-MCNC: 8.7 MG/DL (ref 8.3–10.6)
CHLORIDE SERPL-SCNC: 107 MMOL/L (ref 99–110)
CO2 SERPL-SCNC: 18 MMOL/L (ref 21–32)
CREAT SERPL-MCNC: 1.4 MG/DL (ref 0.9–1.3)
DEPRECATED RDW RBC AUTO: 15.2 % (ref 12.4–15.4)
DESCRIPTION BLOOD BANK: NORMAL
EOSINOPHIL # BLD: 0 K/UL (ref 0–0.6)
EOSINOPHIL NFR BLD: 0.3 %
GFR SERPLBLD CREATININE-BSD FMLA CKD-EPI: 59 ML/MIN/{1.73_M2}
GLUCOSE BLD-MCNC: 431 MG/DL (ref 70–99)
GLUCOSE SERPL-MCNC: 393 MG/DL (ref 70–99)
HCT VFR BLD AUTO: 27.3 % (ref 40.5–52.5)
HGB BLD-MCNC: 8.8 G/DL (ref 13.5–17.5)
INR PPP: 1.29 (ref 0.84–1.16)
LYMPHOCYTES # BLD: 3.4 K/UL (ref 1–5.1)
LYMPHOCYTES NFR BLD: 22.2 %
MCH RBC QN AUTO: 29 PG (ref 26–34)
MCHC RBC AUTO-ENTMCNC: 32.4 G/DL (ref 31–36)
MCV RBC AUTO: 89.5 FL (ref 80–100)
MONOCYTES # BLD: 1.4 K/UL (ref 0–1.3)
MONOCYTES NFR BLD: 9.4 %
NEUTROPHILS # BLD: 10.3 K/UL (ref 1.7–7.7)
NEUTROPHILS NFR BLD: 67.4 %
PERFORMED ON: ABNORMAL
PLATELET # BLD AUTO: 159 K/UL (ref 135–450)
PMV BLD AUTO: 9.9 FL (ref 5–10.5)
POTASSIUM SERPL-SCNC: 4.4 MMOL/L (ref 3.5–5.1)
PROT SERPL-MCNC: 5.1 G/DL (ref 6.4–8.2)
PROTHROMBIN TIME: 16.1 SEC (ref 11.5–14.8)
RBC # BLD AUTO: 3.05 M/UL (ref 4.2–5.9)
SODIUM SERPL-SCNC: 138 MMOL/L (ref 136–145)
WBC # BLD AUTO: 15.3 K/UL (ref 4–11)

## 2023-12-20 PROCEDURE — 6360000002 HC RX W HCPCS

## 2023-12-20 PROCEDURE — 85014 HEMATOCRIT: CPT

## 2023-12-20 PROCEDURE — 85610 PROTHROMBIN TIME: CPT

## 2023-12-20 PROCEDURE — 86901 BLOOD TYPING SEROLOGIC RH(D): CPT

## 2023-12-20 PROCEDURE — 02HV33Z INSERTION OF INFUSION DEVICE INTO SUPERIOR VENA CAVA, PERCUTANEOUS APPROACH: ICD-10-PCS | Performed by: STUDENT IN AN ORGANIZED HEALTH CARE EDUCATION/TRAINING PROGRAM

## 2023-12-20 PROCEDURE — 86900 BLOOD TYPING SEROLOGIC ABO: CPT

## 2023-12-20 PROCEDURE — 30243R1 TRANSFUSION OF NONAUTOLOGOUS PLATELETS INTO CENTRAL VEIN, PERCUTANEOUS APPROACH: ICD-10-PCS | Performed by: STUDENT IN AN ORGANIZED HEALTH CARE EDUCATION/TRAINING PROGRAM

## 2023-12-20 PROCEDURE — P9017 PLASMA 1 DONOR FRZ W/IN 8 HR: HCPCS

## 2023-12-20 PROCEDURE — 85025 COMPLETE CBC W/AUTO DIFF WBC: CPT

## 2023-12-20 PROCEDURE — 93005 ELECTROCARDIOGRAM TRACING: CPT | Performed by: STUDENT IN AN ORGANIZED HEALTH CARE EDUCATION/TRAINING PROGRAM

## 2023-12-20 PROCEDURE — 36415 COLL VENOUS BLD VENIPUNCTURE: CPT

## 2023-12-20 PROCEDURE — 36556 INSERT NON-TUNNEL CV CATH: CPT

## 2023-12-20 PROCEDURE — 99222 1ST HOSP IP/OBS MODERATE 55: CPT | Performed by: SURGERY

## 2023-12-20 PROCEDURE — 6360000004 HC RX CONTRAST MEDICATION: Performed by: STUDENT IN AN ORGANIZED HEALTH CARE EDUCATION/TRAINING PROGRAM

## 2023-12-20 PROCEDURE — 86923 COMPATIBILITY TEST ELECTRIC: CPT

## 2023-12-20 PROCEDURE — 85730 THROMBOPLASTIN TIME PARTIAL: CPT

## 2023-12-20 PROCEDURE — 30243N1 TRANSFUSION OF NONAUTOLOGOUS RED BLOOD CELLS INTO CENTRAL VEIN, PERCUTANEOUS APPROACH: ICD-10-PCS | Performed by: STUDENT IN AN ORGANIZED HEALTH CARE EDUCATION/TRAINING PROGRAM

## 2023-12-20 PROCEDURE — 71045 X-RAY EXAM CHEST 1 VIEW: CPT

## 2023-12-20 PROCEDURE — 86850 RBC ANTIBODY SCREEN: CPT

## 2023-12-20 PROCEDURE — 85018 HEMOGLOBIN: CPT

## 2023-12-20 PROCEDURE — P9016 RBC LEUKOCYTES REDUCED: HCPCS

## 2023-12-20 PROCEDURE — 74174 CTA ABD&PLVS W/CONTRAST: CPT

## 2023-12-20 PROCEDURE — 2100000000 HC CCU R&B

## 2023-12-20 PROCEDURE — 99285 EMERGENCY DEPT VISIT HI MDM: CPT

## 2023-12-20 PROCEDURE — 80053 COMPREHEN METABOLIC PANEL: CPT

## 2023-12-20 PROCEDURE — P9035 PLATELET PHERES LEUKOREDUCED: HCPCS

## 2023-12-20 RX ORDER — INSULIN GLARGINE 100 [IU]/ML
5 INJECTION, SOLUTION SUBCUTANEOUS NIGHTLY
Status: DISCONTINUED | OUTPATIENT
Start: 2023-12-21 | End: 2023-12-23

## 2023-12-20 RX ORDER — CALCIUM CHLORIDE 100 MG/ML
1000 INJECTION INTRAVENOUS; INTRAVENTRICULAR PRN
Status: DISCONTINUED | OUTPATIENT
Start: 2023-12-20 | End: 2023-12-24 | Stop reason: HOSPADM

## 2023-12-20 RX ORDER — POLYETHYLENE GLYCOL 3350 17 G/17G
17 POWDER, FOR SOLUTION ORAL DAILY PRN
Status: DISCONTINUED | OUTPATIENT
Start: 2023-12-20 | End: 2023-12-24 | Stop reason: HOSPADM

## 2023-12-20 RX ORDER — ONDANSETRON 2 MG/ML
4 INJECTION INTRAMUSCULAR; INTRAVENOUS EVERY 6 HOURS PRN
Status: DISCONTINUED | OUTPATIENT
Start: 2023-12-20 | End: 2023-12-24 | Stop reason: HOSPADM

## 2023-12-20 RX ORDER — INSULIN LISPRO 100 [IU]/ML
0-4 INJECTION, SOLUTION INTRAVENOUS; SUBCUTANEOUS NIGHTLY
Status: DISCONTINUED | OUTPATIENT
Start: 2023-12-21 | End: 2023-12-21

## 2023-12-20 RX ORDER — ACETAMINOPHEN 325 MG/1
650 TABLET ORAL EVERY 6 HOURS PRN
Status: DISCONTINUED | OUTPATIENT
Start: 2023-12-20 | End: 2023-12-24 | Stop reason: HOSPADM

## 2023-12-20 RX ORDER — INSULIN LISPRO 100 [IU]/ML
0-4 INJECTION, SOLUTION INTRAVENOUS; SUBCUTANEOUS
Status: DISCONTINUED | OUTPATIENT
Start: 2023-12-21 | End: 2023-12-21

## 2023-12-20 RX ORDER — ONDANSETRON 4 MG/1
4 TABLET, ORALLY DISINTEGRATING ORAL EVERY 8 HOURS PRN
Status: DISCONTINUED | OUTPATIENT
Start: 2023-12-20 | End: 2023-12-24 | Stop reason: HOSPADM

## 2023-12-20 RX ORDER — INSULIN LISPRO 100 [IU]/ML
6 INJECTION, SOLUTION INTRAVENOUS; SUBCUTANEOUS ONCE
Status: COMPLETED | OUTPATIENT
Start: 2023-12-21 | End: 2023-12-21

## 2023-12-20 RX ORDER — SODIUM CHLORIDE 0.9 % (FLUSH) 0.9 %
5-40 SYRINGE (ML) INJECTION EVERY 12 HOURS SCHEDULED
Status: DISCONTINUED | OUTPATIENT
Start: 2023-12-21 | End: 2023-12-24 | Stop reason: HOSPADM

## 2023-12-20 RX ORDER — ONDANSETRON 2 MG/ML
INJECTION INTRAMUSCULAR; INTRAVENOUS
Status: COMPLETED
Start: 2023-12-20 | End: 2023-12-20

## 2023-12-20 RX ORDER — ACETAMINOPHEN 650 MG/1
650 SUPPOSITORY RECTAL EVERY 6 HOURS PRN
Status: DISCONTINUED | OUTPATIENT
Start: 2023-12-20 | End: 2023-12-24 | Stop reason: HOSPADM

## 2023-12-20 RX ORDER — SODIUM CHLORIDE 9 MG/ML
INJECTION, SOLUTION INTRAVENOUS PRN
Status: DISCONTINUED | OUTPATIENT
Start: 2023-12-20 | End: 2023-12-24 | Stop reason: HOSPADM

## 2023-12-20 RX ORDER — CLONAZEPAM 0.5 MG/1
2 TABLET ORAL 2 TIMES DAILY PRN
Status: DISCONTINUED | OUTPATIENT
Start: 2023-12-20 | End: 2023-12-21

## 2023-12-20 RX ORDER — SODIUM CHLORIDE 0.9 % (FLUSH) 0.9 %
5-40 SYRINGE (ML) INJECTION PRN
Status: DISCONTINUED | OUTPATIENT
Start: 2023-12-20 | End: 2023-12-24 | Stop reason: HOSPADM

## 2023-12-20 RX ORDER — DEXTROSE MONOHYDRATE 100 MG/ML
INJECTION, SOLUTION INTRAVENOUS CONTINUOUS PRN
Status: DISCONTINUED | OUTPATIENT
Start: 2023-12-20 | End: 2023-12-24 | Stop reason: HOSPADM

## 2023-12-20 RX ORDER — MAGNESIUM SULFATE IN WATER 40 MG/ML
2000 INJECTION, SOLUTION INTRAVENOUS PRN
Status: DISCONTINUED | OUTPATIENT
Start: 2023-12-20 | End: 2023-12-24 | Stop reason: HOSPADM

## 2023-12-20 RX ORDER — POTASSIUM CHLORIDE 7.45 MG/ML
10 INJECTION INTRAVENOUS PRN
Status: DISCONTINUED | OUTPATIENT
Start: 2023-12-20 | End: 2023-12-24 | Stop reason: HOSPADM

## 2023-12-20 RX ORDER — POTASSIUM CHLORIDE 20 MEQ/1
40 TABLET, EXTENDED RELEASE ORAL PRN
Status: DISCONTINUED | OUTPATIENT
Start: 2023-12-20 | End: 2023-12-24 | Stop reason: HOSPADM

## 2023-12-20 RX ADMIN — ONDANSETRON: 2 INJECTION INTRAMUSCULAR; INTRAVENOUS at 19:45

## 2023-12-20 RX ADMIN — IOPAMIDOL 75 ML: 755 INJECTION, SOLUTION INTRAVENOUS at 20:52

## 2023-12-21 PROBLEM — R57.8 HEMORRHAGIC SHOCK (HCC): Status: ACTIVE | Noted: 2023-12-21

## 2023-12-21 LAB
ANION GAP SERPL CALCULATED.3IONS-SCNC: 8 MMOL/L (ref 3–16)
ANION GAP SERPL CALCULATED.3IONS-SCNC: 8 MMOL/L (ref 3–16)
BLOOD BANK DISPENSE STATUS: NORMAL
BLOOD BANK PRODUCT CODE: NORMAL
BPU ID: NORMAL
BUN SERPL-MCNC: 31 MG/DL (ref 7–20)
BUN SERPL-MCNC: 34 MG/DL (ref 7–20)
CA-I BLD-SCNC: 1.2 MMOL/L (ref 1.12–1.32)
CALCIUM SERPL-MCNC: 7.7 MG/DL (ref 8.3–10.6)
CALCIUM SERPL-MCNC: 8.1 MG/DL (ref 8.3–10.6)
CHLORIDE SERPL-SCNC: 105 MMOL/L (ref 99–110)
CHLORIDE SERPL-SCNC: 110 MMOL/L (ref 99–110)
CO2 SERPL-SCNC: 19 MMOL/L (ref 21–32)
CO2 SERPL-SCNC: 20 MMOL/L (ref 21–32)
CREAT SERPL-MCNC: 1.5 MG/DL (ref 0.9–1.3)
CREAT SERPL-MCNC: 1.6 MG/DL (ref 0.9–1.3)
DEPRECATED RDW RBC AUTO: 15.5 % (ref 12.4–15.4)
DEPRECATED RDW RBC AUTO: 15.6 % (ref 12.4–15.4)
DEPRECATED RDW RBC AUTO: 16 % (ref 12.4–15.4)
DESCRIPTION BLOOD BANK: NORMAL
EKG ATRIAL RATE: 118 BPM
EKG DIAGNOSIS: NORMAL
EKG P AXIS: 65 DEGREES
EKG P-R INTERVAL: 142 MS
EKG Q-T INTERVAL: 340 MS
EKG QRS DURATION: 76 MS
EKG QTC CALCULATION (BAZETT): 476 MS
EKG R AXIS: 63 DEGREES
EKG T AXIS: 62 DEGREES
EKG VENTRICULAR RATE: 118 BPM
EMERGENCY ISSUE BLOOD PRODUCTS SIGNED FORM: NORMAL
GFR SERPLBLD CREATININE-BSD FMLA CKD-EPI: 51 ML/MIN/{1.73_M2}
GFR SERPLBLD CREATININE-BSD FMLA CKD-EPI: 55 ML/MIN/{1.73_M2}
GLUCOSE BLD-MCNC: 259 MG/DL (ref 70–99)
GLUCOSE BLD-MCNC: 259 MG/DL (ref 70–99)
GLUCOSE BLD-MCNC: 305 MG/DL (ref 70–99)
GLUCOSE BLD-MCNC: 318 MG/DL (ref 70–99)
GLUCOSE BLD-MCNC: 320 MG/DL (ref 70–99)
GLUCOSE SERPL-MCNC: 308 MG/DL (ref 70–99)
GLUCOSE SERPL-MCNC: 371 MG/DL (ref 70–99)
HCT VFR BLD AUTO: 22 % (ref 40.5–52.5)
HCT VFR BLD AUTO: 25.5 % (ref 40.5–52.5)
HCT VFR BLD AUTO: 26.9 % (ref 40.5–52.5)
HCT VFR BLD AUTO: 27 % (ref 40.5–52.5)
HCT VFR BLD AUTO: 30.8 % (ref 40.5–52.5)
HCT VFR BLD AUTO: 31 % (ref 40.5–52.5)
HCT VFR BLD AUTO: 32.3 % (ref 40.5–52.5)
HGB BLD CALC-MCNC: 9.2 GM/DL (ref 13.5–17.5)
HGB BLD-MCNC: 10.4 G/DL (ref 13.5–17.5)
HGB BLD-MCNC: 10.4 G/DL (ref 13.5–17.5)
HGB BLD-MCNC: 10.9 G/DL (ref 13.5–17.5)
HGB BLD-MCNC: 7.6 G/DL (ref 13.5–17.5)
HGB BLD-MCNC: 8.7 G/DL (ref 13.5–17.5)
HGB BLD-MCNC: 9.1 G/DL (ref 13.5–17.5)
LACTATE BLD-SCNC: 2.19 MMOL/L (ref 0.4–2)
LACTATE BLDV-SCNC: 1.7 MMOL/L (ref 0.4–1.9)
LACTATE BLDV-SCNC: 2.4 MMOL/L (ref 0.4–1.9)
MCH RBC QN AUTO: 29.4 PG (ref 26–34)
MCH RBC QN AUTO: 29.5 PG (ref 26–34)
MCH RBC QN AUTO: 29.6 PG (ref 26–34)
MCHC RBC AUTO-ENTMCNC: 33.8 G/DL (ref 31–36)
MCHC RBC AUTO-ENTMCNC: 34.2 G/DL (ref 31–36)
MCHC RBC AUTO-ENTMCNC: 34.2 G/DL (ref 31–36)
MCV RBC AUTO: 86.5 FL (ref 80–100)
MCV RBC AUTO: 86.5 FL (ref 80–100)
MCV RBC AUTO: 87 FL (ref 80–100)
PERFORMED ON: ABNORMAL
PLATELET # BLD AUTO: 72 K/UL (ref 135–450)
PLATELET # BLD AUTO: 77 K/UL (ref 135–450)
PLATELET # BLD AUTO: 89 K/UL (ref 135–450)
PLATELET BLD QL SMEAR: ABNORMAL
PMV BLD AUTO: 8.7 FL (ref 5–10.5)
PMV BLD AUTO: 8.7 FL (ref 5–10.5)
PMV BLD AUTO: 9.2 FL (ref 5–10.5)
POC SAMPLE TYPE: ABNORMAL
POTASSIUM BLD-SCNC: 4.4 MMOL/L (ref 3.5–5.1)
POTASSIUM SERPL-SCNC: 4.7 MMOL/L (ref 3.5–5.1)
POTASSIUM SERPL-SCNC: 4.9 MMOL/L (ref 3.5–5.1)
RBC # BLD AUTO: 2.55 M/UL (ref 4.2–5.9)
RBC # BLD AUTO: 2.94 M/UL (ref 4.2–5.9)
RBC # BLD AUTO: 3.09 M/UL (ref 4.2–5.9)
SLIDE REVIEW: ABNORMAL
SODIUM SERPL-SCNC: 133 MMOL/L (ref 136–145)
SODIUM SERPL-SCNC: 137 MMOL/L (ref 136–145)
WBC # BLD AUTO: 11.2 K/UL (ref 4–11)
WBC # BLD AUTO: 12.2 K/UL (ref 4–11)
WBC # BLD AUTO: 8.5 K/UL (ref 4–11)

## 2023-12-21 PROCEDURE — 99222 1ST HOSP IP/OBS MODERATE 55: CPT | Performed by: INTERNAL MEDICINE

## 2023-12-21 PROCEDURE — 83605 ASSAY OF LACTIC ACID: CPT

## 2023-12-21 PROCEDURE — 93010 ELECTROCARDIOGRAM REPORT: CPT | Performed by: INTERNAL MEDICINE

## 2023-12-21 PROCEDURE — 2500000003 HC RX 250 WO HCPCS: Performed by: NURSE PRACTITIONER

## 2023-12-21 PROCEDURE — 3E043XZ INTRODUCTION OF VASOPRESSOR INTO CENTRAL VEIN, PERCUTANEOUS APPROACH: ICD-10-PCS | Performed by: STUDENT IN AN ORGANIZED HEALTH CARE EDUCATION/TRAINING PROGRAM

## 2023-12-21 PROCEDURE — 85018 HEMOGLOBIN: CPT

## 2023-12-21 PROCEDURE — 94760 N-INVAS EAR/PLS OXIMETRY 1: CPT

## 2023-12-21 PROCEDURE — 6370000000 HC RX 637 (ALT 250 FOR IP): Performed by: STUDENT IN AN ORGANIZED HEALTH CARE EDUCATION/TRAINING PROGRAM

## 2023-12-21 PROCEDURE — 82330 ASSAY OF CALCIUM: CPT

## 2023-12-21 PROCEDURE — 0W3P7ZZ CONTROL BLEEDING IN GASTROINTESTINAL TRACT, VIA NATURAL OR ARTIFICIAL OPENING: ICD-10-PCS | Performed by: STUDENT IN AN ORGANIZED HEALTH CARE EDUCATION/TRAINING PROGRAM

## 2023-12-21 PROCEDURE — 83036 HEMOGLOBIN GLYCOSYLATED A1C: CPT

## 2023-12-21 PROCEDURE — 80048 BASIC METABOLIC PNL TOTAL CA: CPT

## 2023-12-21 PROCEDURE — 6370000000 HC RX 637 (ALT 250 FOR IP): Performed by: NURSE PRACTITIONER

## 2023-12-21 PROCEDURE — 36592 COLLECT BLOOD FROM PICC: CPT

## 2023-12-21 PROCEDURE — 99232 SBSQ HOSP IP/OBS MODERATE 35: CPT | Performed by: SURGERY

## 2023-12-21 PROCEDURE — 85027 COMPLETE CBC AUTOMATED: CPT

## 2023-12-21 PROCEDURE — 36430 TRANSFUSION BLD/BLD COMPNT: CPT

## 2023-12-21 PROCEDURE — 6360000002 HC RX W HCPCS: Performed by: HOSPITALIST

## 2023-12-21 PROCEDURE — 94640 AIRWAY INHALATION TREATMENT: CPT

## 2023-12-21 PROCEDURE — 2100000000 HC CCU R&B

## 2023-12-21 PROCEDURE — 2580000003 HC RX 258: Performed by: HOSPITALIST

## 2023-12-21 PROCEDURE — APPNB15 APP NON BILLABLE TIME 0-15 MINS: Performed by: NURSE PRACTITIONER

## 2023-12-21 PROCEDURE — 85014 HEMATOCRIT: CPT

## 2023-12-21 PROCEDURE — 84132 ASSAY OF SERUM POTASSIUM: CPT

## 2023-12-21 PROCEDURE — 6370000000 HC RX 637 (ALT 250 FOR IP): Performed by: HOSPITALIST

## 2023-12-21 PROCEDURE — 36415 COLL VENOUS BLD VENIPUNCTURE: CPT

## 2023-12-21 PROCEDURE — 2580000003 HC RX 258: Performed by: STUDENT IN AN ORGANIZED HEALTH CARE EDUCATION/TRAINING PROGRAM

## 2023-12-21 PROCEDURE — 6360000002 HC RX W HCPCS: Performed by: STUDENT IN AN ORGANIZED HEALTH CARE EDUCATION/TRAINING PROGRAM

## 2023-12-21 PROCEDURE — 82947 ASSAY GLUCOSE BLOOD QUANT: CPT

## 2023-12-21 RX ORDER — INSULIN LISPRO 100 [IU]/ML
0-16 INJECTION, SOLUTION INTRAVENOUS; SUBCUTANEOUS
Status: DISCONTINUED | OUTPATIENT
Start: 2023-12-21 | End: 2023-12-24 | Stop reason: HOSPADM

## 2023-12-21 RX ORDER — SODIUM CHLORIDE 9 MG/ML
INJECTION, SOLUTION INTRAVENOUS PRN
Status: DISCONTINUED | OUTPATIENT
Start: 2023-12-21 | End: 2023-12-24 | Stop reason: HOSPADM

## 2023-12-21 RX ORDER — ATORVASTATIN CALCIUM 40 MG/1
40 TABLET, FILM COATED ORAL DAILY
COMMUNITY

## 2023-12-21 RX ORDER — POTASSIUM CHLORIDE 750 MG/1
10 TABLET, EXTENDED RELEASE ORAL DAILY
COMMUNITY

## 2023-12-21 RX ORDER — EMTRICITABINE 200 MG/1
200 CAPSULE ORAL DAILY
Status: DISCONTINUED | OUTPATIENT
Start: 2023-12-21 | End: 2023-12-24 | Stop reason: HOSPADM

## 2023-12-21 RX ORDER — CLONAZEPAM 1 MG/1
1 TABLET ORAL NIGHTLY PRN
Status: DISCONTINUED | OUTPATIENT
Start: 2023-12-21 | End: 2023-12-24 | Stop reason: HOSPADM

## 2023-12-21 RX ORDER — INSULIN GLARGINE 100 [IU]/ML
5 INJECTION, SOLUTION SUBCUTANEOUS NIGHTLY
Status: DISCONTINUED | OUTPATIENT
Start: 2023-12-21 | End: 2023-12-21 | Stop reason: SDUPTHER

## 2023-12-21 RX ORDER — CELECOXIB 100 MG/1
100 CAPSULE ORAL DAILY
COMMUNITY

## 2023-12-21 RX ORDER — HYDROCHLOROTHIAZIDE 25 MG/1
25 TABLET ORAL DAILY
COMMUNITY

## 2023-12-21 RX ORDER — SODIUM CHLORIDE 9 MG/ML
INJECTION, SOLUTION INTRAVENOUS CONTINUOUS
Status: DISCONTINUED | OUTPATIENT
Start: 2023-12-21 | End: 2023-12-24 | Stop reason: HOSPADM

## 2023-12-21 RX ORDER — TOPIRAMATE 25 MG/1
50 TABLET ORAL 3 TIMES DAILY
COMMUNITY

## 2023-12-21 RX ORDER — LISINOPRIL 2.5 MG/1
2.5 TABLET ORAL DAILY
COMMUNITY

## 2023-12-21 RX ORDER — INSULIN LISPRO 100 [IU]/ML
0-4 INJECTION, SOLUTION INTRAVENOUS; SUBCUTANEOUS NIGHTLY
Status: DISCONTINUED | OUTPATIENT
Start: 2023-12-21 | End: 2023-12-24 | Stop reason: HOSPADM

## 2023-12-21 RX ORDER — CALCIUM GLUCONATE 20 MG/ML
1000 INJECTION, SOLUTION INTRAVENOUS ONCE
Status: COMPLETED | OUTPATIENT
Start: 2023-12-21 | End: 2023-12-21

## 2023-12-21 RX ADMIN — INSULIN LISPRO 8 UNITS: 100 INJECTION, SOLUTION INTRAVENOUS; SUBCUTANEOUS at 17:42

## 2023-12-21 RX ADMIN — PHYTONADIONE 10 MG: 10 INJECTION, EMULSION INTRAMUSCULAR; INTRAVENOUS; SUBCUTANEOUS at 16:00

## 2023-12-21 RX ADMIN — CALCIUM GLUCONATE 1000 MG: 20 INJECTION, SOLUTION INTRAVENOUS at 10:59

## 2023-12-21 RX ADMIN — TENOFOVIR ALAFENAMIDE 25 MG: 25 TABLET ORAL at 08:20

## 2023-12-21 RX ADMIN — ONDANSETRON 4 MG: 2 INJECTION INTRAMUSCULAR; INTRAVENOUS at 05:27

## 2023-12-21 RX ADMIN — CLONAZEPAM 1 MG: 0.5 TABLET ORAL at 03:14

## 2023-12-21 RX ADMIN — INSULIN GLARGINE 5 UNITS: 100 INJECTION, SOLUTION SUBCUTANEOUS at 21:36

## 2023-12-21 RX ADMIN — MOMETASONE FUROATE AND FORMOTEROL FUMARATE DIHYDRATE 2 PUFF: 100; 5 AEROSOL RESPIRATORY (INHALATION) at 07:48

## 2023-12-21 RX ADMIN — QUETIAPINE FUMARATE 400 MG: 150 TABLET, EXTENDED RELEASE ORAL at 08:21

## 2023-12-21 RX ADMIN — INSULIN LISPRO 4 UNITS: 100 INJECTION, SOLUTION INTRAVENOUS; SUBCUTANEOUS at 00:02

## 2023-12-21 RX ADMIN — INSULIN LISPRO 3 UNITS: 100 INJECTION, SOLUTION INTRAVENOUS; SUBCUTANEOUS at 08:20

## 2023-12-21 RX ADMIN — QUETIAPINE FUMARATE 400 MG: 150 TABLET, EXTENDED RELEASE ORAL at 03:19

## 2023-12-21 RX ADMIN — RALTEGRAVIR 400 MG: 400 TABLET, FILM COATED ORAL at 03:19

## 2023-12-21 RX ADMIN — INSULIN GLARGINE 5 UNITS: 100 INJECTION, SOLUTION SUBCUTANEOUS at 00:04

## 2023-12-21 RX ADMIN — QUETIAPINE FUMARATE 400 MG: 150 TABLET, EXTENDED RELEASE ORAL at 22:06

## 2023-12-21 RX ADMIN — RALTEGRAVIR 400 MG: 400 TABLET, FILM COATED ORAL at 22:06

## 2023-12-21 RX ADMIN — MOMETASONE FUROATE AND FORMOTEROL FUMARATE DIHYDRATE 2 PUFF: 100; 5 AEROSOL RESPIRATORY (INHALATION) at 21:53

## 2023-12-21 RX ADMIN — INSULIN LISPRO 12 UNITS: 100 INJECTION, SOLUTION INTRAVENOUS; SUBCUTANEOUS at 12:49

## 2023-12-21 RX ADMIN — INSULIN LISPRO 6 UNITS: 100 INJECTION, SOLUTION INTRAVENOUS; SUBCUTANEOUS at 00:03

## 2023-12-21 RX ADMIN — RALTEGRAVIR 400 MG: 400 TABLET, FILM COATED ORAL at 08:21

## 2023-12-21 RX ADMIN — SODIUM CHLORIDE, PRESERVATIVE FREE 10 ML: 5 INJECTION INTRAVENOUS at 21:37

## 2023-12-21 RX ADMIN — EMTRICITABINE 200 MG: 200 CAPSULE ORAL at 08:20

## 2023-12-21 NOTE — ED NOTES
Pt requested to use phone to call his wife at home. Contact was made, pt updated his wife. Pt was also concerned about the location of his cell phone. Cell phone had been left at his residence, it is not at the hospital with this pt. Pt states he is feeling much better at this time. Pt aware he will be getting admitted due to amount of blood he has had transfused. Pt was allowed to replace his colostomy bag with his personal supplies for familiarity and comfort. Pt blankets swapped for clean ones. Pt now comfortable with voices no further needs or complaints.

## 2023-12-21 NOTE — ED NOTES
20g EJ has infiltrated. Dr Conte Trumann in room to place central line. Dixon infusion moved to 20 gauge access left hand.

## 2023-12-21 NOTE — ED PROVIDER NOTES
[facility]      EMERGENCY MEDICINE     Pt Name: Alycia Montalvo  MRN: [id number]  [address] [facility] 1970  Date of evaluation: 12/20/2023  Provider: Giselle Laurent MD    CHIEF COMPLAINT       Chief Complaint   Patient presents with    Rectal Bleeding     Pt has blood perfusing from colostomy since 0200 this am, pt is hypotensive pale cool and clammy      HISTORY OF PRESENT ILLNESS   Alycia Montalvo is a 46 y.o. male who presents to the emergency department for bleeding from his stoma site. He has a colostomy secondary to colorectal cancer. History of a shoulder dog scratched him this morning he had significant bleeding and has changes Stomabag for blood 15 times a day. EMS brought him in hypotensive tachycardic cool and clammy without IV access but minimally arousable upon his arrival.     PASTMEDICAL HISTORY     Past Medical History:   Diagnosis Date    HIV (human immunodeficiency virus infection) ([facility])        Patient Active Problem List   Diagnosis Code    Rectal bleeding K62.5    Bleeding from colostomy stoma ([facility]) K94.01     SURGICAL HISTORY       Past Surgical History:   Procedure Laterality Date    ILEOSCOPY N/A 11/17/2022    ILEOSCOPY DX ONLY performed by Lynette Foote DO at [address] [place] [address] 11/17/2022    EGD BIOPSY performed by Lynette Foote DO at [address] [place] [address]       Previous Medications    ALBUTEROL SULFATE HFA (PROVENTIL;VENTOLIN;PROAIR) 108 (90 BASE) MCG/ACT INHALER    Inhale 2 puffs into the lungs every 6 hours as needed for Wheezing or Shortness of Breath    ALBUTEROL-IPRATROPIUM (COMBIVENT RESPIMAT)  MCG/ACT AERS INHALER    Inhale 1 puff into the lungs in the morning and 1 puff at noon and 1 puff in the evening and 1 puff before bedtime. CLONAZEPAM (KLONOPIN) 2 MG TABLET    Take 2 mg by mouth in the morning and at bedtime.     DAPAGLIFLOZIN (FARXIGA) 10 MG

## 2023-12-21 NOTE — H&P
1 pack/day for 32.0 years (32.0 ttl pk-yrs)     Types: Cigarettes     Start date: 1990     Quit date: 2022     Years since quittin.0    Smokeless tobacco: Never   Vaping Use    Vaping Use: Every day    Start date: 1989   Substance and Sexual Activity    Alcohol use: Never    Drug use: Not Currently    Sexual activity: Not on file   Other Topics Concern    Not on file   Social History Narrative    Not on file     Social Determinants of Health     Financial Resource Strain: Not on file   Food Insecurity: Not on file   Transportation Needs: Not on file   Physical Activity: Not on file   Stress: Not on file   Social Connections: Not on file   Intimate Partner Violence: Not on file   Housing Stability: Not on file     Family History   Problem Relation Age of Onset    Anesth Problems Neg Hx        Medications:  Medication list reviewed with patient. Please see MAR for full details.     Infusion Medications    sodium chloride      sodium chloride      dextrose       Scheduled Medications    QUEtiapine  400 mg Oral BID    tranexamic acid  1,000 mg IntraVENous Once    Followed by    tranexamic acid  1,000 mg IntraVENous Once    emtricitabine-tenofovir alafenamide  1 tablet Oral Daily    raltegravir  400 mg Oral BID    mometasone-formoterol  2 puff Inhalation BID RT    sodium chloride flush  5-40 mL IntraVENous 2 times per day    insulin lispro  0-4 Units SubCUTAneous TID WC    insulin lispro  0-4 Units SubCUTAneous Nightly    insulin glargine  5 Units SubCUTAneous Nightly     PRN Meds: clonazePAM, calcium chloride, sodium chloride flush, sodium chloride, potassium chloride **OR** potassium alternative oral replacement **OR** potassium chloride, magnesium sulfate, ondansetron **OR** ondansetron, polyethylene glycol, acetaminophen **OR** acetaminophen, glucose, dextrose bolus **OR** dextrose bolus, glucagon (rDNA), dextrose    No intake or output data in the 24 hours ending 23 0134    Physical Exam

## 2023-12-21 NOTE — ED NOTES
Rapid transfusion protocol starting. IV access 20 gauge right EJ per [employer]. Left bicep 18 gauge IV per ultrasound per Dr Conte [place].

## 2023-12-21 NOTE — CONSENT
Informed Consent for Blood Component Transfusion Note    I have discussed with the patient the rationale for blood component transfusion; its benefits in treating or preventing fatigue, organ damage, or death; and its risk which includes mild transfusion reactions, rare risk of blood borne infection, or more serious but rare reactions. I have discussed the alternatives to transfusion, including the risk and consequences of not receiving transfusion. The patient had an opportunity to ask questions and had agreed to proceed with transfusion of blood components.     Electronically signed by Ashleigh Martin DO on 12/21/23 at 2:00 AM EST

## 2023-12-21 NOTE — ED NOTES
Per Dr Aurora Hassan, after 6 units infused, advise him and we will switch to platelets. First two units are hanging and infusing now via DEGERFORS.

## 2023-12-21 NOTE — ED NOTES
Report called to Yasmin Olson for Rm 1301. Questions addressed and report accepted.  Transport to be arranged as soon as available

## 2023-12-21 NOTE — CONSULTS
REASON FOR CONSULTATION/CC: Anemia      Consult at request of Marquez Devlin MD     PCP: Otoniel Bacon MD  Established Pulmonologist:  None    Chief Complaint   Patient presents with    Rectal Bleeding     Pt has blood perfusing from colostomy since 0200 this am, pt is hypotensive pale cool and clammy        HISTORY OF PRESENT ILLNESS: Elmer Beal is a 46y.o. year old male with a history of HIV, HBV, rectal cancer who presents with rectal bleeding from ostomy. Patient has a dog who reportedly scratched the ostomy causing significant bleeding overnight. Came in hypotensive tachycardic. General surgery has evaluated and applied suture. During my visit there is no active bleeding. Patient has no complaints other than some mild fatigue      Past Medical History:   Diagnosis Date    HIV (human immunodeficiency virus infection) ([address] [facility])          Past Surgical History:   Procedure Laterality Date    ILEOSCOPY N/A 11/17/2022    ILEOSCOPY DX ONLY performed by Meg Caballero., DO at [address] [facility] N/A 11/17/2022    EGD BIOPSY performed by Meg German, DO at [address] [facility] [place]  family history is not on file. Family history reviewed with patient, pertinent positive HPI  Social Hx   reports that he quit smoking about 13 months ago. His smoking use included cigarettes. He started smoking about 33 years ago. He has a 32.0 pack-year smoking history.  He has never used smokeless tobacco.    Scheduled Meds:   QUEtiapine  400 mg Oral BID    emtricitabine  200 mg Oral Daily    And    Tenofovir Alafenamide Fumarate  25 mg Oral Daily    tranexamic acid  1,000 mg IntraVENous Once    Followed by    tranexamic acid  1,000 mg IntraVENous Once    raltegravir  400 mg Oral BID    mometasone-formoterol  2 puff Inhalation BID RT    sodium chloride flush  5-40 mL IntraVENous 2 times per day    insulin lispro  0-4 Units SubCUTAneous TID WC    insulin lispro  0-4 Units
Drug use: Not Currently    Sexual activity: Not on file   Other Topics Concern    Not on file   Social History Narrative    Not on file     Social Determinants of Health     Financial Resource Strain: Not on file   Food Insecurity: Not on file   Transportation Needs: Not on file   Physical Activity: Not on file   Stress: Not on file   Social Connections: Not on file   Intimate Partner Violence: Not on file   Housing Stability: Not on file     No Known Allergies  Prior to Admission medications    Medication Sig Start Date End Date Taking? Authorizing Provider   fluticasone-vilanterol (BREO ELLIPTA) 100-25 MCG/INH AEPB inhaler Inhale 2 puffs into the lungs daily    Ally Bonilla MD   Dulaglutide (TRULICITY) 3 DY/9.7GG SOPN Inject 3 mg into the skin once a week    Ally Bonilla MD   albuterol sulfate HFA (PROVENTIL;VENTOLIN;PROAIR) 108 (90 Base) MCG/ACT inhaler Inhale 2 puffs into the lungs every 6 hours as needed for Wheezing or Shortness of Breath    Ally Bonilla MD   dapagliflozin (FARXIGA) 10 MG tablet Take 10 mg by mouth every morning    Ally Bonilla MD   emtricitabine-tenofovir alafenamide (DESCOVY) 200-25 MG TABS tablet Take 1 tablet by mouth daily    Ally Bonilla MD   albuterol-ipratropium (COMBIVENT RESPIMAT)  MCG/ACT AERS inhaler Inhale 1 puff into the lungs in the morning and 1 puff at noon and 1 puff in the evening and 1 puff before bedtime. Ally Bonilla MD   QUEtiapine (SEROQUEL XR) 400 MG extended release tablet Take 800 mg by mouth nightly    Ally Bonilla MD   raltegravir (ISENTRESS) 400 MG tablet Take 400 mg by mouth 2 times daily    Ally Bonilla MD   clonazePAM (KLONOPIN) 2 MG tablet Take 2 mg by mouth in the morning and at bedtime.     Ally Bonilla MD   metFORMIN (GLUCOPHAGE-XR) 500 MG extended release tablet Take 1,000 mg by mouth 2 times daily    Ally Bonilla MD   tadalafil (CIALIS) 5 MG tablet Take 5 mg
69 yo male with h/o HTN, DM, HLD, CAD s/p CABG called back to ED by hospitalist Dr. Cruz for positive blood cultures. Patient wa recently admitted on 12/25 for nausea, vomiting, body aches/chills, CT + enteritis. Patient was called back today because blood culture was positive for gm + cocci in pairs and chains, + strep. Patient currently denies complaints. Denies fever, chills, chest pain, sob, abd pain, N/V, UE/LE weakness or paresthesias. Good PO intact.

## 2023-12-22 LAB
AMMONIA PLAS-SCNC: 42 UMOL/L (ref 16–60)
ANION GAP SERPL CALCULATED.3IONS-SCNC: 7 MMOL/L (ref 3–16)
ANION GAP SERPL CALCULATED.3IONS-SCNC: 7 MMOL/L (ref 3–16)
BASOPHILS # BLD: 0 K/UL (ref 0–0.2)
BASOPHILS NFR BLD: 0.2 %
BUN SERPL-MCNC: 28 MG/DL (ref 7–20)
BUN SERPL-MCNC: 30 MG/DL (ref 7–20)
CALCIUM SERPL-MCNC: 7.8 MG/DL (ref 8.3–10.6)
CALCIUM SERPL-MCNC: 7.9 MG/DL (ref 8.3–10.6)
CHLORIDE SERPL-SCNC: 107 MMOL/L (ref 99–110)
CHLORIDE SERPL-SCNC: 107 MMOL/L (ref 99–110)
CO2 SERPL-SCNC: 21 MMOL/L (ref 21–32)
CO2 SERPL-SCNC: 21 MMOL/L (ref 21–32)
CREAT SERPL-MCNC: 1.2 MG/DL (ref 0.9–1.3)
CREAT SERPL-MCNC: 1.2 MG/DL (ref 0.9–1.3)
DEPRECATED RDW RBC AUTO: 15.7 % (ref 12.4–15.4)
DEPRECATED RDW RBC AUTO: 15.7 % (ref 12.4–15.4)
EOSINOPHIL # BLD: 0 K/UL (ref 0–0.6)
EOSINOPHIL NFR BLD: 0.3 %
EST. AVERAGE GLUCOSE BLD GHB EST-MCNC: 119.8 MG/DL
GFR SERPLBLD CREATININE-BSD FMLA CKD-EPI: >60 ML/MIN/{1.73_M2}
GFR SERPLBLD CREATININE-BSD FMLA CKD-EPI: >60 ML/MIN/{1.73_M2}
GLUCOSE BLD-MCNC: 202 MG/DL (ref 70–99)
GLUCOSE BLD-MCNC: 216 MG/DL (ref 70–99)
GLUCOSE BLD-MCNC: 237 MG/DL (ref 70–99)
GLUCOSE BLD-MCNC: 322 MG/DL (ref 70–99)
GLUCOSE SERPL-MCNC: 168 MG/DL (ref 70–99)
GLUCOSE SERPL-MCNC: 195 MG/DL (ref 70–99)
HBA1C MFR BLD: 5.8 %
HCT VFR BLD AUTO: 23.7 % (ref 40.5–52.5)
HCT VFR BLD AUTO: 23.9 % (ref 40.5–52.5)
HGB BLD-MCNC: 8.2 G/DL (ref 13.5–17.5)
HGB BLD-MCNC: 8.5 G/DL (ref 13.5–17.5)
LYMPHOCYTES # BLD: 1.2 K/UL (ref 1–5.1)
LYMPHOCYTES NFR BLD: 12.6 %
MCH RBC QN AUTO: 29.8 PG (ref 26–34)
MCH RBC QN AUTO: 30.5 PG (ref 26–34)
MCHC RBC AUTO-ENTMCNC: 34.3 G/DL (ref 31–36)
MCHC RBC AUTO-ENTMCNC: 35.7 G/DL (ref 31–36)
MCV RBC AUTO: 85.4 FL (ref 80–100)
MCV RBC AUTO: 86.9 FL (ref 80–100)
MONOCYTES # BLD: 1.2 K/UL (ref 0–1.3)
MONOCYTES NFR BLD: 12 %
NEUTROPHILS # BLD: 7.3 K/UL (ref 1.7–7.7)
NEUTROPHILS NFR BLD: 74.9 %
PERFORMED ON: ABNORMAL
PLATELET # BLD AUTO: 61 K/UL (ref 135–450)
PLATELET # BLD AUTO: 63 K/UL (ref 135–450)
PMV BLD AUTO: 8.6 FL (ref 5–10.5)
PMV BLD AUTO: 8.7 FL (ref 5–10.5)
POTASSIUM SERPL-SCNC: 4.1 MMOL/L (ref 3.5–5.1)
POTASSIUM SERPL-SCNC: 4.1 MMOL/L (ref 3.5–5.1)
RBC # BLD AUTO: 2.75 M/UL (ref 4.2–5.9)
RBC # BLD AUTO: 2.77 M/UL (ref 4.2–5.9)
REASON FOR REJECTION: NORMAL
REJECTED TEST: NORMAL
SODIUM SERPL-SCNC: 135 MMOL/L (ref 136–145)
SODIUM SERPL-SCNC: 135 MMOL/L (ref 136–145)
WBC # BLD AUTO: 9.1 K/UL (ref 4–11)
WBC # BLD AUTO: 9.8 K/UL (ref 4–11)

## 2023-12-22 PROCEDURE — 1200000000 HC SEMI PRIVATE

## 2023-12-22 PROCEDURE — 6370000000 HC RX 637 (ALT 250 FOR IP): Performed by: STUDENT IN AN ORGANIZED HEALTH CARE EDUCATION/TRAINING PROGRAM

## 2023-12-22 PROCEDURE — 85027 COMPLETE CBC AUTOMATED: CPT

## 2023-12-22 PROCEDURE — 94640 AIRWAY INHALATION TREATMENT: CPT

## 2023-12-22 PROCEDURE — 80048 BASIC METABOLIC PNL TOTAL CA: CPT

## 2023-12-22 PROCEDURE — 94761 N-INVAS EAR/PLS OXIMETRY MLT: CPT

## 2023-12-22 PROCEDURE — 36592 COLLECT BLOOD FROM PICC: CPT

## 2023-12-22 PROCEDURE — 85025 COMPLETE CBC W/AUTO DIFF WBC: CPT

## 2023-12-22 PROCEDURE — 36415 COLL VENOUS BLD VENIPUNCTURE: CPT

## 2023-12-22 PROCEDURE — 82140 ASSAY OF AMMONIA: CPT

## 2023-12-22 PROCEDURE — 2580000003 HC RX 258: Performed by: STUDENT IN AN ORGANIZED HEALTH CARE EDUCATION/TRAINING PROGRAM

## 2023-12-22 PROCEDURE — 6370000000 HC RX 637 (ALT 250 FOR IP): Performed by: HOSPITALIST

## 2023-12-22 RX ADMIN — EMTRICITABINE 200 MG: 200 CAPSULE ORAL at 08:56

## 2023-12-22 RX ADMIN — INSULIN LISPRO 4 UNITS: 100 INJECTION, SOLUTION INTRAVENOUS; SUBCUTANEOUS at 12:24

## 2023-12-22 RX ADMIN — INSULIN LISPRO 12 UNITS: 100 INJECTION, SOLUTION INTRAVENOUS; SUBCUTANEOUS at 16:12

## 2023-12-22 RX ADMIN — QUETIAPINE FUMARATE 400 MG: 150 TABLET, EXTENDED RELEASE ORAL at 21:25

## 2023-12-22 RX ADMIN — SODIUM CHLORIDE, PRESERVATIVE FREE 10 ML: 5 INJECTION INTRAVENOUS at 21:29

## 2023-12-22 RX ADMIN — MOMETASONE FUROATE AND FORMOTEROL FUMARATE DIHYDRATE 2 PUFF: 100; 5 AEROSOL RESPIRATORY (INHALATION) at 19:27

## 2023-12-22 RX ADMIN — TENOFOVIR ALAFENAMIDE 25 MG: 25 TABLET ORAL at 08:55

## 2023-12-22 RX ADMIN — MOMETASONE FUROATE AND FORMOTEROL FUMARATE DIHYDRATE 2 PUFF: 100; 5 AEROSOL RESPIRATORY (INHALATION) at 08:13

## 2023-12-22 RX ADMIN — SODIUM CHLORIDE, PRESERVATIVE FREE 10 ML: 5 INJECTION INTRAVENOUS at 08:56

## 2023-12-22 RX ADMIN — INSULIN LISPRO 4 UNITS: 100 INJECTION, SOLUTION INTRAVENOUS; SUBCUTANEOUS at 08:56

## 2023-12-22 RX ADMIN — QUETIAPINE FUMARATE 400 MG: 150 TABLET, EXTENDED RELEASE ORAL at 08:55

## 2023-12-22 RX ADMIN — RALTEGRAVIR 400 MG: 400 TABLET, FILM COATED ORAL at 21:25

## 2023-12-22 RX ADMIN — INSULIN GLARGINE 5 UNITS: 100 INJECTION, SOLUTION SUBCUTANEOUS at 21:26

## 2023-12-22 RX ADMIN — METOPROLOL TARTRATE 25 MG: 25 TABLET, FILM COATED ORAL at 21:26

## 2023-12-22 RX ADMIN — RALTEGRAVIR 400 MG: 400 TABLET, FILM COATED ORAL at 08:56

## 2023-12-22 NOTE — CARE COORDINATION
Chart Reviewed. Met with patient and sign other, Eugene Renee who both were sleeping but did engage in conversation. Case Management Assessment  Initial Evaluation    Date/Time of Evaluation: 12/22/2023 1:44 PM  Assessment Completed by: EDWIN Headley    If patient is discharged prior to next notation, then this note serves as note for discharge by case management. Patient Name: Geneva Yap                   YOB: 1970  Diagnosis: Hemorrhagic shock ([address]) [R57.8]  Gastric bleeding [K92.2]                   Date / Time: 12/20/2023  6:55 PM    Patient Admission Status: Inpatient   Readmission Risk (Low < 19, Mod (19-27), High > 27): Readmission Risk Score: 14.9    Current PCP: Akiko Buckner MD  PCP verified by CM? Yes (Dr Inocencia Sainz   last visit was 2-2023)    Chart Reviewed: Yes      History Provided by: Patient, Significant Other David Liu)  Patient Orientation: Alert and Oriented    Patient Cognition: Alert    Hospitalization in the last 30 days (Readmission):  No    If yes, Readmission Assessment in  Navigator will be completed. Advance Directives:      Code Status: Full Code   Patient's Primary Decision Maker is: Legal Next of Kin      Discharge Planning:    Patient lives with: Spouse/Significant Other Type of Home: House  Primary Care Giver: Self  Patient Support Systems include: Spouse/Significant Other   Current Financial resources: Medicaid, Medicare  Current community resources: None  Current services prior to admission: Durable Medical Equipment            Current DME: Reema Darby            Type of Home Care services:  None    ADLS  Prior functional level: Independent in ADLs/IADLs  Current functional level: Independent in ADLs/IADLs    PT AM-PAC:   /24  OT AM-PAC:   /24    Family can provide assistance at DC: Yes  Would you like Case Management to discuss the discharge plan with any other family members/significant others, and if so, who?  No  Plans to Return to Present

## 2023-12-22 NOTE — PLAN OF CARE
Problem: Pain  Goal: Verbalizes/displays adequate comfort level or baseline comfort level  Outcome: Progressing  Flowsheets (Taken 12/21/2023 1901)  Verbalizes/displays adequate comfort level or baseline comfort level:   Encourage patient to monitor pain and request assistance   Consider cultural and social influences on pain and pain management   Assess pain using appropriate pain scale   Implement non-pharmacological measures as appropriate and evaluate response     Problem: Skin/Tissue Integrity  Goal: Absence of new skin breakdown  Description: 1. Monitor for areas of redness and/or skin breakdown  2. Assess vascular access sites hourly  3. Every 4-6 hours minimum:  Change oxygen saturation probe site  4. Every 4-6 hours:  If on nasal continuous positive airway pressure, respiratory therapy assess nares and determine need for appliance change or resting period.   Outcome: Progressing     Problem: Safety - Adult  Goal: Free from fall injury  Outcome: Progressing  Flowsheets (Taken 12/21/2023 1901)  Free From Fall Injury: Instruct family/caregiver on patient safety

## 2023-12-22 NOTE — CARE COORDINATION
12/22/23 1341   Service Assessment   Patient Orientation Alert and Oriented   Cognition Alert   History Provided By Patient;Significant Other  Yonathan Santiago)   Primary Caregiver Self   Support Systems Spouse/Significant Other   Patient's Healthcare Decision Maker is: Legal Next of [facility]   PCP Verified by CM Yes  (Dr Kavita Campoverde   last visit was 2-2023)   Last Visit to PCP Within last year   Prior Functional Level Independent in ADLs/IADLs   Current Functional Level Independent in ADLs/IADLs   Can patient return to prior living arrangement Yes   Ability to make needs known: Good   Family able to assist with home care needs: Yes   Would you like for me to discuss the discharge plan with any other family members/significant others, and if so, who? No   Financial Resources Medicaid; Medicare   Community Resources None   Discharge Planning   Type of Residence House   Living Arrangements Spouse/Significant Other   Current Services Prior To Admission Durable Medical Equipment   Current DME Prior to [employer] Needed N/A   DME Ordered? No   Potential Assistance Purchasing Medications No   Type of Home Care Services None   Patient expects to be discharged to: House   One/Two Story Residence One story   History of falls? 0   Services At/After Discharge   Transition of Care Consult (CM Consult) N/A   Services At/After Discharge None   [employer] Information Provided?  No   Mode of Transport at Discharge Other (see comment)  (Sign other, Yonathan Santiago)

## 2023-12-23 LAB
ANION GAP SERPL CALCULATED.3IONS-SCNC: 7 MMOL/L (ref 3–16)
BUN SERPL-MCNC: 27 MG/DL (ref 7–20)
CALCIUM SERPL-MCNC: 7.7 MG/DL (ref 8.3–10.6)
CHLORIDE SERPL-SCNC: 107 MMOL/L (ref 99–110)
CO2 SERPL-SCNC: 24 MMOL/L (ref 21–32)
CREAT SERPL-MCNC: 0.9 MG/DL (ref 0.9–1.3)
DEPRECATED RDW RBC AUTO: 15.5 % (ref 12.4–15.4)
GFR SERPLBLD CREATININE-BSD FMLA CKD-EPI: >60 ML/MIN/{1.73_M2}
GLUCOSE BLD-MCNC: 196 MG/DL (ref 70–99)
GLUCOSE BLD-MCNC: 198 MG/DL (ref 70–99)
GLUCOSE BLD-MCNC: 211 MG/DL (ref 70–99)
GLUCOSE BLD-MCNC: 228 MG/DL (ref 70–99)
GLUCOSE SERPL-MCNC: 206 MG/DL (ref 70–99)
HCT VFR BLD AUTO: 22.3 % (ref 40.5–52.5)
HCV AB SERPL QL IA: NORMAL
HGB BLD-MCNC: 7.8 G/DL (ref 13.5–17.5)
HIV 1+2 AB+HIV1 P24 AG SERPL QL IA: REACTIVE
HIV 2 AB SERPL QL IA: ABNORMAL
HIV1 AB SERPL QL IA: REACTIVE
HIV1 P24 AG SERPL QL IA: ABNORMAL
MCH RBC QN AUTO: 30.5 PG (ref 26–34)
MCHC RBC AUTO-ENTMCNC: 34.9 G/DL (ref 31–36)
MCV RBC AUTO: 87.2 FL (ref 80–100)
PERFORMED ON: ABNORMAL
PLATELET # BLD AUTO: 57 K/UL (ref 135–450)
PMV BLD AUTO: 8.8 FL (ref 5–10.5)
POTASSIUM SERPL-SCNC: 3.8 MMOL/L (ref 3.5–5.1)
RBC # BLD AUTO: 2.56 M/UL (ref 4.2–5.9)
SODIUM SERPL-SCNC: 138 MMOL/L (ref 136–145)
WBC # BLD AUTO: 5.6 K/UL (ref 4–11)

## 2023-12-23 PROCEDURE — 80048 BASIC METABOLIC PNL TOTAL CA: CPT

## 2023-12-23 PROCEDURE — 6370000000 HC RX 637 (ALT 250 FOR IP): Performed by: STUDENT IN AN ORGANIZED HEALTH CARE EDUCATION/TRAINING PROGRAM

## 2023-12-23 PROCEDURE — 1200000000 HC SEMI PRIVATE

## 2023-12-23 PROCEDURE — 6370000000 HC RX 637 (ALT 250 FOR IP): Performed by: HOSPITALIST

## 2023-12-23 PROCEDURE — 83036 HEMOGLOBIN GLYCOSYLATED A1C: CPT

## 2023-12-23 PROCEDURE — 86701 HIV-1ANTIBODY: CPT

## 2023-12-23 PROCEDURE — 86803 HEPATITIS C AB TEST: CPT

## 2023-12-23 PROCEDURE — 86707 HEPATITIS BE ANTIBODY: CPT

## 2023-12-23 PROCEDURE — 94640 AIRWAY INHALATION TREATMENT: CPT

## 2023-12-23 PROCEDURE — 87390 HIV-1 AG IA: CPT

## 2023-12-23 PROCEDURE — 86702 HIV-2 ANTIBODY: CPT

## 2023-12-23 PROCEDURE — 85027 COMPLETE CBC AUTOMATED: CPT

## 2023-12-23 PROCEDURE — 2580000003 HC RX 258: Performed by: STUDENT IN AN ORGANIZED HEALTH CARE EDUCATION/TRAINING PROGRAM

## 2023-12-23 PROCEDURE — 94760 N-INVAS EAR/PLS OXIMETRY 1: CPT

## 2023-12-23 RX ORDER — QUETIAPINE FUMARATE 50 MG/1
400 TABLET, EXTENDED RELEASE ORAL 2 TIMES DAILY
Status: DISCONTINUED | OUTPATIENT
Start: 2023-12-23 | End: 2023-12-24 | Stop reason: HOSPADM

## 2023-12-23 RX ORDER — METOPROLOL TARTRATE 50 MG/1
50 TABLET, FILM COATED ORAL 2 TIMES DAILY
Status: DISCONTINUED | OUTPATIENT
Start: 2023-12-23 | End: 2023-12-24 | Stop reason: HOSPADM

## 2023-12-23 RX ORDER — INSULIN GLARGINE 100 [IU]/ML
10 INJECTION, SOLUTION SUBCUTANEOUS NIGHTLY
Status: DISCONTINUED | OUTPATIENT
Start: 2023-12-23 | End: 2023-12-24 | Stop reason: HOSPADM

## 2023-12-23 RX ORDER — ALBUTEROL SULFATE 90 UG/1
2 AEROSOL, METERED RESPIRATORY (INHALATION) EVERY 6 HOURS PRN
Status: DISCONTINUED | OUTPATIENT
Start: 2023-12-23 | End: 2023-12-24 | Stop reason: HOSPADM

## 2023-12-23 RX ADMIN — INSULIN GLARGINE 10 UNITS: 100 INJECTION, SOLUTION SUBCUTANEOUS at 21:23

## 2023-12-23 RX ADMIN — METOPROLOL TARTRATE 50 MG: 50 TABLET ORAL at 21:23

## 2023-12-23 RX ADMIN — QUETIAPINE FUMARATE 400 MG: 150 TABLET, EXTENDED RELEASE ORAL at 07:59

## 2023-12-23 RX ADMIN — SODIUM CHLORIDE: 9 INJECTION, SOLUTION INTRAVENOUS at 05:31

## 2023-12-23 RX ADMIN — SODIUM CHLORIDE, PRESERVATIVE FREE 10 ML: 5 INJECTION INTRAVENOUS at 12:43

## 2023-12-23 RX ADMIN — RALTEGRAVIR 400 MG: 400 TABLET, FILM COATED ORAL at 07:59

## 2023-12-23 RX ADMIN — METOPROLOL TARTRATE 25 MG: 25 TABLET, FILM COATED ORAL at 07:59

## 2023-12-23 RX ADMIN — RALTEGRAVIR 400 MG: 400 TABLET, FILM COATED ORAL at 21:22

## 2023-12-23 RX ADMIN — INSULIN LISPRO 4 UNITS: 100 INJECTION, SOLUTION INTRAVENOUS; SUBCUTANEOUS at 07:59

## 2023-12-23 RX ADMIN — SODIUM CHLORIDE, PRESERVATIVE FREE 10 ML: 5 INJECTION INTRAVENOUS at 21:24

## 2023-12-23 RX ADMIN — TENOFOVIR ALAFENAMIDE 25 MG: 25 TABLET ORAL at 07:59

## 2023-12-23 RX ADMIN — EMTRICITABINE 200 MG: 200 CAPSULE ORAL at 07:59

## 2023-12-23 RX ADMIN — MOMETASONE FUROATE AND FORMOTEROL FUMARATE DIHYDRATE 2 PUFF: 100; 5 AEROSOL RESPIRATORY (INHALATION) at 09:10

## 2023-12-23 RX ADMIN — INSULIN LISPRO 4 UNITS: 100 INJECTION, SOLUTION INTRAVENOUS; SUBCUTANEOUS at 12:43

## 2023-12-23 RX ADMIN — QUETIAPINE FUMARATE 400 MG: 50 TABLET, EXTENDED RELEASE ORAL at 21:22

## 2023-12-23 NOTE — PLAN OF CARE
Problem: Pain  Goal: Verbalizes/displays adequate comfort level or baseline comfort level  12/23/2023 0905 by Lilliana Swartz RN  Outcome: Progressing       Problem: Skin/Tissue Integrity  Goal: Absence of new skin breakdown  Description: 1. Monitor for areas of redness and/or skin breakdown  2. Assess vascular access sites hourly  3. Every 4-6 hours minimum:  Change oxygen saturation probe site  4. Every 4-6 hours:  If on nasal continuous positive airway pressure, respiratory therapy assess nares and determine need for appliance change or resting period.   12/23/2023 0905 by Lilliana Swartz RN  Outcome: Progressing    Problem: Safety - Adult  Goal: Free from fall injury  12/23/2023 0905 by Lilliana Swartz RN  Outcome: Progressing    Problem: Discharge Planning  Goal: Discharge to home or other facility with appropriate resources  12/23/2023 0905 by Lilliana Swartz RN  Outcome: Progressing

## 2023-12-24 VITALS
OXYGEN SATURATION: 97 % | TEMPERATURE: 98.1 F | RESPIRATION RATE: 14 BRPM | BODY MASS INDEX: 33.13 KG/M2 | HEIGHT: 73 IN | SYSTOLIC BLOOD PRESSURE: 128 MMHG | HEART RATE: 107 BPM | DIASTOLIC BLOOD PRESSURE: 73 MMHG | WEIGHT: 250 LBS

## 2023-12-24 LAB
ANION GAP SERPL CALCULATED.3IONS-SCNC: 8 MMOL/L (ref 3–16)
BUN SERPL-MCNC: 22 MG/DL (ref 7–20)
CALCIUM SERPL-MCNC: 7.8 MG/DL (ref 8.3–10.6)
CHLORIDE SERPL-SCNC: 112 MMOL/L (ref 99–110)
CO2 SERPL-SCNC: 23 MMOL/L (ref 21–32)
CREAT SERPL-MCNC: 0.7 MG/DL (ref 0.9–1.3)
DEPRECATED RDW RBC AUTO: 15.7 % (ref 12.4–15.4)
EST. AVERAGE GLUCOSE BLD GHB EST-MCNC: 111.2 MG/DL
GFR SERPLBLD CREATININE-BSD FMLA CKD-EPI: >60 ML/MIN/{1.73_M2}
GLUCOSE BLD-MCNC: 158 MG/DL (ref 70–99)
GLUCOSE BLD-MCNC: 163 MG/DL (ref 70–99)
GLUCOSE SERPL-MCNC: 146 MG/DL (ref 70–99)
HBA1C MFR BLD: 5.5 %
HBV E AB SERPL QL IA: NEGATIVE
HCT VFR BLD AUTO: 23.5 % (ref 40.5–52.5)
HGB BLD-MCNC: 7.9 G/DL (ref 13.5–17.5)
MCH RBC QN AUTO: 29.9 PG (ref 26–34)
MCHC RBC AUTO-ENTMCNC: 33.7 G/DL (ref 31–36)
MCV RBC AUTO: 88.7 FL (ref 80–100)
PERFORMED ON: ABNORMAL
PERFORMED ON: ABNORMAL
PLATELET # BLD AUTO: 67 K/UL (ref 135–450)
PMV BLD AUTO: 8.7 FL (ref 5–10.5)
POTASSIUM SERPL-SCNC: 3.9 MMOL/L (ref 3.5–5.1)
RBC # BLD AUTO: 2.64 M/UL (ref 4.2–5.9)
SODIUM SERPL-SCNC: 143 MMOL/L (ref 136–145)
WBC # BLD AUTO: 4.2 K/UL (ref 4–11)

## 2023-12-24 PROCEDURE — 6370000000 HC RX 637 (ALT 250 FOR IP): Performed by: HOSPITALIST

## 2023-12-24 PROCEDURE — 6370000000 HC RX 637 (ALT 250 FOR IP): Performed by: STUDENT IN AN ORGANIZED HEALTH CARE EDUCATION/TRAINING PROGRAM

## 2023-12-24 PROCEDURE — 97530 THERAPEUTIC ACTIVITIES: CPT

## 2023-12-24 PROCEDURE — 97116 GAIT TRAINING THERAPY: CPT

## 2023-12-24 PROCEDURE — 2580000003 HC RX 258: Performed by: STUDENT IN AN ORGANIZED HEALTH CARE EDUCATION/TRAINING PROGRAM

## 2023-12-24 PROCEDURE — 80048 BASIC METABOLIC PNL TOTAL CA: CPT

## 2023-12-24 PROCEDURE — 85027 COMPLETE CBC AUTOMATED: CPT

## 2023-12-24 PROCEDURE — 97161 PT EVAL LOW COMPLEX 20 MIN: CPT

## 2023-12-24 RX ADMIN — SODIUM CHLORIDE, PRESERVATIVE FREE 10 ML: 5 INJECTION INTRAVENOUS at 08:07

## 2023-12-24 RX ADMIN — TENOFOVIR ALAFENAMIDE 25 MG: 25 TABLET ORAL at 08:04

## 2023-12-24 RX ADMIN — SODIUM CHLORIDE: 9 INJECTION, SOLUTION INTRAVENOUS at 02:30

## 2023-12-24 RX ADMIN — RALTEGRAVIR 400 MG: 400 TABLET, FILM COATED ORAL at 08:04

## 2023-12-24 RX ADMIN — EMTRICITABINE 200 MG: 200 CAPSULE ORAL at 08:04

## 2023-12-24 RX ADMIN — QUETIAPINE FUMARATE 400 MG: 50 TABLET, EXTENDED RELEASE ORAL at 08:03

## 2023-12-24 RX ADMIN — METOPROLOL TARTRATE 50 MG: 50 TABLET ORAL at 08:05

## 2023-12-24 NOTE — PLAN OF CARE
Problem: Pain  Goal: Verbalizes/displays adequate comfort level or baseline comfort level  Outcome: Progressing     Problem: Skin/Tissue Integrity  Goal: Absence of new skin breakdown  Description: 1. Monitor for areas of redness and/or skin breakdown  2. Assess vascular access sites hourly  3. Every 4-6 hours minimum:  Change oxygen saturation probe site  4. Every 4-6 hours:  If on nasal continuous positive airway pressure, respiratory therapy assess nares and determine need for appliance change or resting period.   Outcome: Progressing     Problem: Safety - Adult  Goal: Free from fall injury  Outcome: Progressing     Problem: Discharge Planning  Goal: Discharge to home or other facility with appropriate resources  Outcome: Progressing     Problem: Respiratory - Adult  Goal: Achieves optimal ventilation and oxygenation  Outcome: Progressing     Problem: Cardiovascular - Adult  Goal: Maintains optimal cardiac output and hemodynamic stability  Outcome: Progressing     Problem: Skin/Tissue Integrity - Adult  Goal: Skin integrity remains intact  Outcome: Progressing  Goal: Incisions, wounds, or drain sites healing without S/S of infection  Outcome: Progressing  Goal: Oral mucous membranes remain intact  Outcome: Progressing     Problem: Gastrointestinal - Adult  Goal: Minimal or absence of nausea and vomiting  Outcome: Progressing  Goal: Maintains or returns to baseline bowel function  Outcome: Progressing  Goal: Maintains adequate nutritional intake  Outcome: Progressing     Problem: Hematologic - Adult  Goal: Maintains hematologic stability  Outcome: Progressing

## 2023-12-26 NOTE — DISCHARGE SUMMARY
Hospital Medicine Discharge Summary      Patient ID: Jolene Carpenter , [id number]     Patient's PCP: Lissette Blas MD    Admit Date: 12/20/2023     Discharge Date: 12/24/2023      Admitting Physician: Fidel Norris DO    Discharge Physician: Ghulam Bro MD     Discharge Diagnoses: Active Hospital Problems    Diagnosis Date Noted    Bleeding from colostomy stoma [facility]) [K94.01] 11/17/2022     Priority: Medium    Hemorrhagic shock ([address]) [R57.8] 12/21/2023    DEISI (acute kidney injury) ([address]) [N17.9] 12/20/2023    Hydronephrosis [N13.30] 12/20/2023    Gastroparesis [K31.84] 12/20/2023    Type 2 diabetes mellitus, without long-term current use of insulin ([address]) [E11.9] 12/20/2023    Cirrhosis ([address]) [K74.60] 12/20/2023    Splenomegaly [R16.1] 12/20/2023    HIV (human immunodeficiency virus infection) ([address]) [B20] 12/20/2023    Obesity [E66.9] 12/20/2023    Gastric bleeding [K92.2] 12/20/2023         The patient was seen and examined on the day of discharge and this discharge summary is in conjunction with any daily progress note from day of discharge. HOSPITAL COURSE  Patient demographics:  The patient  Jolene Carpenter is a 46 y.o. male      Significant past medical history:       Patient Active Problem List   Diagnosis    Bleeding from colostomy stoma ([address])    DEISI (acute kidney injury) ([address])    Hydronephrosis    Gastroparesis    Type 2 diabetes mellitus, without long-term current use of insulin ([address])    Cirrhosis ([address])    Splenomegaly    HIV (human immunodeficiency virus infection) ([address])    Obesity    Gastric bleeding    Hemorrhagic shock ([address])            Presenting symptoms:  bleeding stoma     Diagnostic workup:   CTA ABDOMEN PELVIS W WO CONTRAST       CONSULTS DURING ADMISSION :   IP CONSULT TO CRITICAL CARE        Patient was diagnosed with:  Bleeding from colostomy stoma  DEISI  Gastroparesis  Cirhosis with portal hypertension and spleenomegaly.         Treatment while inpatient:  Jolene Carpenter

## 2023-12-29 LAB
HIV INTERPRETATION: ABNORMAL
HIV-1 ANTIBODY: POSITIVE
HIV-2 AB: NEGATIVE

## 2024-01-15 ENCOUNTER — OFFICE VISIT (OUTPATIENT)
Dept: SURGERY | Age: 54
End: 2024-01-15

## 2024-01-15 VITALS
SYSTOLIC BLOOD PRESSURE: 86 MMHG | BODY MASS INDEX: 32.98 KG/M2 | HEIGHT: 73 IN | DIASTOLIC BLOOD PRESSURE: 51 MMHG | HEART RATE: 130 BPM

## 2024-01-15 DIAGNOSIS — K94.01 BLEEDING FROM COLOSTOMY STOMA (HCC): Primary | ICD-10-CM

## 2024-01-15 NOTE — PATIENT INSTRUCTIONS
Ligation of varicose vein of colostomy scheduled for 1/24/24 at 9 arrive at 7:30.  Nothing to eat or drink after midnight. You will need someone to bring you home. 1/2 dose of insulin the night before surgery. No diabetic medication the morning of surgery.

## 2024-01-16 ENCOUNTER — TELEPHONE (OUTPATIENT)
Dept: SURGERY | Age: 54
End: 2024-01-16

## 2024-01-16 NOTE — TELEPHONE ENCOUNTER
Ligation of varicose vein of colostomy scheduled for 1/24/24 at 9 arrive at 7:30.       Will have to ask Yony if PICC line can or will be advised.

## 2024-01-16 NOTE — TELEPHONE ENCOUNTER
PT would like to know if it is possible to have a pic line to access a vein for his procedure.  Call and advise.

## 2024-01-17 RX ORDER — INSULIN ASPART 100 [IU]/ML
INJECTION, SOLUTION INTRAVENOUS; SUBCUTANEOUS 3 TIMES DAILY
COMMUNITY

## 2024-01-17 RX ORDER — TESTOSTERONE GEL, 1% 10 MG/G
50 GEL TRANSDERMAL DAILY
COMMUNITY
Start: 2023-12-28 | End: 2024-03-27

## 2024-01-17 NOTE — PROGRESS NOTES
[facility] PRE-OPERATIVE INSTRUCTIONS    Day of Procedure:  1/24/24              Arrival time:  0725              Surgery time: 0855    Take the following medications with a sip of water:  Follow your MD/Surgeons pre-procedure instructions regarding your medications     Do not eat or drink anything after 12:00 midnight prior to your surgery.  This includes water chewing gum, mints and ice chips.   You may brush your teeth and gargle the morning of your surgery, but do not swallow the water     Please see your family doctor/pediatrician for a history and physical and/or concerning medications.   Bring any test results/reports from your physicians office.   If you are under the care of a heart doctor or specialist doctor, please be aware that you may be asked to them for clearance    You may be asked to stop blood thinners such as Coumadin, Plavix, Fragmin, Lovenox, etc., or any anti-inflammatories such as:  Aspirin, Ibuprofen, Advil, Naproxen prior to your surgery.    We also ask that you stop any OTC medications such as fish oil, vitamin E, glucosamine, garlic, Multivitamins, COQ 10, etc.     We ask that you do not smoke 24 hours prior to surgery  We ask that you do not  drink any alcoholic beverages 24 hours prior to surgery     You must make arrangements for a responsible adult to take you home after your surgery.    For your safety you will not be allowed to leave alone or drive yourself home.  Your surgery will be cancelled if you do not have a ride home.     Also for your safety, it is strongly suggested that someone stay with you the first 24 hours after your surgery.     A parent or legal guardian must accompany a child scheduled for surgery and plan to stay at the hospital until the child is discharged.    Please do not bring other children with you.    For your comfort, please wear simple loose fitting clothing to the hospital.  Please do not bring valuables.    Do not wear any make-up or nail

## 2024-01-17 NOTE — PROGRESS NOTES
[facility] Pre-Admission Testing Electronic Communication Worksheet for OR/ENDO Procedures        Patient: Austen Barahona    DOS: 1/24/24    Arrival Time: 7:25AM    Surgery Time: 8:55AM    Meds to Bed:  [x] YES    []  NO    Transportation Confirmed: [x] YES    []  NO GIRLFRIEND IRAJ    History and Physical:  [] YES    []  NO  [x] N/A  If yes, please list doctor or Urgent Care and date of H&P: DR. SANTOS WILL DO H&P    Additional Clearance(Cardiac, Pulmonary, etc):  [] YES    [x]  NO    Pre-Admission Testing Visit:  [] YES    [x]  NO If no, do labs/testing need to be done DOS?  [] YES    [x]  NO    Medication Reconciliation Complete:  [x] YES    []  NO        Additional Notes:    PT HAS CGM AND A PORT (PORT IS NOT WORKING)    PATIENT STATES HE IS A VERY DIFFICULT IV STICK             Interview Complete: [x] YES    []  NO          Krsital Bernal RN  3:10 PM

## 2024-01-17 NOTE — PROGRESS NOTES
Follow Up Prior to Surgery    DOS: 24  :1970          Bianka Weems:   PATIENT STATES HE IS A VERY DIFFICULT IV STICK PER PATIENT HE HAS NO IV ACCESS-PATIENT HAS A PORT WHICH IS NONFUNCTIONING. PATIENT ASKED DR. SANTOS IF HE CAN GET A PIIC LINE PLACED-PAT RN CALLED DR. SANTOS OFFICE AND SPOKE TO STEPHENIE-PER STEPHENIE SANTOS SAID NO PICC LINE                                   Surgeon's Name: DANIELLE

## 2024-01-18 NOTE — PROGRESS NOTES
Austen Barahona (:  1970) is a 53 y.o. male,Established patient, here for evaluation of the following chief complaint(s):  Follow-up (Pt is here today for a followup hospital stay. )         ASSESSMENT/PLAN:  1. Bleeding from colostomy stoma (HCC)    OR for ligation of prominent venous sinus near stoma         Subjective   SUBJECTIVE/OBJECTIVE:  HPI  Patient recently treated for excessive bleeding at stoma. CT at that time shows a dominant venous sinus along the caudal side of the stoma which as the likely source. Recommend ligation. The risks, benefits and alternatives to the planned procedure were discussed. Patient expressed an understanding and is willing to proceed.      Review of Systems       Objective   Physical Exam       Electronically signed by ENE SANTOS MD on 2024 at 4:12 PM        An electronic signature was used to authenticate this note.    --ENE SANTOS MD

## 2024-01-23 ENCOUNTER — ANESTHESIA EVENT (OUTPATIENT)
Dept: OPERATING ROOM | Age: 54
End: 2024-01-23
Payer: MEDICARE

## 2024-01-24 ENCOUNTER — HOSPITAL ENCOUNTER (OUTPATIENT)
Age: 54
Setting detail: OUTPATIENT SURGERY
Discharge: HOME | End: 2024-01-24
Attending: SURGERY | Admitting: SURGERY
Payer: MEDICARE

## 2024-01-24 ENCOUNTER — ANESTHESIA (OUTPATIENT)
Dept: OPERATING ROOM | Age: 54
End: 2024-01-24
Payer: MEDICARE

## 2024-01-24 VITALS
RESPIRATION RATE: 14 BRPM | DIASTOLIC BLOOD PRESSURE: 89 MMHG | BODY MASS INDEX: 33.4 KG/M2 | WEIGHT: 252 LBS | OXYGEN SATURATION: 98 % | HEIGHT: 73 IN | SYSTOLIC BLOOD PRESSURE: 146 MMHG | TEMPERATURE: 97.2 F | HEART RATE: 99 BPM

## 2024-01-24 DIAGNOSIS — K94.01 BLEEDING FROM COLOSTOMY STOMA (HCC): Primary | ICD-10-CM

## 2024-01-24 LAB
GLUCOSE BLD-MCNC: 189 MG/DL (ref 70–99)
GLUCOSE BLD-MCNC: 233 MG/DL (ref 70–99)
PERFORMED ON: ABNORMAL
PERFORMED ON: ABNORMAL

## 2024-01-24 PROCEDURE — 6360000002 HC RX W HCPCS: Performed by: SURGERY

## 2024-01-24 PROCEDURE — 7100000011 HC PHASE II RECOVERY - ADDTL 15 MIN: Performed by: SURGERY

## 2024-01-24 PROCEDURE — 2580000003 HC RX 258: Performed by: SURGERY

## 2024-01-24 PROCEDURE — 2709999900 HC NON-CHARGEABLE SUPPLY: Performed by: SURGERY

## 2024-01-24 PROCEDURE — 2500000003 HC RX 250 WO HCPCS

## 2024-01-24 PROCEDURE — 3600000004 HC SURGERY LEVEL 4 BASE: Performed by: SURGERY

## 2024-01-24 PROCEDURE — 7100000001 HC PACU RECOVERY - ADDTL 15 MIN: Performed by: SURGERY

## 2024-01-24 PROCEDURE — 2580000003 HC RX 258: Performed by: ANESTHESIOLOGY

## 2024-01-24 PROCEDURE — 6360000002 HC RX W HCPCS

## 2024-01-24 PROCEDURE — 3600000014 HC SURGERY LEVEL 4 ADDTL 15MIN: Performed by: SURGERY

## 2024-01-24 PROCEDURE — 6370000000 HC RX 637 (ALT 250 FOR IP): Performed by: ANESTHESIOLOGY

## 2024-01-24 PROCEDURE — A4217 STERILE WATER/SALINE, 500 ML: HCPCS | Performed by: SURGERY

## 2024-01-24 PROCEDURE — 7100000000 HC PACU RECOVERY - FIRST 15 MIN: Performed by: SURGERY

## 2024-01-24 PROCEDURE — 7100000010 HC PHASE II RECOVERY - FIRST 15 MIN: Performed by: SURGERY

## 2024-01-24 PROCEDURE — 3700000000 HC ANESTHESIA ATTENDED CARE: Performed by: SURGERY

## 2024-01-24 PROCEDURE — 3700000001 HC ADD 15 MINUTES (ANESTHESIA): Performed by: SURGERY

## 2024-01-24 PROCEDURE — 37660 LIGATION COMMON ILIAC VEIN: CPT | Performed by: SURGERY

## 2024-01-24 RX ORDER — SODIUM CHLORIDE 9 MG/ML
INJECTION, SOLUTION INTRAVENOUS PRN
Status: DISCONTINUED | OUTPATIENT
Start: 2024-01-24 | End: 2024-01-24 | Stop reason: HOSPADM

## 2024-01-24 RX ORDER — DEXAMETHASONE SODIUM PHOSPHATE 4 MG/ML
INJECTION, SOLUTION INTRA-ARTICULAR; INTRALESIONAL; INTRAMUSCULAR; INTRAVENOUS; SOFT TISSUE PRN
Status: DISCONTINUED | OUTPATIENT
Start: 2024-01-24 | End: 2024-01-24 | Stop reason: SDUPTHER

## 2024-01-24 RX ORDER — BUPIVACAINE HYDROCHLORIDE 5 MG/ML
INJECTION, SOLUTION EPIDURAL; INTRACAUDAL
Status: COMPLETED | OUTPATIENT
Start: 2024-01-24 | End: 2024-01-24

## 2024-01-24 RX ORDER — SODIUM CHLORIDE 0.9 % (FLUSH) 0.9 %
5-40 SYRINGE (ML) INJECTION PRN
Status: DISCONTINUED | OUTPATIENT
Start: 2024-01-24 | End: 2024-01-24 | Stop reason: HOSPADM

## 2024-01-24 RX ORDER — ONDANSETRON 2 MG/ML
INJECTION INTRAMUSCULAR; INTRAVENOUS PRN
Status: DISCONTINUED | OUTPATIENT
Start: 2024-01-24 | End: 2024-01-24 | Stop reason: SDUPTHER

## 2024-01-24 RX ORDER — PROPOFOL 10 MG/ML
INJECTION, EMULSION INTRAVENOUS PRN
Status: DISCONTINUED | OUTPATIENT
Start: 2024-01-24 | End: 2024-01-24 | Stop reason: SDUPTHER

## 2024-01-24 RX ORDER — LIDOCAINE HYDROCHLORIDE 20 MG/ML
INJECTION, SOLUTION EPIDURAL; INFILTRATION; INTRACAUDAL; PERINEURAL PRN
Status: DISCONTINUED | OUTPATIENT
Start: 2024-01-24 | End: 2024-01-24 | Stop reason: SDUPTHER

## 2024-01-24 RX ORDER — FENTANYL CITRATE 0.05 MG/ML
25 INJECTION, SOLUTION INTRAMUSCULAR; INTRAVENOUS EVERY 5 MIN PRN
Status: DISCONTINUED | OUTPATIENT
Start: 2024-01-24 | End: 2024-01-24 | Stop reason: HOSPADM

## 2024-01-24 RX ORDER — ROCURONIUM BROMIDE 10 MG/ML
INJECTION, SOLUTION INTRAVENOUS PRN
Status: DISCONTINUED | OUTPATIENT
Start: 2024-01-24 | End: 2024-01-24 | Stop reason: SDUPTHER

## 2024-01-24 RX ORDER — ONDANSETRON 2 MG/ML
4 INJECTION INTRAMUSCULAR; INTRAVENOUS
Status: DISCONTINUED | OUTPATIENT
Start: 2024-01-24 | End: 2024-01-24 | Stop reason: HOSPADM

## 2024-01-24 RX ORDER — SODIUM CHLORIDE 0.9 % (FLUSH) 0.9 %
5-40 SYRINGE (ML) INJECTION EVERY 12 HOURS SCHEDULED
Status: DISCONTINUED | OUTPATIENT
Start: 2024-01-24 | End: 2024-01-24 | Stop reason: HOSPADM

## 2024-01-24 RX ORDER — FENTANYL CITRATE 0.05 MG/ML
50 INJECTION, SOLUTION INTRAMUSCULAR; INTRAVENOUS EVERY 5 MIN PRN
Status: DISCONTINUED | OUTPATIENT
Start: 2024-01-24 | End: 2024-01-24 | Stop reason: HOSPADM

## 2024-01-24 RX ORDER — OXYCODONE HYDROCHLORIDE 5 MG/1
5 TABLET ORAL
Status: COMPLETED | OUTPATIENT
Start: 2024-01-24 | End: 2024-01-24

## 2024-01-24 RX ORDER — OXYCODONE HYDROCHLORIDE 5 MG/1
5 TABLET ORAL EVERY 6 HOURS PRN
Qty: 28 TABLET | Refills: 0 | Status: SHIPPED | OUTPATIENT
Start: 2024-01-24 | End: 2024-01-31

## 2024-01-24 RX ORDER — SUCCINYLCHOLINE/SOD CL,ISO/PF 200MG/10ML
SYRINGE (ML) INTRAVENOUS PRN
Status: DISCONTINUED | OUTPATIENT
Start: 2024-01-24 | End: 2024-01-24 | Stop reason: SDUPTHER

## 2024-01-24 RX ORDER — FENTANYL CITRATE 50 UG/ML
INJECTION, SOLUTION INTRAMUSCULAR; INTRAVENOUS PRN
Status: DISCONTINUED | OUTPATIENT
Start: 2024-01-24 | End: 2024-01-24 | Stop reason: SDUPTHER

## 2024-01-24 RX ORDER — MEPERIDINE HYDROCHLORIDE 25 MG/ML
12.5 INJECTION INTRAMUSCULAR; INTRAVENOUS; SUBCUTANEOUS EVERY 5 MIN PRN
Status: DISCONTINUED | OUTPATIENT
Start: 2024-01-24 | End: 2024-01-24 | Stop reason: HOSPADM

## 2024-01-24 RX ORDER — MAGNESIUM HYDROXIDE 1200 MG/15ML
LIQUID ORAL CONTINUOUS PRN
Status: DISCONTINUED | OUTPATIENT
Start: 2024-01-24 | End: 2024-01-24 | Stop reason: HOSPADM

## 2024-01-24 RX ADMIN — DEXAMETHASONE SODIUM PHOSPHATE 8 MG: 4 INJECTION, SOLUTION INTRAMUSCULAR; INTRAVENOUS at 09:09

## 2024-01-24 RX ADMIN — FENTANYL CITRATE 100 MCG: 50 INJECTION INTRAMUSCULAR; INTRAVENOUS at 09:04

## 2024-01-24 RX ADMIN — Medication 180 MG: at 09:04

## 2024-01-24 RX ADMIN — ONDANSETRON 4 MG: 2 INJECTION INTRAMUSCULAR; INTRAVENOUS at 09:51

## 2024-01-24 RX ADMIN — ROCURONIUM BROMIDE 10 MG: 10 INJECTION INTRAVENOUS at 09:24

## 2024-01-24 RX ADMIN — OXYCODONE 5 MG: 5 TABLET ORAL at 11:06

## 2024-01-24 RX ADMIN — FENTANYL CITRATE 50 MCG: 50 INJECTION INTRAMUSCULAR; INTRAVENOUS at 09:17

## 2024-01-24 RX ADMIN — SODIUM CHLORIDE: 9 INJECTION, SOLUTION INTRAVENOUS at 07:49

## 2024-01-24 RX ADMIN — FENTANYL CITRATE 50 MCG: 50 INJECTION INTRAMUSCULAR; INTRAVENOUS at 09:42

## 2024-01-24 RX ADMIN — LIDOCAINE HYDROCHLORIDE 100 MG: 20 INJECTION, SOLUTION EPIDURAL; INFILTRATION; INTRACAUDAL; PERINEURAL at 09:04

## 2024-01-24 RX ADMIN — ROCURONIUM BROMIDE 10 MG: 10 INJECTION INTRAVENOUS at 09:04

## 2024-01-24 RX ADMIN — SUGAMMADEX 200 MG: 100 INJECTION, SOLUTION INTRAVENOUS at 10:06

## 2024-01-24 RX ADMIN — ROCURONIUM BROMIDE 20 MG: 10 INJECTION INTRAVENOUS at 09:09

## 2024-01-24 RX ADMIN — PROPOFOL 200 MG: 10 INJECTION, EMULSION INTRAVENOUS at 09:04

## 2024-01-24 RX ADMIN — SODIUM CHLORIDE 2000 MG: 900 INJECTION INTRAVENOUS at 08:59

## 2024-01-24 ASSESSMENT — PAIN DESCRIPTION - LOCATION: LOCATION: ABDOMEN

## 2024-01-24 ASSESSMENT — PAIN DESCRIPTION - DESCRIPTORS: DESCRIPTORS: SORE

## 2024-01-24 ASSESSMENT — LIFESTYLE VARIABLES: SMOKING_STATUS: 0

## 2024-01-24 ASSESSMENT — PAIN - FUNCTIONAL ASSESSMENT
PAIN_FUNCTIONAL_ASSESSMENT: PREVENTS OR INTERFERES SOME ACTIVE ACTIVITIES AND ADLS
PAIN_FUNCTIONAL_ASSESSMENT: 0-10

## 2024-01-24 ASSESSMENT — PAIN SCALES - GENERAL: PAINLEVEL_OUTOF10: 6

## 2024-01-24 ASSESSMENT — PAIN DESCRIPTION - ORIENTATION: ORIENTATION: ANTERIOR

## 2024-01-24 NOTE — DISCHARGE INSTRUCTIONS
Follow up in 2-3 weeks  Call [phone] for an appointment  Remove dressings in 3-4 days  Ok to shower in AM  No Driving for 2 days. OK to drive at that time if you are not taking any pain medication.

## 2024-01-24 NOTE — BRIEF OP NOTE
Brief Postoperative Note      Patient: Austen Barahona  YOB: 1970  MRN: [id number]    Date of Procedure: 1/24/2024    Pre-Op Diagnosis Codes:     * Bleeding from colostomy stoma (HCC) [K94.01]    Post-Op Diagnosis: Same       Procedure(s):  LIGATION OF VARICOSE VEIN OF COLOSTOMY    Surgeon(s):  Dayne Santos MD    Assistant:  Surgical Assistant: Gisel Avalos    Anesthesia: General    Estimated Blood Loss (mL): less than 50     Complications: None    Specimens:   * No specimens in log *    Implants:  * No implants in log *      Drains:   Colostomy LLQ (Active)       Findings: dilated vein along lateral, inferior aspect of colostomy      Electronically signed by DAYNE SANTOS MD on 1/24/2024 at 10:08 AM

## 2024-01-24 NOTE — ANESTHESIA POSTPROCEDURE EVALUATION
Department of Anesthesiology  Postprocedure Note    Patient: Austen Barahona  MRN: [id number]  YOB: 1970  Date of evaluation: 1/24/2024    Procedure Summary       Date: 01/24/24 Room / Location: [address]    Anesthesia Start: 0859 Anesthesia Stop: 1019    Procedure: LIGATION OF VARICOSE VEIN OF COLOSTOMY (Abdomen) Diagnosis:       Bleeding from colostomy stoma (HCC)      (Bleeding from colostomy stoma (HCC) [K94.01])    Surgeons: Dayne Bhakta MD Responsible Provider: Tyler Bautista MD    Anesthesia Type: general ASA Status: 3            Anesthesia Type: No value filed.    Helene Phase I: Helene Score: 10    Helene Phase II: Helene Score: 10    Anesthesia Post Evaluation    Patient location during evaluation: PACU  Patient participation: complete - patient participated  Level of consciousness: awake  Pain score: 2  Airway patency: patent  Nausea & Vomiting: no nausea and no vomiting  Cardiovascular status: blood pressure returned to baseline  Respiratory status: acceptable  Hydration status: euvolemic  Pain management: adequate    No notable events documented.

## 2024-01-24 NOTE — OP NOTE
[facility]           [address] [place] [address]                                OPERATIVE REPORT    PATIENT NAME: JAMIR CEBALLOS                     :        1970  MED REC NO:   [id number]                          ROOM:  ACCOUNT NO:   [id number]                           ADMIT DATE: 2024  PROVIDER:     Dayne Bhakta MD    DATE OF PROCEDURE:  2024    PREOPERATIVE DIAGNOSES:  History of hemorrhagic shock from stoma and  bleeding due to portal hypertension.    POSTOPERATIVE DIAGNOSES:  History of hemorrhagic shock from stoma and  bleeding due to portal hypertension.    OPERATION PERFORMED:  Ligation of crissy stomal varicosity    SURGEON:  Dayne Bhakta MD    SPECIMEN:  None.    ESTIMATED BLOOD LOSS:  Less than 50 mL.    COMPLICATIONS:  None.    DISPOSITION:  To Recovery in stable condition.    INDICATIONS:  The patient is a 53-year-old male with a longstanding left  lower quadrant colostomy.  He recently presented with significant  bleeding, which caused acute blood loss anemia and hemorrhagic shock.   He was treated with transfusion and suture ligation at the site of the  bleeding.  CT scanning during that admission revealed a large dominant  varicosity extending along the inferior lateral quadrant of the ostomy.   It was felt this was the likely source of his bleeding and ligation was  recommended in a hope of preventing future bleeding episodes.  He does  have portal hypertension with other varicosities noted, but no other  evidence of bleeding.  The risks, benefits, and alternatives were  reviewed including the possibility bleeding could return even with this  procedure, which will require additional intervention.  The patient  understood and was willing to proceed.    OPERATIVE PROCEDURE:  The patient was brought to the operating room and  placed supine.  General anesthesia intubation performed and the left  lower quadrant

## 2024-01-24 NOTE — ANESTHESIA PRE PROCEDURE
[facility] Department of Anesthesiology  Pre-Anesthesia Evaluation/Consultation       Name:  Austen Barahona  : 1970  Age:  53 y.o.                                           MRN:  [id number]  Date: 2024           Surgeon: Surgeon(s):  Dayne Bhakta MD    Procedure: Procedure(s):  LIGATION OF VARICOSE VEIN OF COLOSTOMY     No Known Allergies  Patient Active Problem List   Diagnosis    Bleeding from colostomy stoma (HCC)    DEISI (acute kidney injury) (HCC)    Hydronephrosis    Gastroparesis    Type 2 diabetes mellitus, without long-term current use of insulin (HCC)    Cirrhosis (HCC)    Splenomegaly    HIV (human immunodeficiency virus infection) (HCC)    Obesity    Gastric bleeding    Hemorrhagic shock (HCC)     Past Medical History:   Diagnosis Date    Anal cancer (HCC)     Anxiety and depression     Bipolar 1 disorder (HCC)     Colostomy in place (HCC)     COPD (chronic obstructive pulmonary disease) (HCC)     Diabetes mellitus (HCC)     Hepatitis B     HIV (human immunodeficiency virus infection) (HCC)     Hyperlipidemia     Hypertension     Nonalcoholic fatty liver disease     FROM HIV MEDS     Past Surgical History:   Procedure Laterality Date    COLONOSCOPY      COLOSTOMY      ILEOSCOPY N/A 2022    ILEOSCOPY DX ONLY performed by Rickey Hurt Jr., DO at [facility] ENDOSCOPY    PORT SURGERY      TOTAL HIP ARTHROPLASTY Left     UPPER GASTROINTESTINAL ENDOSCOPY N/A 2022    EGD BIOPSY performed by Rickey Hurt Jr., DO at [facility] ENDOSCOPY     Social History     Tobacco Use    Smoking status: Every Day     Current packs/day: 1.00     Average packs/day: 1 pack/day for 41.0 years (41.0 ttl pk-yrs)     Types: Cigarettes    Smokeless tobacco: Never   Vaping Use    Vaping Use: Never used   Substance Use Topics    Alcohol use: Never    Drug use: Not Currently     Medications  No current facility-administered medications on file prior to encounter.     Current Outpatient Medications

## 2024-01-24 NOTE — PROGRESS NOTES
To pacu from OR. PT asleep, wakes easily. Denies pain.  Skin glue to abd below ostomy dry and intact.  Abd rounded and soft.  Ostomy bag intact.  IV infusing.  Monitor in sinus tachycardia.

## 2024-01-24 NOTE — H&P
PATIENT NAME: Austen Barahona   YOB: 1970    ADMISSION DATE: 1/24/2024  7:15 AM      TODAY'S DATE: 1/24/2024    CHIEF COMPLAINT:  bleeding from stoma      HISTORY OF PRESENT ILLNESS:  The patient is a 53 y.o. male  who presents with history of significant bleeding from a chronic colostomy. Recently admitted with acute blood loss anemia and shock from traumatic bleeding. CT notes a large varicosity along the inferior-lateral quadrant of his stoma. For ligation of that vessel today.    Past Medical History:        Diagnosis Date    Anal cancer (HCC)     Anxiety and depression     Bipolar 1 disorder (HCC)     Colostomy in place (HCC)     COPD (chronic obstructive pulmonary disease) (HCC)     Diabetes mellitus (HCC)     Hepatitis B     HIV (human immunodeficiency virus infection) (HCC)     Hyperlipidemia     Hypertension     Nonalcoholic fatty liver disease     FROM HIV MEDS       Past Surgical History:        Procedure Laterality Date    COLONOSCOPY      COLOSTOMY      ILEOSCOPY N/A 11/17/2022    ILEOSCOPY DX ONLY performed by Rickey Hurt Jr., DO at [facility] ENDOSCOPY    PORT SURGERY      TOTAL HIP ARTHROPLASTY Left     UPPER GASTROINTESTINAL ENDOSCOPY N/A 11/17/2022    EGD BIOPSY performed by Rickey Hurt Jr., DO at [facility] ENDOSCOPY       Medications Prior to Admission:   Medications Prior to Admission: testosterone (ANDROGEL; TESTIM) 50 MG/5GM (1%) GEL 1% gel, Apply 0.05 g topically daily. Max Daily Amount: 0.05 g  insulin aspart (NOVOLOG) 100 UNIT/ML injection vial, Inject into the skin 3 times daily SLIDING SCALE  atorvastatin (LIPITOR) 40 MG tablet, Take 1 tablet by mouth daily  hydroCHLOROthiazide (HYDRODIURIL) 25 MG tablet, Take 1 tablet by mouth daily  lisinopril (PRINIVIL;ZESTRIL) 2.5 MG tablet, Take 1 tablet by mouth daily  potassium chloride (KLOR-CON M) 10 MEQ extended release tablet, Take 1 tablet by mouth daily  topiramate (TOPAMAX) 25 MG tablet, Take 2 tablets by mouth

## 2024-02-01 ENCOUNTER — TELEPHONE (OUTPATIENT)
Dept: SURGERY | Age: 54
End: 2024-02-01

## 2024-02-01 NOTE — TELEPHONE ENCOUNTER
Sent picture in [employer], the area he sent is gray and not black, the pink area was all gray yesterday but now pink, please advise.

## 2024-02-08 ENCOUNTER — PREP FOR PROCEDURE (OUTPATIENT)
Dept: SURGERY | Age: 54
End: 2024-02-08

## 2024-02-08 ENCOUNTER — OFFICE VISIT (OUTPATIENT)
Dept: SURGERY | Age: 54
End: 2024-02-08

## 2024-02-08 DIAGNOSIS — K94.01 BLEEDING FROM COLOSTOMY STOMA (HCC): Primary | ICD-10-CM

## 2024-02-08 PROCEDURE — 99024 POSTOP FOLLOW-UP VISIT: CPT | Performed by: SURGERY

## 2024-02-08 NOTE — PROGRESS NOTES
Austen Barahona (:  1970) is a 53 y.o. male,Established patient, here for evaluation of the following chief complaint(s):  Post-Op Check (Ligation of varicose vein of colostomy)         ASSESSMENT/PLAN:  1. Bleeding from colostomy stoma (HCC)    Follow up with me as needed           Subjective   SUBJECTIVE/OBJECTIVE:  HPI  Patient presents s/p ligation of varicosity at his colostomy. Patient is two weeks post op. Pain level is minor. Incision appearance: well healed. Post op complications: sloughing of the colostomy mucosa but it now appears healthy. Follow up with me as needed      Review of Systems       Objective   Physical Exam       Electronically signed by ENE SANTOS MD on 2024 at 6:35 PM        An electronic signature was used to authenticate this note.    --ENE SANTOS MD

## 2024-07-08 ENCOUNTER — TELEPHONE (OUTPATIENT)
Dept: SURGERY | Age: 54
End: 2024-07-08

## 2024-07-08 DIAGNOSIS — N28.89 KIDNEY MASS: Primary | ICD-10-CM

## 2024-07-08 NOTE — TELEPHONE ENCOUNTER
Left message on voicemail to notify that provider reviewed the message and a referral to [facility] was recommended. Referral has been submitted and office number left on patient's voicemail.

## 2024-07-20 ENCOUNTER — APPOINTMENT (OUTPATIENT)
Dept: CT IMAGING | Age: 54
End: 2024-07-20
Payer: MEDICARE

## 2024-07-20 ENCOUNTER — HOSPITAL ENCOUNTER (INPATIENT)
Age: 54
LOS: 2 days | Discharge: ANOTHER HOSPITAL | End: 2024-07-22
Attending: EMERGENCY MEDICINE | Admitting: HOSPITALIST
Payer: MEDICARE

## 2024-07-20 DIAGNOSIS — K94.01 BLEEDING FROM COLOSTOMY (HCC): Primary | ICD-10-CM

## 2024-07-20 PROBLEM — D62 ABLA (ACUTE BLOOD LOSS ANEMIA): Status: ACTIVE | Noted: 2024-07-20

## 2024-07-20 LAB
ABO + RH BLD: NORMAL
ALBUMIN SERPL-MCNC: 3.3 G/DL (ref 3.4–5)
ALBUMIN/GLOB SERPL: 1 {RATIO} (ref 1.1–2.2)
ALP SERPL-CCNC: 109 U/L (ref 40–129)
ALT SERPL-CCNC: 35 U/L (ref 10–40)
ANION GAP SERPL CALCULATED.3IONS-SCNC: 13 MMOL/L (ref 3–16)
AST SERPL-CCNC: 30 U/L (ref 15–37)
BASOPHILS # BLD: 0 K/UL (ref 0–0.2)
BASOPHILS NFR BLD: 0.4 %
BILIRUB SERPL-MCNC: 1.7 MG/DL (ref 0–1)
BLD GP AB SCN SERPL QL: NORMAL
BUN SERPL-MCNC: 15 MG/DL (ref 7–20)
CALCIUM SERPL-MCNC: 8.9 MG/DL (ref 8.3–10.6)
CHLORIDE SERPL-SCNC: 103 MMOL/L (ref 99–110)
CO2 SERPL-SCNC: 21 MMOL/L (ref 21–32)
CREAT SERPL-MCNC: 0.9 MG/DL (ref 0.9–1.3)
DEPRECATED RDW RBC AUTO: 20.3 % (ref 12.4–15.4)
EOSINOPHIL # BLD: 0.1 K/UL (ref 0–0.6)
EOSINOPHIL NFR BLD: 2.5 %
EST. AVERAGE GLUCOSE BLD GHB EST-MCNC: 174.3 MG/DL
GFR SERPLBLD CREATININE-BSD FMLA CKD-EPI: >90 ML/MIN/{1.73_M2}
GLUCOSE BLD-MCNC: 184 MG/DL (ref 70–99)
GLUCOSE BLD-MCNC: 189 MG/DL (ref 70–99)
GLUCOSE BLD-MCNC: 194 MG/DL (ref 70–99)
GLUCOSE BLD-MCNC: 266 MG/DL (ref 70–99)
GLUCOSE BLD-MCNC: 322 MG/DL (ref 70–99)
GLUCOSE SERPL-MCNC: 245 MG/DL (ref 70–99)
HBA1C MFR BLD: 7.7 %
HCT VFR BLD AUTO: 33.6 % (ref 40.5–52.5)
HCT VFR BLD AUTO: 34.5 % (ref 40.5–52.5)
HCT VFR BLD AUTO: 35.4 % (ref 40.5–52.5)
HCT VFR BLD AUTO: 37.1 % (ref 40.5–52.5)
HGB BLD-MCNC: 11.4 G/DL (ref 13.5–17.5)
HGB BLD-MCNC: 11.6 G/DL (ref 13.5–17.5)
HGB BLD-MCNC: 11.8 G/DL (ref 13.5–17.5)
HGB BLD-MCNC: 12.2 G/DL (ref 13.5–17.5)
INR PPP: 1.12 (ref 0.85–1.15)
LACTATE BLDV-SCNC: 1.9 MMOL/L (ref 0.4–2)
LYMPHOCYTES # BLD: 1 K/UL (ref 1–5.1)
LYMPHOCYTES NFR BLD: 23.9 %
MCH RBC QN AUTO: 27.1 PG (ref 26–34)
MCHC RBC AUTO-ENTMCNC: 32.9 G/DL (ref 31–36)
MCV RBC AUTO: 82.4 FL (ref 80–100)
MONOCYTES # BLD: 0.4 K/UL (ref 0–1.3)
MONOCYTES NFR BLD: 9.2 %
NEUTROPHILS # BLD: 2.8 K/UL (ref 1.7–7.7)
NEUTROPHILS NFR BLD: 64 %
PERFORMED ON: ABNORMAL
PLATELET # BLD AUTO: 58 K/UL (ref 135–450)
PMV BLD AUTO: 9 FL (ref 5–10.5)
POTASSIUM SERPL-SCNC: 4.1 MMOL/L (ref 3.5–5.1)
PROT SERPL-MCNC: 6.7 G/DL (ref 6.4–8.2)
PROTHROMBIN TIME: 14.6 SEC (ref 11.9–14.9)
RBC # BLD AUTO: 4.5 M/UL (ref 4.2–5.9)
SODIUM SERPL-SCNC: 137 MMOL/L (ref 136–145)
WBC # BLD AUTO: 4.3 K/UL (ref 4–11)

## 2024-07-20 PROCEDURE — 85014 HEMATOCRIT: CPT

## 2024-07-20 PROCEDURE — 83036 HEMOGLOBIN GLYCOSYLATED A1C: CPT

## 2024-07-20 PROCEDURE — 85025 COMPLETE CBC W/AUTO DIFF WBC: CPT

## 2024-07-20 PROCEDURE — 6360000002 HC RX W HCPCS: Performed by: HOSPITALIST

## 2024-07-20 PROCEDURE — 6370000000 HC RX 637 (ALT 250 FOR IP)

## 2024-07-20 PROCEDURE — 6360000004 HC RX CONTRAST MEDICATION: Performed by: SURGERY

## 2024-07-20 PROCEDURE — 94640 AIRWAY INHALATION TREATMENT: CPT

## 2024-07-20 PROCEDURE — 6370000000 HC RX 637 (ALT 250 FOR IP): Performed by: HOSPITALIST

## 2024-07-20 PROCEDURE — 86901 BLOOD TYPING SEROLOGIC RH(D): CPT

## 2024-07-20 PROCEDURE — 99285 EMERGENCY DEPT VISIT HI MDM: CPT

## 2024-07-20 PROCEDURE — 86850 RBC ANTIBODY SCREEN: CPT

## 2024-07-20 PROCEDURE — 74177 CT ABD & PELVIS W/CONTRAST: CPT

## 2024-07-20 PROCEDURE — 83605 ASSAY OF LACTIC ACID: CPT

## 2024-07-20 PROCEDURE — 85018 HEMOGLOBIN: CPT

## 2024-07-20 PROCEDURE — 86900 BLOOD TYPING SEROLOGIC ABO: CPT

## 2024-07-20 PROCEDURE — 94760 N-INVAS EAR/PLS OXIMETRY 1: CPT

## 2024-07-20 PROCEDURE — 85610 PROTHROMBIN TIME: CPT

## 2024-07-20 PROCEDURE — 80053 COMPREHEN METABOLIC PANEL: CPT

## 2024-07-20 PROCEDURE — 1200000000 HC SEMI PRIVATE

## 2024-07-20 PROCEDURE — 36415 COLL VENOUS BLD VENIPUNCTURE: CPT

## 2024-07-20 PROCEDURE — 2580000003 HC RX 258: Performed by: HOSPITALIST

## 2024-07-20 PROCEDURE — 99222 1ST HOSP IP/OBS MODERATE 55: CPT | Performed by: SURGERY

## 2024-07-20 PROCEDURE — 2580000003 HC RX 258: Performed by: EMERGENCY MEDICINE

## 2024-07-20 RX ORDER — TOPIRAMATE 100 MG/1
100 TABLET, FILM COATED ORAL 3 TIMES DAILY
Status: DISCONTINUED | OUTPATIENT
Start: 2024-07-20 | End: 2024-07-22 | Stop reason: HOSPADM

## 2024-07-20 RX ORDER — ACETAMINOPHEN 650 MG/1
650 SUPPOSITORY RECTAL EVERY 6 HOURS PRN
Status: DISCONTINUED | OUTPATIENT
Start: 2024-07-20 | End: 2024-07-22 | Stop reason: HOSPADM

## 2024-07-20 RX ORDER — INSULIN LISPRO 100 [IU]/ML
0-4 INJECTION, SOLUTION INTRAVENOUS; SUBCUTANEOUS EVERY 4 HOURS
Status: DISCONTINUED | OUTPATIENT
Start: 2024-07-20 | End: 2024-07-21

## 2024-07-20 RX ORDER — ONDANSETRON 2 MG/ML
4 INJECTION INTRAMUSCULAR; INTRAVENOUS EVERY 6 HOURS PRN
Status: DISCONTINUED | OUTPATIENT
Start: 2024-07-20 | End: 2024-07-22 | Stop reason: HOSPADM

## 2024-07-20 RX ORDER — ONDANSETRON 4 MG/1
4 TABLET, ORALLY DISINTEGRATING ORAL EVERY 8 HOURS PRN
Status: DISCONTINUED | OUTPATIENT
Start: 2024-07-20 | End: 2024-07-22 | Stop reason: HOSPADM

## 2024-07-20 RX ORDER — 0.9 % SODIUM CHLORIDE 0.9 %
1000 INTRAVENOUS SOLUTION INTRAVENOUS ONCE
Status: COMPLETED | OUTPATIENT
Start: 2024-07-20 | End: 2024-07-20

## 2024-07-20 RX ORDER — SODIUM CHLORIDE 9 MG/ML
INJECTION, SOLUTION INTRAVENOUS PRN
Status: DISCONTINUED | OUTPATIENT
Start: 2024-07-20 | End: 2024-07-22 | Stop reason: HOSPADM

## 2024-07-20 RX ORDER — DEXTROSE MONOHYDRATE 100 MG/ML
INJECTION, SOLUTION INTRAVENOUS CONTINUOUS PRN
Status: DISCONTINUED | OUTPATIENT
Start: 2024-07-20 | End: 2024-07-22 | Stop reason: HOSPADM

## 2024-07-20 RX ORDER — SODIUM CHLORIDE 0.9 % (FLUSH) 0.9 %
5-40 SYRINGE (ML) INJECTION EVERY 12 HOURS SCHEDULED
Status: DISCONTINUED | OUTPATIENT
Start: 2024-07-20 | End: 2024-07-22 | Stop reason: HOSPADM

## 2024-07-20 RX ORDER — ALBUTEROL SULFATE 90 UG/1
2 AEROSOL, METERED RESPIRATORY (INHALATION) EVERY 6 HOURS PRN
Status: DISCONTINUED | OUTPATIENT
Start: 2024-07-20 | End: 2024-07-22 | Stop reason: HOSPADM

## 2024-07-20 RX ORDER — SODIUM CHLORIDE 0.9 % (FLUSH) 0.9 %
5-40 SYRINGE (ML) INJECTION PRN
Status: DISCONTINUED | OUTPATIENT
Start: 2024-07-20 | End: 2024-07-22 | Stop reason: HOSPADM

## 2024-07-20 RX ORDER — ACETAMINOPHEN 325 MG/1
650 TABLET ORAL EVERY 6 HOURS PRN
Status: DISCONTINUED | OUTPATIENT
Start: 2024-07-20 | End: 2024-07-22 | Stop reason: HOSPADM

## 2024-07-20 RX ORDER — EMTRICITABINE 200 MG/1
200 CAPSULE ORAL DAILY
Status: DISCONTINUED | OUTPATIENT
Start: 2024-07-20 | End: 2024-07-22 | Stop reason: HOSPADM

## 2024-07-20 RX ORDER — NICOTINE 21 MG/24HR
1 PATCH, TRANSDERMAL 24 HOURS TRANSDERMAL DAILY
Status: DISCONTINUED | OUTPATIENT
Start: 2024-07-20 | End: 2024-07-22 | Stop reason: HOSPADM

## 2024-07-20 RX ORDER — GLUCAGON 1 MG/ML
1 KIT INJECTION PRN
Status: DISCONTINUED | OUTPATIENT
Start: 2024-07-20 | End: 2024-07-22 | Stop reason: HOSPADM

## 2024-07-20 RX ORDER — CLONAZEPAM 1 MG/1
1 TABLET ORAL NIGHTLY PRN
Status: DISCONTINUED | OUTPATIENT
Start: 2024-07-20 | End: 2024-07-22 | Stop reason: HOSPADM

## 2024-07-20 RX ADMIN — QUETIAPINE FUMARATE 400 MG: 150 TABLET, EXTENDED RELEASE ORAL at 13:29

## 2024-07-20 RX ADMIN — OCTREOTIDE ACETATE 25 MCG/HR: 500 INJECTION, SOLUTION INTRAVENOUS; SUBCUTANEOUS at 10:08

## 2024-07-20 RX ADMIN — CLONAZEPAM 1 MG: 1 TABLET ORAL at 20:17

## 2024-07-20 RX ADMIN — TOPIRAMATE 100 MG: 100 TABLET, FILM COATED ORAL at 13:29

## 2024-07-20 RX ADMIN — TOPIRAMATE 100 MG: 100 TABLET, FILM COATED ORAL at 20:06

## 2024-07-20 RX ADMIN — IOPAMIDOL 75 ML: 755 INJECTION, SOLUTION INTRAVENOUS at 21:34

## 2024-07-20 RX ADMIN — QUETIAPINE FUMARATE 400 MG: 150 TABLET, EXTENDED RELEASE ORAL at 20:07

## 2024-07-20 RX ADMIN — SODIUM CHLORIDE 1000 ML: 9 INJECTION, SOLUTION INTRAVENOUS at 01:36

## 2024-07-20 RX ADMIN — INSULIN LISPRO 2 UNITS: 100 INJECTION, SOLUTION INTRAVENOUS; SUBCUTANEOUS at 20:06

## 2024-07-20 RX ADMIN — RALTEGRAVIR 400 MG: 400 TABLET, FILM COATED ORAL at 13:29

## 2024-07-20 RX ADMIN — EMTRICITABINE 200 MG: 200 CAPSULE ORAL at 13:29

## 2024-07-20 RX ADMIN — TENOFOVIR ALAFENAMIDE 25 MG: 25 TABLET ORAL at 13:29

## 2024-07-20 RX ADMIN — MOMETASONE FUROATE AND FORMOTEROL FUMARATE DIHYDRATE 2 PUFF: 100; 5 AEROSOL RESPIRATORY (INHALATION) at 19:10

## 2024-07-20 RX ADMIN — CEFTRIAXONE SODIUM 2000 MG: 2 INJECTION, POWDER, FOR SOLUTION INTRAMUSCULAR; INTRAVENOUS at 06:29

## 2024-07-20 RX ADMIN — INSULIN LISPRO 3 UNITS: 100 INJECTION, SOLUTION INTRAVENOUS; SUBCUTANEOUS at 16:41

## 2024-07-20 RX ADMIN — MOMETASONE FUROATE AND FORMOTEROL FUMARATE DIHYDRATE 2 PUFF: 100; 5 AEROSOL RESPIRATORY (INHALATION) at 14:22

## 2024-07-20 RX ADMIN — RALTEGRAVIR 400 MG: 400 TABLET, FILM COATED ORAL at 20:06

## 2024-07-20 ASSESSMENT — PAIN SCALES - GENERAL: PAINLEVEL_OUTOF10: 0

## 2024-07-20 ASSESSMENT — LIFESTYLE VARIABLES: HOW OFTEN DO YOU HAVE A DRINK CONTAINING ALCOHOL: NEVER

## 2024-07-20 NOTE — PROGRESS NOTES
Pharmacy Medication Reconciliation Note     List of medications patient is currently taking is complete.    Source of information:   1. Discussion with patient at bedside  2. Epic records (dispense report)    Notes regarding home medications:   1. Medication list up to date-removed Trulicity and added Ozempic.  2. Message sent to hospitalist about restarting home medications    Kera Granda PharmD, BCPS  7/20/2024 12:11 PM

## 2024-07-20 NOTE — PROGRESS NOTES
4 Eyes Skin Assessment     NAME:  Austen Barahona  YOB: 1970  MEDICAL RECORD NUMBER:  [id number]    The patient is being assessed for  Admission    I agree that at least one RN has performed a thorough Head to Toe Skin Assessment on the patient. ALL assessment sites listed below have been assessed.      Areas assessed by both nurses:    Head, Face, Ears, Shoulders, Back, Chest, Arms, Elbows, Hands, Sacrum. Buttock, Coccyx, Ischium, Legs. Feet and Heels, and Under Medical Devices         Does the Patient have a Wound? No noted wound(s)       Matthew Prevention initiated by RN: No  Wound Care Orders initiated by RN: No    Pressure Injury (Stage 3,4, Unstageable, DTI, NWPT, and Complex wounds) if present, place Wound referral order by RN under : No    New Ostomies, if present place, Ostomy referral order under : Yes     Nurse 1 eSignature: Electronically signed by Christy Carlson RN on 7/20/24 at 6:41 AM EDT        Nurse 2 eSignature: Electronically signed by Sheela Scott RN on 7/20/24 at 6:42 AM EDT

## 2024-07-20 NOTE — ED PROVIDER NOTES
[facility] EMERGENCY DEPARTMENT    CHIEF COMPLAINT  GI Problem (Noticed bleeding out of colostomy around 630pm this evening. Has changed ostomy bag twice already. Pt states that when he changes the bag, it has dark red output.)       HISTORY OF PRESENT ILLNESS  Austen Barahona is a 54 y.o. male with history of MAGALLON (due to HIV medications), HIV on ART, anal cancer with colostomy complicated by varicosity at his colostomy requiring ligation from hemorrhage in December who presents to the ED with bleeding from his colostomy site.  Noticed that this started around 6:30 PM this evening.  He has had to change his ostomy bag twice already.  States that when he changes the bag he has dark red output around inferior aspect of his colostomy site that is not pulsatile.  Bleeding seems to slow down when he has his colostomy bag in place and is more brisk when it is off.  He had a significant hemorrhage requiring blood transfusion previously so he presented earlier this time to make sure he was not losing too much blood.  Denies any abdominal pain, vomiting, fever, urinary difficulty.  Denies any trauma to the area.    I have reviewed the following from the nursing documentation:    Past Medical History:   Diagnosis Date    Anal cancer (HCC)     Anxiety and depression     Bipolar 1 disorder (HCC)     Colostomy in place (HCC)     COPD (chronic obstructive pulmonary disease) (HCC)     Diabetes mellitus (HCC)     Hepatitis B     HIV (human immunodeficiency virus infection) (HCC)     Hyperlipidemia     Hypertension     Nonalcoholic fatty liver disease     FROM HIV MEDS     Past Surgical History:   Procedure Laterality Date    COLONOSCOPY      COLOSTOMY      ILEOSCOPY N/A 11/17/2022    ILEOSCOPY DX ONLY performed by Rickey Hurt Jr., DO at [facility] ENDOSCOPY    PORT SURGERY      SMALL INTESTINE SURGERY N/A 1/24/2024    LIGATION OF VARICOSE VEIN OF COLOSTOMY performed by Dayne Bhakta MD at [facility] OR

## 2024-07-20 NOTE — FLOWSHEET NOTE
Patient admitted from ED via stretcher. A&O X4. Denies pain and discomfort. Oriented to room and surroundings. Rocephin initiated and infusing at this time to left hand. Colostomy to left lower quad.  Skin assessment completed and orders for wound to see placed. Admission questions still need to be completed.

## 2024-07-20 NOTE — ED NOTES
RN responded to call light; pt states that he pressed call light to tell nurses that he did not need assistance with the urinal; RN adjusted pt's bed height elevation, per pt request.

## 2024-07-20 NOTE — PLAN OF CARE
Problem: Discharge Planning  Goal: Discharge to home or other facility with appropriate resources  7/20/2024 1051 by Haydee Reeves, RN  Outcome: Progressing  7/20/2024 0649 by Christy Carlson, RN  Outcome: Progressing     Problem: Pain  Goal: Verbalizes/displays adequate comfort level or baseline comfort level  7/20/2024 1051 by Haydee Reeves, RN  Outcome: Progressing  7/20/2024 0649 by Christy Carlson, RN  Outcome: Progressing

## 2024-07-20 NOTE — PROGRESS NOTES
Same-day progress note, patient admitted after midnight, early this a.m.  H&P reviewed and patient seen at bedside.  He is a 54-year-old male with a past medical history of anorectal cancer s/p colectomy and ostomy, HIV, cirrhosis, type 2 diabetes and prior peristomal variceal bleeding who presented to the ED with bright red blood per stoma.  Per chart review, patient was admitted in December 2023 for septic shock secondary acute blood loss anemia through stoma, felt it was secondary to varices, was treated conservatively with sutures and epinephrine injection.  He was discharged with follow-up with Dr. Bhakta and had litigation of his varicose vein of his colostomy on 1/24/2024.  Patient reported he been doing well since then.  While in the ED, hemoglobin is noted to be mildly low at 12.2.,  He was mildly tachycardic and BP was stable.  General surgery was consulted and recommended admission overnight to monitor H&H.    Patient seen this morning, states he was still having some bright red bleeding this morning, ostomy bag with black stool.  Denies any chest pain or shortness of breath, denies any recent medication changes, no NSAIDs.  Denies any dizziness or lightheadedness.  Discussed awaiting next H&H check and general surgery recommendations.    Acute blood loss anemia  History of variceal bleeding  -H&H on admission 12.2, significantly improved from recent baseline 7-8 at discharge on 12/23.    -H&H this a.m. 11.4.  No gross bleeding.  Tachycardia resolved, BP stable.  -Started on octreotide and ceftriaxone due to prior variceal bleeding  -General surgery consulted, appreciate recommendations  -Continue to monitor H&H every 6 hours, transfuse keep hemoglobin greater than 7.

## 2024-07-20 NOTE — H&P
V2.0  History and Physical      Name:  Austen Barahona /Age/Sex: 1970  (54 y.o. male)   MRN & CSN:  [id number] & [id number] Encounter Date/Time: 2024 4:01 AM EDT   Location:   PCP: Marquise Hardwick MD       Hospital Day: 1    Assessment and Plan:   Austen Barahona is a 54 y.o. male with a pmh of anorectal cancer s/p surgery and chemoradiation with a colectomy and ostomy bag; diabetes mellitus; HIV; cirrhosis from MAGALLON secondary to HIV medications who presents to the emergency department with bright red blood per stoma consistent  with ABLA (acute blood loss anemia)        Plan:  ED physician discussed with general surgery who recommended the patient be admitted and monitored.  Will trend H&H.  Patient kept NPO.  Blood glucose is mildly elevated.  Patient is at risk for hypoglycemia no basal insulin at this time.  Accu-Cheks question scale insulin ordered.  Started on ceftriaxone and octreotide.  General surgery consult.  While n.p.o. we will hold off IV fluid secondary to ascites.  Nicotine patch ordered.  Counseled.  HIV-stable.  HIV medications continued.  Advanced care planning was discussed with patient for a total of [16] minutes.  Full code, DNR CC, DNR CCA, power of  and living will were discussed.  Patient elected to be a full code.  Disposition:   Current Living situation: Home  Expected Disposition: Home  Estimated D/C: 3 days    Diet N.p.o.   DVT Prophylaxis [] Lovenox, []  Heparin, [x] SCDs, [] Ambulation,  [] Eliquis, [] Xarelto, [] Coumadin   Code Status Full code   Surrogate Decision Maker/ POA Isabel Adorno, significant other     Personally reviewed Lab Studies and Imaging     Discussed management of the case with  ED provider who recommended admission    History from:     patient    History of Present Illness:     Chief Complaint: Bright red blood into stoma.  Austen Barahona is a 54 y.o. male with pmh of anorectal cancer s/p surgery and chemoradiation with a colectomy and  Successful cardioversion. Continue amiodarone and metoprolol. Resume warfarin with heparin bridge until INR is therapeutic. for: \"TROPONINT\"  Lactic Acid:   Recent Labs     07/20/24  0124   LACTA 1.9     BNP: No results for input(s): \"PROBNP\" in the last 72 hours.  UA:No results found for: \"NITRU\", \"COLORU\", \"PHUR\", \"LABCAST\", \"WBCUA\", \"RBCUA\", \"MUCUS\", \"TRICHOMONAS\", \"YEAST\", \"BACTERIA\", \"CLARITYU\", \"SPECGRAV\", \"LEUKOCYTESUR\", \"UROBILINOGEN\", \"BILIRUBINUR\", \"BLOODU\", \"GLUCOSEU\", \"KETUA\", \"AMORPHOUS\"  Urine Cultures: No results found for: \"LABURIN\"  Blood Cultures: No results found for: \"BC\"  No results found for: \"BLOODCULT2\"  Organism: No results found for: \"ORG\"    Imaging/Diagnostics Last 24 Hours   No results found.      Electronically signed by Arnulfo Blum MD on 7/20/2024 at 4:01 AM

## 2024-07-20 NOTE — CONSULTS
General Surgery Consult Note      Austen Barahona   : 1970 MRN: [id number]  Date of Admission: 2024  Admitting Physician:Arnulfo Blum MD  Primary Care Physician: Marquise Hardwick MD    Chief Complaint   Patient presents with    GI Problem     Noticed bleeding out of colostomy around 630pm this evening. Has changed ostomy bag twice already. Pt states that when he changes the bag, it has dark red output.       Reason for Consult: bleeding ostomy, hx of cirrhosis with variceal bleeding    Diagnosis Present on Admission:   ABLA (acute blood loss anemia)      History of Present Illness  Austen Barahona is a 54 y.o. male with history of cirrhosis secondary to HIV medication and APR in  for anal cancer who had a ligation of parastomal varix in 2024 who presented yesterday with active bleeding from his ostomy.  He noted that the bleeding was at the inferior aspect, and he filled two ostomy appliances with blood.  He did take a hot shower which slowed the bleeding.  He is still having some bloody output into his colostomy bag.  He denies any issues with eating, drinking, or change in colostomy output.    Past Medical History:   Diagnosis Date    Anal cancer (HCC)     Anxiety and depression     Bipolar 1 disorder (HCC)     Colostomy in place (HCC)     COPD (chronic obstructive pulmonary disease) (HCC)     Diabetes mellitus (HCC)     Hepatitis B     HIV (human immunodeficiency virus infection) (HCC)     Hyperlipidemia     Hypertension     Nonalcoholic fatty liver disease     FROM HIV MEDS     Past Surgical History:   Procedure Laterality Date    COLONOSCOPY      COLOSTOMY      ILEOSCOPY N/A 2022    ILEOSCOPY DX ONLY performed by Rickey Hurt Jr., DO at [facility] ENDOSCOPY    PORT SURGERY      SMALL INTESTINE SURGERY N/A 2024    LIGATION OF VARICOSE VEIN OF COLOSTOMY performed by Dayne Bhakta MD at [facility] OR    TOTAL HIP ARTHROPLASTY Left     UPPER GASTROINTESTINAL ENDOSCOPY  hypertension  As above  3. Hx of HIV, Hep B      Harmeet Martinez MD

## 2024-07-21 LAB
APTT BLD: 30.7 SEC (ref 22.1–36.4)
GLUCOSE BLD-MCNC: 174 MG/DL (ref 70–99)
GLUCOSE BLD-MCNC: 183 MG/DL (ref 70–99)
GLUCOSE BLD-MCNC: 191 MG/DL (ref 70–99)
GLUCOSE BLD-MCNC: 197 MG/DL (ref 70–99)
GLUCOSE BLD-MCNC: 197 MG/DL (ref 70–99)
GLUCOSE BLD-MCNC: 199 MG/DL (ref 70–99)
GLUCOSE BLD-MCNC: 216 MG/DL (ref 70–99)
GLUCOSE BLD-MCNC: 237 MG/DL (ref 70–99)
HCT VFR BLD AUTO: 31.8 % (ref 40.5–52.5)
HCT VFR BLD AUTO: 32.3 % (ref 40.5–52.5)
HCT VFR BLD AUTO: 32.7 % (ref 40.5–52.5)
HCT VFR BLD AUTO: 32.8 % (ref 40.5–52.5)
HGB BLD-MCNC: 10.5 G/DL (ref 13.5–17.5)
HGB BLD-MCNC: 10.7 G/DL (ref 13.5–17.5)
HGB BLD-MCNC: 10.9 G/DL (ref 13.5–17.5)
HGB BLD-MCNC: 11 G/DL (ref 13.5–17.5)
INR PPP: 1.13 (ref 0.85–1.15)
IRON SATN MFR SERPL: 23 % (ref 20–50)
IRON SERPL-MCNC: 76 UG/DL (ref 59–158)
PERFORMED ON: ABNORMAL
PROTHROMBIN TIME: 14.7 SEC (ref 11.9–14.9)
TIBC SERPL-MCNC: 329 UG/DL (ref 260–445)

## 2024-07-21 PROCEDURE — 83550 IRON BINDING TEST: CPT

## 2024-07-21 PROCEDURE — 94760 N-INVAS EAR/PLS OXIMETRY 1: CPT

## 2024-07-21 PROCEDURE — 99232 SBSQ HOSP IP/OBS MODERATE 35: CPT | Performed by: SURGERY

## 2024-07-21 PROCEDURE — 83540 ASSAY OF IRON: CPT

## 2024-07-21 PROCEDURE — 2580000003 HC RX 258: Performed by: HOSPITALIST

## 2024-07-21 PROCEDURE — 1200000000 HC SEMI PRIVATE

## 2024-07-21 PROCEDURE — 94640 AIRWAY INHALATION TREATMENT: CPT

## 2024-07-21 PROCEDURE — 6370000000 HC RX 637 (ALT 250 FOR IP): Performed by: HOSPITALIST

## 2024-07-21 PROCEDURE — 6370000000 HC RX 637 (ALT 250 FOR IP)

## 2024-07-21 PROCEDURE — 85014 HEMATOCRIT: CPT

## 2024-07-21 PROCEDURE — 36415 COLL VENOUS BLD VENIPUNCTURE: CPT

## 2024-07-21 PROCEDURE — 85610 PROTHROMBIN TIME: CPT

## 2024-07-21 PROCEDURE — 85730 THROMBOPLASTIN TIME PARTIAL: CPT

## 2024-07-21 PROCEDURE — 85018 HEMOGLOBIN: CPT

## 2024-07-21 PROCEDURE — 6360000002 HC RX W HCPCS: Performed by: HOSPITALIST

## 2024-07-21 RX ORDER — DEXTROSE MONOHYDRATE 100 MG/ML
INJECTION, SOLUTION INTRAVENOUS CONTINUOUS PRN
Status: DISCONTINUED | OUTPATIENT
Start: 2024-07-21 | End: 2024-07-21 | Stop reason: SDUPTHER

## 2024-07-21 RX ORDER — INSULIN LISPRO 100 [IU]/ML
0-8 INJECTION, SOLUTION INTRAVENOUS; SUBCUTANEOUS
Status: DISCONTINUED | OUTPATIENT
Start: 2024-07-21 | End: 2024-07-22 | Stop reason: HOSPADM

## 2024-07-21 RX ORDER — INSULIN LISPRO 100 [IU]/ML
0-4 INJECTION, SOLUTION INTRAVENOUS; SUBCUTANEOUS NIGHTLY
Status: DISCONTINUED | OUTPATIENT
Start: 2024-07-21 | End: 2024-07-22 | Stop reason: HOSPADM

## 2024-07-21 RX ORDER — GLUCAGON 1 MG/ML
1 KIT INJECTION PRN
Status: DISCONTINUED | OUTPATIENT
Start: 2024-07-21 | End: 2024-07-21 | Stop reason: SDUPTHER

## 2024-07-21 RX ADMIN — TENOFOVIR ALAFENAMIDE 25 MG: 25 TABLET ORAL at 09:08

## 2024-07-21 RX ADMIN — MOMETASONE FUROATE AND FORMOTEROL FUMARATE DIHYDRATE 2 PUFF: 100; 5 AEROSOL RESPIRATORY (INHALATION) at 08:13

## 2024-07-21 RX ADMIN — INSULIN LISPRO 2 UNITS: 100 INJECTION, SOLUTION INTRAVENOUS; SUBCUTANEOUS at 17:30

## 2024-07-21 RX ADMIN — OCTREOTIDE ACETATE 25 MCG/HR: 500 INJECTION, SOLUTION INTRAVENOUS; SUBCUTANEOUS at 10:06

## 2024-07-21 RX ADMIN — CEFTRIAXONE SODIUM 2000 MG: 2 INJECTION, POWDER, FOR SOLUTION INTRAMUSCULAR; INTRAVENOUS at 09:28

## 2024-07-21 RX ADMIN — INSULIN LISPRO 2 UNITS: 100 INJECTION, SOLUTION INTRAVENOUS; SUBCUTANEOUS at 12:49

## 2024-07-21 RX ADMIN — QUETIAPINE FUMARATE 400 MG: 150 TABLET, EXTENDED RELEASE ORAL at 21:46

## 2024-07-21 RX ADMIN — QUETIAPINE FUMARATE 400 MG: 150 TABLET, EXTENDED RELEASE ORAL at 09:08

## 2024-07-21 RX ADMIN — EMTRICITABINE 200 MG: 200 CAPSULE ORAL at 09:09

## 2024-07-21 RX ADMIN — RALTEGRAVIR 400 MG: 400 TABLET, FILM COATED ORAL at 21:46

## 2024-07-21 RX ADMIN — TOPIRAMATE 100 MG: 100 TABLET, FILM COATED ORAL at 09:08

## 2024-07-21 RX ADMIN — TOPIRAMATE 100 MG: 100 TABLET, FILM COATED ORAL at 21:46

## 2024-07-21 RX ADMIN — RALTEGRAVIR 400 MG: 400 TABLET, FILM COATED ORAL at 09:08

## 2024-07-21 RX ADMIN — TOPIRAMATE 100 MG: 100 TABLET, FILM COATED ORAL at 14:43

## 2024-07-21 NOTE — PROGRESS NOTES
Hospitalist Progress Note      PCP: Marquise Hardwick MD    Date of Admission: 7/20/2024    Chief Complaint: Bleeding from ostomy    Hospital Course: 54-year-old male with a past medical history of anorectal cancer s/p colectomy and ostomy, HIV, cirrhosis, type 2 diabetes and prior peristomal variceal bleeding who presented to the ED with bright red blood per stoma.  Per chart review, patient was admitted in December 2023 for septic shock secondary acute blood loss anemia through stoma, felt it was secondary to varices, was treated conservatively with sutures and epinephrine injection.  He was discharged with follow-up with Dr. Bhakta and had litigation of his varicose vein of his colostomy on 1/24/2024.  Patient reported he been doing well since then.  While in the ED, hemoglobin is noted to be mildly low at 12.2.,  He was mildly tachycardic and BP was stable.  General surgery was consulted and recommended admission overnight to monitor H&H.     H&H continue to mildly drop.  Still with bleeding in ostomy, CT A/P completed 7/20 showed cirrhotic morphology of the liver with sequelae of portal hypertension and extensive varices of the abdomen, multiple varices seen within the fat adjacent to the colostomy.  Discussed with Dr. Martinez 7/21, no current role for surgical intervention.  He will discuss with Dr. Bhakta.     Update 1200: Discussed with Dr. Martinez, he recommends patient be transferred to  for a TIPS procedure.  Called and spoke to transfer center, patient was accepted by hospitalist team.  Currently pending transfer when bed is available.    Subjective: Patient seen lying in bed, states he still having bleeding from his ostomy, has been changed multiple times due to increased output.  Last changed overnight, with some dark red/maroon output this morning.  Patient denies abdominal pain, no nausea or vomiting, no chest pain.  Patient denies any dysuria or urinary

## 2024-07-21 NOTE — PLAN OF CARE
Problem: Discharge Planning  Goal: Discharge to home or other facility with appropriate resources  7/20/2024 2157 by Christy Flores, RN  Outcome: Progressing  7/20/2024 1051 by Haydee Reeves RN  Outcome: Progressing     Problem: Pain  Goal: Verbalizes/displays adequate comfort level or baseline comfort level  7/20/2024 2157 by Christy Flores RN  Outcome: Progressing  7/20/2024 1051 by Haydee Reeves RN  Outcome: Progressing     Problem: Safety - Adult  Goal: Free from fall injury  Outcome: Progressing     Problem: Gastrointestinal - Adult  Goal: Minimal or absence of nausea and vomiting  Outcome: Progressing  Goal: Maintains or returns to baseline bowel function  Outcome: Progressing  Goal: Maintains adequate nutritional intake  Outcome: Progressing  Goal: Establish and maintain optimal ostomy function  Outcome: Progressing

## 2024-07-21 NOTE — DISCHARGE SUMMARY
Hospital Medicine Discharge Summary    Patient ID: Austen Barahona      Patient's PCP: Marquise Hardwick MD    Admit Date: 7/20/2024     Discharge Date:   7/21/24, transfer initiated to  pending bed availability.    Admitting Physician: Arnulfo Blum MD     Discharge Physician: Parul David PA-C       Hospital Course: 54-year-old male with a past medical history of anorectal cancer s/p colectomy and ostomy, HIV, cirrhosis, type 2 diabetes and prior peristomal variceal bleeding who presented to the ED with bright red blood per stoma.  Per chart review, patient was admitted in December 2023 for septic shock secondary acute blood loss anemia through stoma, felt it was secondary to varices, was treated conservatively with sutures and epinephrine injection.  He was discharged with follow-up with Dr. Bhakta and had litigation of his varicose vein of his colostomy on 1/24/2024.  Patient reported he been doing well since then.  While in the ED, hemoglobin is noted to be mildly low at 12.2.,  He was mildly tachycardic and BP was stable.  General surgery was consulted and recommended admission overnight to monitor H&H.      H&H continue to mildly drop.  Still with bleeding in ostomy, CT A/P completed 7/20 showed cirrhotic morphology of the liver with sequelae of portal hypertension and extensive varices of the abdomen, multiple varices seen within the fat adjacent to the colostomy.  Discussed with Dr. Martinez 7/21, no current role for surgical intervention.  He recommends patient be transferred to  for a TIPS procedure.  Called and spoke to transfer center, patient was accepted by hospitalist team Dr Jordan .  Currently pending transfer when bed is available.  Discussed with patient at bedside who is agreeable for transfer.      Acute blood loss anemia  History of variceal bleeding  -H&H on admission 12.2, significantly improved from recent baseline 7-8 at discharge on 12/23.    -H&H 12.2-->10.7 still    QUEtiapine (SEROQUEL XR) 400 MG extended release tablet Take 1 tablet by mouth in the morning and at bedtime      raltegravir (ISENTRESS) 400 MG tablet Take 1 tablet by mouth 2 times daily      clonazePAM (KLONOPIN) 1 MG tablet Take 1 tablet by mouth nightly as needed for Anxiety.      tadalafil (CIALIS) 5 MG tablet Take 1 tablet by mouth as needed for Erectile Dysfunction             Time Spent on discharge is more than 40 min in the examination, evaluation, counseling and review of medications and discharge plan.      Signed:    Parul David PA-C   7/21/2024    The patient was seen and examined on day of discharge and this discharge summary is in conjunction with any daily progress note from day of discharge.    Thank you Marquise Hardwick MD for the opportunity to be involved in this patient's care. If you have any questions or concerns please feel free to contact me at [phone].    NOTE:  This report was transcribed using voice recognition software.  Every effort was made to ensure accuracy; however, inadvertent computerized transcription errors may be present.

## 2024-07-21 NOTE — PLAN OF CARE
Problem: Discharge Planning  Goal: Discharge to home or other facility with appropriate resources  7/21/2024 1113 by Haydee Reeves RN  Outcome: Progressing  7/20/2024 2157 by Christy Flores RN  Outcome: Progressing     Problem: Pain  Goal: Verbalizes/displays adequate comfort level or baseline comfort level  7/21/2024 1113 by Haydee Reeves RN  Outcome: Progressing  7/20/2024 2157 by Christy Flores RN  Outcome: Progressing     Problem: Safety - Adult  Goal: Free from fall injury  7/21/2024 1113 by Haydee Reeves RN  Outcome: Progressing  7/20/2024 2157 by Christy Flores RN  Outcome: Progressing     Problem: Gastrointestinal - Adult  Goal: Minimal or absence of nausea and vomiting  7/21/2024 1113 by Haydee Reeves RN  Outcome: Progressing  7/20/2024 2157 by Christy Flores RN  Outcome: Progressing  Goal: Maintains or returns to baseline bowel function  7/21/2024 1113 by Haydee Reeves RN  Outcome: Progressing  7/20/2024 2157 by Christy Flores, RN  Outcome: Progressing  Goal: Maintains adequate nutritional intake  7/21/2024 1113 by Haydee Reeves RN  Outcome: Progressing  7/20/2024 2157 by Christy Flores, RN  Outcome: Progressing  Goal: Establish and maintain optimal ostomy function  7/21/2024 1113 by Haydee Reeves RN  Outcome: Progressing  7/20/2024 2157 by Christy Flores, RN  Outcome: Progressing

## 2024-07-21 NOTE — PROGRESS NOTES
General Surgery  Daily Progress Note    Pt Name: Austen Barahona  Medical Record Number: [id number]  Date of Birth 1970   Today's Date: 7/21/2024    Chief Complaint   Patient presents with    GI Problem     Noticed bleeding out of colostomy around 630pm this evening. Has changed ostomy bag twice already. Pt states that when he changes the bag, it has dark red output.       ASSESSMENT/PLAN  Bleeding from ostomy site, portal hypertension secondary to cirrhosis, anemia - Hgb trending down with intermittent bleeding, currently 10.7.  CT abd/pelvis obtained and personally reviewed, showing new varices which are most likely contributing to his bleeding.  I do not think that any measure externally or surgically will prevent the ongoing bleeding.  Discussed with hospitalist team.  Recommend transfer to  for possible TIPS procedure for his portal hypertension.  This can hopefully be done before he needs a transfusion.  Hx of HIV, Hep B      SUBJECTIVE  Austen is unchanged from yesterday.   He is still having intermittent bleeding from his ostomy site.  He denies any pain. Current activity is ad leon    OBJECTIVE  VITALS:  height is 1.854 m (6' 1\") and weight is 105.1 kg (231 lb 11.3 oz). His oral temperature is 98.7 °F (37.1 °C). His blood pressure is 108/76 and his pulse is 92. His respiration is 18 and oxygen saturation is 97%.   GENERAL: alert, no distress  LUNGS: normal respiratory effort, no accessory muscle use  HEART: normal rate and regular rhythm  ABDOMEN: soft, NT, ND, ostomy appliance with some stool  EXTREMITY: no cyanosis and no clubbing  I/O last 3 completed shifts:  In: 1420 [P.O.:1420]  Out: -   No intake/output data recorded.    LABS  Recent Labs     07/20/24  0124 07/20/24  0125 07/21/24  0610   WBC 4.3  --   --    HGB 12.2*   < > 10.7*   HCT 37.1*   < > 31.8*   PLT 58*  --   --      --   --    K 4.1  --   --      --   --    CO2 21  --   --    BUN 15  --   --    CREATININE 0.9  --

## 2024-07-21 NOTE — PROGRESS NOTES
Patients colostomy pouch was about half way full of blood when emptied at 2010. Applied gel foam by patient's stoma to help with the bleeding per Harmeet Martinez MD. Updated Juan via phone that it stopped bleeding for the time. Patient's stoma did begin bleeding again right before being transported to CTAP with contrast. Pt returned to room from CT at 2137.

## 2024-07-21 NOTE — PROGRESS NOTES
Emptied pt colostomy at 0109. It was half full with stool and blood. Applied new gel foam and colostomy bag. Rechecked colostomy bag at 0700. Bag is empty, no blood or stool.  Pt resting in bed, call light within reach, denies any needs at this time.

## 2024-07-22 VITALS
BODY MASS INDEX: 30.33 KG/M2 | OXYGEN SATURATION: 96 % | HEIGHT: 73 IN | RESPIRATION RATE: 17 BRPM | WEIGHT: 228.84 LBS | SYSTOLIC BLOOD PRESSURE: 110 MMHG | DIASTOLIC BLOOD PRESSURE: 79 MMHG | HEART RATE: 95 BPM | TEMPERATURE: 98.8 F

## 2024-07-22 PROCEDURE — 6360000002 HC RX W HCPCS: Performed by: HOSPITALIST

## 2024-07-22 PROCEDURE — 2580000003 HC RX 258: Performed by: HOSPITALIST

## 2024-07-22 RX ADMIN — OCTREOTIDE ACETATE 25 MCG/HR: 500 INJECTION, SOLUTION INTRAVENOUS; SUBCUTANEOUS at 04:50

## 2024-07-22 NOTE — PLAN OF CARE
Problem: Discharge Planning  Goal: Discharge to home or other facility with appropriate resources  7/22/2024 0053 by Christy Flores RN  Outcome: Progressing  7/21/2024 1113 by Haydee Reeves RN  Outcome: Progressing     Problem: Pain  Goal: Verbalizes/displays adequate comfort level or baseline comfort level  7/22/2024 0053 by Christy Flores RN  Outcome: Progressing  7/21/2024 1113 by Haydee Reeves RN  Outcome: Progressing     Problem: Safety - Adult  Goal: Free from fall injury  7/22/2024 0053 by Christy Flores RN  Outcome: Progressing  7/21/2024 1113 by Haydee Reeves RN  Outcome: Progressing     Problem: Gastrointestinal - Adult  Goal: Minimal or absence of nausea and vomiting  7/22/2024 0053 by Christy Flores RN  Outcome: Progressing  7/21/2024 1113 by Haydee Reeves RN  Outcome: Progressing  Goal: Maintains or returns to baseline bowel function  7/22/2024 0053 by Christy Flores RN  Outcome: Progressing  7/21/2024 1113 by Haydee Reeves, RN  Outcome: Progressing  Goal: Maintains adequate nutritional intake  7/22/2024 0053 by Christy Flores RN  Outcome: Progressing  7/21/2024 1113 by Haydee Reeves RN  Outcome: Progressing  Goal: Establish and maintain optimal ostomy function  7/22/2024 0053 by Christy Flores, RN  Outcome: Progressing  7/21/2024 1113 by Haydee Reeves, RN  Outcome: Progressing

## 2024-07-22 NOTE — PROGRESS NOTES
Transport will pick the pt up at 0600 to transfer him to . [address], room 8158. Updated pt. Pt has all belongings packed and is dressed. IV in left hand with continuous octreotide. Colostomy emptied, medium stool, scant blood. VSS. Alert and oriented x4. Pt resting in bed, call light within reach. Pt updated his family. Denies any needs at this time.  Called report to RN at . No questions at this times.

## 2025-06-03 NOTE — PROGRESS NOTES
Coming in today for appt.   Hospitalist Progress Note      PCP: iNsha Cooper MD, MD    Date of Admission: 11/16/2022    Chief Complaint: bleeding from colostomy    Hospital Course:  69-year-old male with past medical history of HIV, hepatitis B cirrhosis, had a wide perineal resection and APR with diverting colostomy due to a HPV related squamous cell anal cancer, diabetes, chronic hepatitis B, HIV, fatty liver disease with cirrhosis followed by Dr. Karen Pardo at [facility]. His HIV is well controlled on antiretroviral therapy. on tenofovir for his hepatitis B. He has followed with infectious disease and has had an undetectable viral load and a good CD4 count. The patient has had a diverting colostomy since 2001. He has been followed by Dr. Reji Dee. Hypertension. presents to the hospital for reported seeing pulsatile maroon blood from colostomy. According to patient he has been bleeding for past 48 hours. He mentions he feels dizzy lightheaded he mentions his bleeding has been slowing down in the past 48 hours. Denied fevers chills diarrhea. Seen by GI had EGD w bx and ileoscopy 11/17/22. There was some ulceration at os stoma. EGD w bx and ileoscopy 11/17/22  Postoperative Diagnosis:   1) The esophagus was normal without evidence of esophagitis, Sainz's esophagus, strictures, rings, furrowing, masses, or nodules. No varices were noted. GE junction was at 41 cm from the incisors. 2) No gastric varices were noted. There was moderate portal gastropathy noted. 3) Mild erythema of the antrum. Biopsies were obtained from the antrum and body of the stomach to rule out active gastritis and H.pylori gastritis. 4) There were pre-pyloric ulcerations noted. No bleeding stigmata were noted. 5) THere were several clean based ulcers noted in the duodenal bulb and duodenal sweep. 6) The second portion of the duodenum had bile noted. No signs of bleeding were noted.   7) There was a reactive appearing nodule with ulceration noted at the os of the ileostomy. There was no active bleeding. 8) Ileal mucosa was normal.  Brown stool was noted    Findings:     1) The esophagus was normal without evidence of esophagitis, Sainz's esophagus, strictures, rings, furrowing, masses, or nodules. No varices were noted. GE junction was at 41 cm from the incisors. 2) No gastric varices were noted. There was moderate portal gastropathy noted. 3) Mild erythema of the antrum. Biopsies were obtained from the antrum and body of the stomach to rule out active gastritis and H.pylori gastritis. 4) There were pre-pyloric ulcerations noted. No bleeding stigmata were noted. 5) THere were several clean based ulcers noted in the duodenal bulb and duodenal sweep. 6) The second portion of the duodenum had bile noted. No signs of bleeding were noted. The scope was then withdrawn back into the stomach, it was decompressed, and the scope was completely withdrawn. Then, an upper endoscope was introduced into the ileoscopy site. There was a reactive appearing nodule with ulceration noted at the os of the ileostomy. There was no active bleeding. Ileal mucosa was normal.  Brown stool was noted. No further bleeding downgraded from ICU to floor 11/18/22    Procedures:  DATE OF PROCEDURE:  11/16/2022  PREOPERATIVE DIAGNOSES:  Difficult Reece, gross hematuria. POSTOPERATIVE DIAGNOSES:  Ureteral stricture, gross hematuria, difficult Reece. OPERATION PERFORMED:  Cystoscopy with urethral dilation. SURGEON:  Mahad Christopher. Claudia Davidson MD   ANESTHESIA:  Local.   FINDINGS:  Dense penile bulbar urethral stricture, which was dilated  from 12-Greek up to 18-Greek and then a 16-Greek Telida-tip catheter was placed.     Subjective: more sleepy and lethargic today having to try to push to get urine out with retention O2 3L NC mild wheezing per nephrology-had urinary retention due to urethral stricture which was dilated cystoscopically by urology upon presentation to the ED due to difficulty placing a harris. Last dose pain med 0730all other systems neg       Medications:  Reviewed    Infusion Medications    sodium chloride      sodium chloride 25 mL/hr at 11/18/22 1818    dextrose       Scheduled Medications    nicotine  1 patch TransDERmal Daily    raltegravir  400 mg Oral BID    emtricitabine  200 mg Oral Daily    And    Tenofovir Alafenamide Fumarate  25 mg Oral Daily    mometasone-formoterol  2 puff Inhalation BID    azithromycin  250 mg IntraVENous Q24H    sodium chloride flush  10 mL IntraVENous 2 times per day    pantoprazole  40 mg IntraVENous Daily    cefTRIAXone (ROCEPHIN) IV  2,000 mg IntraVENous Q24H    clonazePAM  2 mg Oral BID    QUEtiapine  800 mg Oral Nightly    insulin lispro  0-8 Units SubCUTAneous TID WC    insulin lispro  0-4 Units SubCUTAneous Nightly    albuterol-ipratropium  1 puff Inhalation TID     PRN Meds: oxyCODONE, sodium chloride, sodium chloride flush, sodium chloride, promethazine **OR** ondansetron, polyethylene glycol, acetaminophen **OR** acetaminophen, albuterol, albuterol sulfate HFA, glucose, dextrose bolus **OR** dextrose bolus, glucagon (rDNA), dextrose      Intake/Output Summary (Last 24 hours) at 11/20/2022 0803  Last data filed at 11/20/2022 0616  Gross per 24 hour   Intake 2080 ml   Output 1400 ml   Net 680 ml       Physical Exam Performed:    /85   Pulse 83   Temp 97.4 °F (36.3 °C) (Axillary)   Resp 16   Ht 6' 1\" (1.854 m)   Wt 262 lb 2 oz (118.9 kg)   SpO2 99%   BMI 34.58 kg/m²   Gen: No distress. obese sleepy dozing off but responded verbally to questions   HEENT  mucosa moist goatee  EYES: PERRL EOMI anicteric  Resp: O2 3L NC, mild wheezing throughout no dyspnea  CV: RRR No murmur or rub.   Neck left IJ TLC CDI supple no JVD   Extremity No edema   Skin no jaundice no pallor    Abdomen obese NT ND soft  ostomy LLQ  M/S: alert Oriented x 3 sleepier today    voids        Labs:   Recent Labs     11/17/22  2305 11/18/22  9475 11/19/22  0805   WBC  --  5.9 5.9   HGB 8.7* 8.9* 8.6*   HCT 25.8* 26.4* 25.6*   PLT  --  66* 75*     Recent Labs     11/18/22  0445 11/19/22  0450   * 132*   K 3.6 3.5   CL 95* 99   CO2 23 21   BUN 22* 19   CREATININE 1.1 0.9   CALCIUM 7.4* 7.8*     Recent Labs     11/18/22  0445 11/19/22  0450   AST 1,175* 803*   * 830*   BILIDIR 0.5*  --    BILITOT 0.7 0.7   ALKPHOS 67 69     No results for input(s): INR in the last 72 hours. No results for input(s): Donzella Rands in the last 72 hours. Urinalysis:    No results found for: Davy Fabry, BACTERIA, RBCUA, BLOODU, [address], [place] [address]    Radiology:  CT CHEST WO CONTRAST   Final Result   1. Patchy confluent airspace opacities in the left upper lobe which likely   reflect pneumonia. 2. Bullous emphysema. 3. Left internal jugular venous catheter extending to the central aspect of   the left brachiocephalic vein and right subclavian Port-A-Cath with a loop at   the central aspect of the subclavian vein. 4. Small pericardial effusion. 5. Hepatic cirrhosis and at least mild splenomegaly. XR CHEST PORTABLE   Final Result   Bilateral central bronchial thickening with left perihilar pneumonia. Left IJ central venous catheter terminates in the central left   brachiocephalic vein. A right-sided chest port is identified, with the mid aspect of the catheter   seen coiled overlying the region of the central right subclavian   vein/innominate vein. US ABDOMEN COMPLETE   Final Result   Marked right hydronephrosis and marked right renal parenchymal atrophy.       RECOMMENDATIONS:   Unavailable             IP CONSULT TO GENERAL SURGERY  IP CONSULT TO GENERAL SURGERY  IP CONSULT TO UROLOGY  IP CONSULT TO GI  IP CONSULT TO NEPHROLOGY  IP CONSULT TO CRITICAL CARE  IP CONSULT TO CRITICAL CARE    Assessment/Plan:    Active Hospital Problems    Diagnosis     Bleeding from colostomy stoma ([facility] [address] [K94.01]      Priority: Medium    Rectal

## (undated) DEVICE — MERCY HEALTH WEST TURNOVER: Brand: MEDLINE INDUSTRIES, INC.

## (undated) DEVICE — SUTURE ABSORBABLE BRAIDED 3-0 12X18 IN COAT UD VICRYL + VCP110G

## (undated) DEVICE — FORCEPS BX 240CM 2.4MM L NDL RAD JAW 4 M00513334

## (undated) DEVICE — SHEET, T, LAPAROTOMY, STERILE: Brand: MEDLINE

## (undated) DEVICE — BITE BLOCK ENDOSCP AD 60 FR W/ ADJ STRP PLAS GRN BLOX

## (undated) DEVICE — FORMALIN CLEAR VIAL 20 ML 10%

## (undated) DEVICE — MAJOR SET UP: Brand: MEDLINE INDUSTRIES, INC.

## (undated) DEVICE — SUTURE VCRL + SZ 2-0 L18IN ABSRB VLT L26MM SH 1/2 CIR VCP775D

## (undated) DEVICE — SUTURE VCRL + SZ 3-0 L27IN ABSRB UD L26MM SH 1/2 CIR VCP416H

## (undated) DEVICE — GLOVE ORANGE PI 7   MSG9070

## (undated) DEVICE — CLEANER,CAUTERY TIP,2X2",STERILE: Brand: MEDLINE

## (undated) DEVICE — TOWEL,OR,DSP,ST,BLUE,STD,4/PK,20PK/CS: Brand: MEDLINE

## (undated) DEVICE — ENDOSCOPY KIT: Brand: MEDLINE INDUSTRIES, INC.

## (undated) DEVICE — SUTURE VCRL + SZ 2-0 L27IN ABSRB CLR CT-1 1/2 CIR TAPERCUT VCP259H

## (undated) DEVICE — ADHESIVE DERMABOND TOPICAL SKIN MINI .36ML

## (undated) DEVICE — SUTURE VCRL + SZ 3-0 L18IN CT 1 CR ABSRB VCP838D

## (undated) DEVICE — SPONGE LAP W18XL18IN WHT COT 4 PLY FLD STRUNG RADPQ DISP ST 2 PER PACK

## (undated) DEVICE — SPONGE GZ W4XL4IN COT 12 PLY TYP VII WVN C FLD DSGN STERILE

## (undated) DEVICE — SOLUTION IRRIG 1000ML 0.9% SOD CHL USP POUR PLAS BTL